# Patient Record
Sex: FEMALE | Race: WHITE | NOT HISPANIC OR LATINO | Employment: OTHER | URBAN - METROPOLITAN AREA
[De-identification: names, ages, dates, MRNs, and addresses within clinical notes are randomized per-mention and may not be internally consistent; named-entity substitution may affect disease eponyms.]

---

## 2017-01-11 ENCOUNTER — TRANSCRIBE ORDERS (OUTPATIENT)
Dept: ADMINISTRATIVE | Facility: HOSPITAL | Age: 62
End: 2017-01-11

## 2017-01-11 DIAGNOSIS — Z12.31 VISIT FOR SCREENING MAMMOGRAM: Primary | ICD-10-CM

## 2017-01-19 ENCOUNTER — HOSPITAL ENCOUNTER (OUTPATIENT)
Dept: MAMMOGRAPHY | Facility: HOSPITAL | Age: 62
Discharge: HOME | End: 2017-01-19
Attending: OBSTETRICS & GYNECOLOGY | Admitting: OBSTETRICS & GYNECOLOGY

## 2017-01-19 DIAGNOSIS — Z12.31 VISIT FOR SCREENING MAMMOGRAM: ICD-10-CM

## 2017-01-19 PROCEDURE — 77063 BREAST TOMOSYNTHESIS BI: CPT

## 2017-01-19 PROCEDURE — G0202 SCR MAMMO BI INCL CAD: HCPCS

## 2017-01-19 PROCEDURE — 77063 BREAST TOMOSYNTHESIS BI: CPT | Performed by: RADIOLOGY

## 2017-01-19 PROCEDURE — G0202 SCR MAMMO BI INCL CAD: HCPCS | Performed by: RADIOLOGY

## 2018-01-30 ENCOUNTER — TRANSCRIBE ORDERS (OUTPATIENT)
Dept: ADMINISTRATIVE | Facility: HOSPITAL | Age: 63
End: 2018-01-30

## 2018-01-30 DIAGNOSIS — Z12.31 VISIT FOR SCREENING MAMMOGRAM: Primary | ICD-10-CM

## 2018-02-15 ENCOUNTER — APPOINTMENT (OUTPATIENT)
Dept: MAMMOGRAPHY | Facility: HOSPITAL | Age: 63
End: 2018-02-15
Attending: OBSTETRICS & GYNECOLOGY

## 2018-02-16 ENCOUNTER — HOSPITAL ENCOUNTER (OUTPATIENT)
Dept: MAMMOGRAPHY | Facility: HOSPITAL | Age: 63
Discharge: HOME | End: 2018-02-16
Attending: OBSTETRICS & GYNECOLOGY | Admitting: OBSTETRICS & GYNECOLOGY

## 2018-02-16 DIAGNOSIS — Z12.31 VISIT FOR SCREENING MAMMOGRAM: ICD-10-CM

## 2018-02-16 PROCEDURE — 77063 BREAST TOMOSYNTHESIS BI: CPT

## 2018-02-16 PROCEDURE — 77063 BREAST TOMOSYNTHESIS BI: CPT | Performed by: RADIOLOGY

## 2018-02-16 PROCEDURE — 77067 SCR MAMMO BI INCL CAD: CPT | Performed by: RADIOLOGY

## 2018-02-16 PROCEDURE — 77067 SCR MAMMO BI INCL CAD: CPT

## 2018-03-02 ENCOUNTER — LAB REQUISITION (OUTPATIENT)
Dept: LAB | Facility: HOSPITAL | Age: 63
End: 2018-03-02

## 2018-03-02 DIAGNOSIS — Z12.11 ENCOUNTER FOR SCREENING FOR MALIGNANT NEOPLASM OF COLON: ICD-10-CM

## 2018-03-02 DIAGNOSIS — D12.0 BENIGN NEOPLASM OF CECUM: ICD-10-CM

## 2018-03-02 PROCEDURE — 88305 TISSUE EXAM BY PATHOLOGIST: CPT | Performed by: INTERNAL MEDICINE

## 2018-03-05 LAB
CYTO UR: NORMAL
LAB AP CASE REPORT: NORMAL
LAB AP CLINICAL INFORMATION: NORMAL
Lab: NORMAL
PATH REPORT.FINAL DX SPEC: NORMAL
PATH REPORT.GROSS SPEC: NORMAL

## 2019-02-20 ENCOUNTER — TRANSCRIBE ORDERS (OUTPATIENT)
Dept: ADMINISTRATIVE | Facility: HOSPITAL | Age: 64
End: 2019-02-20

## 2019-02-20 DIAGNOSIS — Z12.31 VISIT FOR SCREENING MAMMOGRAM: Primary | ICD-10-CM

## 2019-02-26 ENCOUNTER — HOSPITAL ENCOUNTER (OUTPATIENT)
Dept: MAMMOGRAPHY | Facility: HOSPITAL | Age: 64
Discharge: HOME | End: 2019-02-26
Admitting: FAMILY MEDICINE

## 2019-02-26 DIAGNOSIS — Z12.31 VISIT FOR SCREENING MAMMOGRAM: ICD-10-CM

## 2019-02-26 PROCEDURE — 77067 SCR MAMMO BI INCL CAD: CPT

## 2019-02-26 PROCEDURE — 77067 SCR MAMMO BI INCL CAD: CPT | Performed by: RADIOLOGY

## 2019-02-26 PROCEDURE — 77063 BREAST TOMOSYNTHESIS BI: CPT

## 2019-02-26 PROCEDURE — 77063 BREAST TOMOSYNTHESIS BI: CPT | Performed by: RADIOLOGY

## 2019-03-01 ENCOUNTER — HOSPITAL ENCOUNTER (OUTPATIENT)
Dept: MAMMOGRAPHY | Facility: HOSPITAL | Age: 64
Discharge: HOME | End: 2019-03-01
Admitting: RADIOLOGY

## 2019-03-01 DIAGNOSIS — R92.8 ABNORMAL MAMMOGRAM: ICD-10-CM

## 2019-03-01 PROCEDURE — 77065 DX MAMMO INCL CAD UNI: CPT | Performed by: RADIOLOGY

## 2019-03-01 PROCEDURE — 77065 DX MAMMO INCL CAD UNI: CPT

## 2019-03-01 PROCEDURE — G0279 TOMOSYNTHESIS, MAMMO: HCPCS | Performed by: RADIOLOGY

## 2019-03-01 PROCEDURE — G0279 TOMOSYNTHESIS, MAMMO: HCPCS

## 2020-07-01 ENCOUNTER — TRANSCRIBE ORDERS (OUTPATIENT)
Dept: ADMINISTRATIVE | Facility: HOSPITAL | Age: 65
End: 2020-07-01

## 2020-07-01 DIAGNOSIS — Z12.31 VISIT FOR SCREENING MAMMOGRAM: Primary | ICD-10-CM

## 2020-09-24 ENCOUNTER — HOSPITAL ENCOUNTER (OUTPATIENT)
Dept: MAMMOGRAPHY | Facility: HOSPITAL | Age: 65
Discharge: HOME | End: 2020-09-24
Admitting: FAMILY MEDICINE

## 2020-09-24 DIAGNOSIS — Z12.31 VISIT FOR SCREENING MAMMOGRAM: ICD-10-CM

## 2020-09-24 PROCEDURE — 77067 SCR MAMMO BI INCL CAD: CPT | Performed by: RADIOLOGY

## 2020-09-24 PROCEDURE — 77063 BREAST TOMOSYNTHESIS BI: CPT

## 2020-09-24 PROCEDURE — 77063 BREAST TOMOSYNTHESIS BI: CPT | Performed by: RADIOLOGY

## 2020-09-24 PROCEDURE — 77067 SCR MAMMO BI INCL CAD: CPT

## 2021-08-05 ENCOUNTER — TRANSCRIBE ORDERS (OUTPATIENT)
Dept: ADMINISTRATIVE | Facility: HOSPITAL | Age: 66
End: 2021-08-05

## 2021-08-05 DIAGNOSIS — M85.80 OTHER SPECIFIED DISORDERS OF BONE DENSITY AND STRUCTURE, UNSPECIFIED SITE: Primary | ICD-10-CM

## 2021-09-10 ENCOUNTER — APPOINTMENT (OUTPATIENT)
Dept: PREADMISSION TESTING | Facility: HOSPITAL | Age: 66
End: 2021-09-10

## 2021-11-15 ENCOUNTER — TRANSCRIBE ORDERS (OUTPATIENT)
Dept: ADMINISTRATIVE | Facility: HOSPITAL | Age: 66
End: 2021-11-15

## 2021-11-15 DIAGNOSIS — Z12.31 VISIT FOR SCREENING MAMMOGRAM: Primary | ICD-10-CM

## 2021-12-01 ENCOUNTER — TRANSCRIBE ORDERS (OUTPATIENT)
Dept: ADMINISTRATIVE | Facility: HOSPITAL | Age: 66
End: 2021-12-01

## 2021-12-01 DIAGNOSIS — N95.8 OTHER SPECIFIED MENOPAUSAL AND PERIMENOPAUSAL DISORDERS: Primary | ICD-10-CM

## 2021-12-27 PROBLEM — I48.92 PAROXYSMAL ATRIAL FLUTTER: Status: ACTIVE | Noted: 2021-12-27

## 2021-12-27 PROBLEM — Z99.89 OSA ON CPAP: Status: ACTIVE | Noted: 2021-12-27

## 2021-12-27 PROBLEM — E78.5 HYPERLIPIDEMIA LDL GOAL <100: Status: ACTIVE | Noted: 2021-12-27

## 2021-12-27 PROBLEM — E11.9 TYPE 2 DIABETES MELLITUS WITHOUT COMPLICATION, WITHOUT LONG-TERM CURRENT USE OF INSULIN: Status: ACTIVE | Noted: 2021-12-27

## 2021-12-27 PROBLEM — G47.33 OSA ON CPAP: Status: ACTIVE | Noted: 2021-12-27

## 2021-12-27 PROBLEM — I10 ESSENTIAL HYPERTENSION: Status: ACTIVE | Noted: 2021-12-27

## 2022-01-07 ENCOUNTER — APPOINTMENT (OUTPATIENT)
Dept: MAMMOGRAPHY | Facility: HOSPITAL | Age: 67
End: 2022-01-07

## 2022-02-08 ENCOUNTER — HOSPITAL ENCOUNTER (OUTPATIENT)
Dept: MAMMOGRAPHY | Facility: HOSPITAL | Age: 67
Discharge: HOME | End: 2022-02-08
Admitting: FAMILY MEDICINE

## 2022-02-08 DIAGNOSIS — Z12.31 VISIT FOR SCREENING MAMMOGRAM: ICD-10-CM

## 2022-02-08 PROCEDURE — 77063 BREAST TOMOSYNTHESIS BI: CPT

## 2022-02-08 PROCEDURE — 77067 SCR MAMMO BI INCL CAD: CPT | Performed by: RADIOLOGY

## 2022-02-08 PROCEDURE — 77067 SCR MAMMO BI INCL CAD: CPT

## 2022-02-08 PROCEDURE — 77063 BREAST TOMOSYNTHESIS BI: CPT | Performed by: RADIOLOGY

## 2022-03-03 ENCOUNTER — OFFICE VISIT (OUTPATIENT)
Dept: CARDIOLOGY | Facility: CLINIC | Age: 67
End: 2022-03-03

## 2022-03-03 VITALS
SYSTOLIC BLOOD PRESSURE: 110 MMHG | OXYGEN SATURATION: 95 % | HEIGHT: 60 IN | DIASTOLIC BLOOD PRESSURE: 84 MMHG | WEIGHT: 186 LBS | HEART RATE: 80 BPM | BODY MASS INDEX: 36.52 KG/M2

## 2022-03-03 DIAGNOSIS — G47.33 OSA ON CPAP: ICD-10-CM

## 2022-03-03 DIAGNOSIS — R94.31 ABNORMAL ELECTROCARDIOGRAM (ECG) (EKG): ICD-10-CM

## 2022-03-03 DIAGNOSIS — E78.5 HYPERLIPIDEMIA LDL GOAL <100: ICD-10-CM

## 2022-03-03 DIAGNOSIS — Z99.89 OSA ON CPAP: ICD-10-CM

## 2022-03-03 DIAGNOSIS — E11.9 TYPE 2 DIABETES MELLITUS WITHOUT COMPLICATION, WITHOUT LONG-TERM CURRENT USE OF INSULIN: ICD-10-CM

## 2022-03-03 DIAGNOSIS — I10 ESSENTIAL HYPERTENSION: ICD-10-CM

## 2022-03-03 DIAGNOSIS — R00.2 PALPITATIONS: Primary | ICD-10-CM

## 2022-03-03 PROCEDURE — 93000 ELECTROCARDIOGRAM COMPLETE: CPT | Performed by: INTERNAL MEDICINE

## 2022-03-03 PROCEDURE — 99204 OFFICE O/P NEW MOD 45 MIN: CPT | Performed by: INTERNAL MEDICINE

## 2022-03-03 RX ORDER — FUROSEMIDE 40 MG/1
40 TABLET ORAL DAILY
COMMUNITY

## 2022-03-03 RX ORDER — LEVOTHYROXINE SODIUM 88 UG/1
88 TABLET ORAL DAILY
COMMUNITY

## 2022-03-03 RX ORDER — METOPROLOL TARTRATE 50 MG/1
25 TABLET, FILM COATED ORAL 2 TIMES DAILY
COMMUNITY
End: 2022-08-22 | Stop reason: SDUPTHER

## 2022-03-03 RX ORDER — SPIRONOLACTONE 25 MG/1
25 TABLET ORAL DAILY
COMMUNITY

## 2022-03-03 RX ORDER — AZELASTINE 1 MG/ML
2 SPRAY, METERED NASAL 2 TIMES DAILY
COMMUNITY

## 2022-03-03 RX ORDER — MELOXICAM 15 MG/1
15 TABLET ORAL DAILY
COMMUNITY
Start: 2022-02-08

## 2022-03-03 RX ORDER — ERGOCALCIFEROL (VITAMIN D2) 10 MCG
TABLET ORAL DAILY
COMMUNITY
End: 2022-08-22

## 2022-03-03 RX ORDER — CYCLOBENZAPRINE HCL 5 MG
5 TABLET ORAL
COMMUNITY
Start: 2022-02-09 | End: 2022-08-22

## 2022-03-03 RX ORDER — ASCORBIC ACID 500 MG
500 TABLET ORAL
COMMUNITY
End: 2022-08-22

## 2022-03-03 RX ORDER — ALBUTEROL SULFATE 90 UG/1
2 AEROSOL, METERED RESPIRATORY (INHALATION) EVERY 4 HOURS PRN
COMMUNITY

## 2022-03-03 RX ORDER — ATORVASTATIN CALCIUM 20 MG/1
20 TABLET, FILM COATED ORAL DAILY
COMMUNITY
Start: 2022-02-08

## 2022-03-03 RX ORDER — EPINEPHRINE 0.3 MG/.3ML
INJECTION SUBCUTANEOUS ONCE
COMMUNITY

## 2022-03-10 ENCOUNTER — HOSPITAL ENCOUNTER (OUTPATIENT)
Dept: BONE DENSITY | Facility: HOSPITAL | Age: 67
Discharge: HOME | End: 2022-03-10
Admitting: FAMILY MEDICINE

## 2022-03-10 DIAGNOSIS — N95.8 OTHER SPECIFIED MENOPAUSAL AND PERIMENOPAUSAL DISORDERS: ICD-10-CM

## 2022-03-10 PROCEDURE — 77080 DXA BONE DENSITY AXIAL: CPT

## 2022-03-17 ENCOUNTER — HOSPITAL ENCOUNTER (OUTPATIENT)
Dept: CARDIOLOGY | Facility: HOSPITAL | Age: 67
Discharge: HOME | End: 2022-03-17
Admitting: INTERNAL MEDICINE

## 2022-03-17 VITALS — HEIGHT: 60 IN | WEIGHT: 186 LBS | BODY MASS INDEX: 36.52 KG/M2

## 2022-03-17 DIAGNOSIS — R00.2 PALPITATIONS: ICD-10-CM

## 2022-03-17 DIAGNOSIS — R94.31 ABNORMAL ELECTROCARDIOGRAM (ECG) (EKG): ICD-10-CM

## 2022-03-17 LAB
ASCENDING AORTA: 3.5 CM
BH CV ECHO MEAS - AO MAX PG (FULL): 2.3 MMHG
BH CV ECHO MEAS - AO MAX PG: 3.9 MMHG
BH CV ECHO MEAS - AO MEAN PG (FULL): 1.3 MMHG
BH CV ECHO MEAS - AO MEAN PG: 2.2 MMHG
BH CV ECHO MEAS - AO ROOT AREA (BSA CORRECTED): 1.6
BH CV ECHO MEAS - AO ROOT AREA: 6.8 CM^2
BH CV ECHO MEAS - AO ROOT DIAM: 2.9 CM
BH CV ECHO MEAS - AO V2 MAX: 99.1 CM/SEC
BH CV ECHO MEAS - AO V2 MEAN: 70.7 CM/SEC
BH CV ECHO MEAS - AO V2 VTI: 19.9 CM
BH CV ECHO MEAS - AVA(I,A): 2.2 CM^2
BH CV ECHO MEAS - AVA(I,D): 2.2 CM^2
BH CV ECHO MEAS - AVA(V,A): 2 CM^2
BH CV ECHO MEAS - AVA(V,D): 2 CM^2
BH CV ECHO MEAS - BSA(HAYCOCK): 1.9 M^2
BH CV ECHO MEAS - BSA: 1.8 M^2
BH CV ECHO MEAS - BZI_BMI: 36.3 KILOGRAMS/M^2
BH CV ECHO MEAS - BZI_METRIC_HEIGHT: 152.4 CM
BH CV ECHO MEAS - BZI_METRIC_WEIGHT: 84.4 KG
BH CV ECHO MEAS - EDV(CUBED): 53.5 ML
BH CV ECHO MEAS - EDV(MOD-SP2): 78 ML
BH CV ECHO MEAS - EDV(MOD-SP4): 87 ML
BH CV ECHO MEAS - EDV(TEICH): 60.7 ML
BH CV ECHO MEAS - EF(CUBED): 66.1 %
BH CV ECHO MEAS - EF(MOD-BP): 59 %
BH CV ECHO MEAS - EF(MOD-SP2): 65.4 %
BH CV ECHO MEAS - EF(MOD-SP4): 49.4 %
BH CV ECHO MEAS - EF(TEICH): 58.4 %
BH CV ECHO MEAS - ESV(CUBED): 18.1 ML
BH CV ECHO MEAS - ESV(MOD-SP2): 27 ML
BH CV ECHO MEAS - ESV(MOD-SP4): 44 ML
BH CV ECHO MEAS - ESV(TEICH): 25.2 ML
BH CV ECHO MEAS - FS: 30.3 %
BH CV ECHO MEAS - IVS/LVPW: 0.96
BH CV ECHO MEAS - IVSD: 1.1 CM
BH CV ECHO MEAS - LA DIMENSION: 2.9 CM
BH CV ECHO MEAS - LA/AO: 0.98
BH CV ECHO MEAS - LAD MAJOR: 4.7 CM
BH CV ECHO MEAS - LAT PEAK E' VEL: 3.8 CM/SEC
BH CV ECHO MEAS - LATERAL E/E' RATIO: 15.6
BH CV ECHO MEAS - LV DIASTOLIC VOL/BSA (35-75): 48.1 ML/M^2
BH CV ECHO MEAS - LV MASS(C)D: 130.5 GRAMS
BH CV ECHO MEAS - LV MASS(C)DI: 72.1 GRAMS/M^2
BH CV ECHO MEAS - LV MAX PG: 1.6 MMHG
BH CV ECHO MEAS - LV MEAN PG: 0.92 MMHG
BH CV ECHO MEAS - LV SYSTOLIC VOL/BSA (12-30): 24.3 ML/M^2
BH CV ECHO MEAS - LV V1 MAX: 63.2 CM/SEC
BH CV ECHO MEAS - LV V1 MEAN: 44 CM/SEC
BH CV ECHO MEAS - LV V1 VTI: 13.5 CM
BH CV ECHO MEAS - LVIDD: 3.8 CM
BH CV ECHO MEAS - LVIDS: 2.6 CM
BH CV ECHO MEAS - LVLD AP2: 6.9 CM
BH CV ECHO MEAS - LVLD AP4: 7.6 CM
BH CV ECHO MEAS - LVLS AP2: 6.3 CM
BH CV ECHO MEAS - LVLS AP4: 6.4 CM
BH CV ECHO MEAS - LVOT AREA (M): 3.1 CM^2
BH CV ECHO MEAS - LVOT AREA: 3.2 CM^2
BH CV ECHO MEAS - LVOT DIAM: 2 CM
BH CV ECHO MEAS - LVPWD: 1.1 CM
BH CV ECHO MEAS - MED PEAK E' VEL: 3.8 CM/SEC
BH CV ECHO MEAS - MEDIAL E/E' RATIO: 15.6
BH CV ECHO MEAS - MV A MAX VEL: 93.8 CM/SEC
BH CV ECHO MEAS - MV DEC SLOPE: 231.3 CM/SEC^2
BH CV ECHO MEAS - MV DEC TIME: 0.22 SEC
BH CV ECHO MEAS - MV E MAX VEL: 62.2 CM/SEC
BH CV ECHO MEAS - MV E/A: 0.66
BH CV ECHO MEAS - MV MAX PG: 3.6 MMHG
BH CV ECHO MEAS - MV MEAN PG: 1.5 MMHG
BH CV ECHO MEAS - MV P1/2T MAX VEL: 88.2 CM/SEC
BH CV ECHO MEAS - MV P1/2T: 111.7 MSEC
BH CV ECHO MEAS - MV V2 MAX: 95 CM/SEC
BH CV ECHO MEAS - MV V2 MEAN: 56.4 CM/SEC
BH CV ECHO MEAS - MV V2 VTI: 24.6 CM
BH CV ECHO MEAS - MVA P1/2T LCG: 2.5 CM^2
BH CV ECHO MEAS - MVA(P1/2T): 2 CM^2
BH CV ECHO MEAS - MVA(VTI): 1.7 CM^2
BH CV ECHO MEAS - PA ACC SLOPE: 377.4 CM/SEC^2
BH CV ECHO MEAS - PA ACC TIME: 0.14 SEC
BH CV ECHO MEAS - PA PR(ACCEL): 17.2 MMHG
BH CV ECHO MEAS - RAP SYSTOLE: 3 MMHG
BH CV ECHO MEAS - RVSP: 20 MMHG
BH CV ECHO MEAS - SI(AO): 74.7 ML/M^2
BH CV ECHO MEAS - SI(CUBED): 19.5 ML/M^2
BH CV ECHO MEAS - SI(LVOT): 23.7 ML/M^2
BH CV ECHO MEAS - SI(MOD-SP2): 28.2 ML/M^2
BH CV ECHO MEAS - SI(MOD-SP4): 23.8 ML/M^2
BH CV ECHO MEAS - SI(TEICH): 19.6 ML/M^2
BH CV ECHO MEAS - SV(AO): 135.3 ML
BH CV ECHO MEAS - SV(CUBED): 35.3 ML
BH CV ECHO MEAS - SV(LVOT): 43 ML
BH CV ECHO MEAS - SV(MOD-SP2): 51 ML
BH CV ECHO MEAS - SV(MOD-SP4): 43 ML
BH CV ECHO MEAS - SV(TEICH): 35.4 ML
BH CV ECHO MEAS - TAPSE (>1.6): 1.5 CM
BH CV ECHO MEAS - TR MAX PG: 17 MMHG
BH CV ECHO MEAS - TR MAX VEL: 204.5 CM/SEC
BH CV ECHO MEASUREMENTS AVERAGE E/E' RATIO: 16.37
BH CV VAS BP RIGHT ARM: NORMAL MMHG
BH CV XLRA - RV BASE: 3.3 CM
BH CV XLRA - RV LENGTH: 5.6 CM
BH CV XLRA - RV MID: 2.4 CM
BH CV XLRA - TDI S': 8.1 CM/SEC
LEFT ATRIUM VOLUME INDEX: 18.8 ML/M^2
LEFT ATRIUM VOLUME: 34 ML
LV EF 2D ECHO EST: 56 %

## 2022-03-17 PROCEDURE — 93306 TTE W/DOPPLER COMPLETE: CPT

## 2022-03-17 PROCEDURE — 93306 TTE W/DOPPLER COMPLETE: CPT | Performed by: INTERNAL MEDICINE

## 2022-04-26 ENCOUNTER — HOSPITAL ENCOUNTER (OUTPATIENT)
Dept: GENERAL RADIOLOGY | Facility: HOSPITAL | Age: 67
Discharge: HOME | End: 2022-04-26
Admitting: NURSE PRACTITIONER

## 2022-04-26 ENCOUNTER — TRANSCRIBE ORDERS (OUTPATIENT)
Dept: ADMINISTRATIVE | Facility: HOSPITAL | Age: 67
End: 2022-04-26

## 2022-04-26 DIAGNOSIS — M79.671 BILATERAL FOOT PAIN: Primary | ICD-10-CM

## 2022-04-26 DIAGNOSIS — M79.672 BILATERAL FOOT PAIN: Primary | ICD-10-CM

## 2022-04-26 PROCEDURE — 73630 X-RAY EXAM OF FOOT: CPT

## 2022-06-09 ENCOUNTER — TRANSCRIBE ORDERS (OUTPATIENT)
Dept: ADMINISTRATIVE | Facility: HOSPITAL | Age: 67
End: 2022-06-09

## 2022-06-09 DIAGNOSIS — M19.012 ARTHROSIS OF SHOULDER, LEFT: Primary | ICD-10-CM

## 2022-06-29 ENCOUNTER — TRANSCRIBE ORDERS (OUTPATIENT)
Dept: ADMINISTRATIVE | Facility: HOSPITAL | Age: 67
End: 2022-06-29

## 2022-06-29 ENCOUNTER — HOSPITAL ENCOUNTER (OUTPATIENT)
Dept: GENERAL RADIOLOGY | Facility: HOSPITAL | Age: 67
Discharge: HOME | End: 2022-06-29
Admitting: FAMILY MEDICINE

## 2022-06-29 DIAGNOSIS — S83.92XA SPRAIN OF LEFT KNEE, INITIAL ENCOUNTER: Primary | ICD-10-CM

## 2022-06-29 PROCEDURE — 73562 X-RAY EXAM OF KNEE 3: CPT

## 2022-06-29 PROCEDURE — 73590 X-RAY EXAM OF LOWER LEG: CPT

## 2022-07-06 ENCOUNTER — PRE-ADMISSION TESTING (OUTPATIENT)
Dept: PREADMISSION TESTING | Facility: HOSPITAL | Age: 67
End: 2022-07-06

## 2022-07-06 ENCOUNTER — HOSPITAL ENCOUNTER (OUTPATIENT)
Dept: CT IMAGING | Facility: HOSPITAL | Age: 67
Discharge: HOME | End: 2022-07-06

## 2022-07-06 ENCOUNTER — HOSPITAL ENCOUNTER (OUTPATIENT)
Dept: GENERAL RADIOLOGY | Facility: HOSPITAL | Age: 67
Discharge: HOME | End: 2022-07-06

## 2022-07-06 VITALS — BODY MASS INDEX: 34.9 KG/M2 | HEIGHT: 61 IN | WEIGHT: 184.86 LBS

## 2022-07-06 DIAGNOSIS — M19.012 ARTHROSIS OF SHOULDER, LEFT: ICD-10-CM

## 2022-07-06 LAB
ANION GAP SERPL CALCULATED.3IONS-SCNC: 9 MMOL/L (ref 5–15)
BUN SERPL-MCNC: 14 MG/DL (ref 8–23)
BUN/CREAT SERPL: 17.9 (ref 7–25)
CALCIUM SPEC-SCNC: 9.2 MG/DL (ref 8.6–10.5)
CHLORIDE SERPL-SCNC: 103 MMOL/L (ref 98–107)
CO2 SERPL-SCNC: 27 MMOL/L (ref 22–29)
CREAT SERPL-MCNC: 0.78 MG/DL (ref 0.57–1)
DEPRECATED RDW RBC AUTO: 39.8 FL (ref 37–54)
EGFRCR SERPLBLD CKD-EPI 2021: 82.9 ML/MIN/1.73
ERYTHROCYTE [DISTWIDTH] IN BLOOD BY AUTOMATED COUNT: 12 % (ref 12.3–15.4)
GLUCOSE SERPL-MCNC: 113 MG/DL (ref 65–99)
HBA1C MFR BLD: 6.1 % (ref 4.8–5.6)
HCT VFR BLD AUTO: 41.5 % (ref 34–46.6)
HGB BLD-MCNC: 14 G/DL (ref 12–15.9)
MCH RBC QN AUTO: 30.6 PG (ref 26.6–33)
MCHC RBC AUTO-ENTMCNC: 33.7 G/DL (ref 31.5–35.7)
MCV RBC AUTO: 90.8 FL (ref 79–97)
PLATELET # BLD AUTO: 260 10*3/MM3 (ref 140–450)
PMV BLD AUTO: 9.9 FL (ref 6–12)
POTASSIUM SERPL-SCNC: 3.9 MMOL/L (ref 3.5–5.2)
RBC # BLD AUTO: 4.57 10*6/MM3 (ref 3.77–5.28)
SODIUM SERPL-SCNC: 139 MMOL/L (ref 136–145)
WBC NRBC COR # BLD: 5.74 10*3/MM3 (ref 3.4–10.8)

## 2022-07-06 PROCEDURE — 85027 COMPLETE CBC AUTOMATED: CPT | Performed by: ORTHOPAEDIC SURGERY

## 2022-07-06 PROCEDURE — 83036 HEMOGLOBIN GLYCOSYLATED A1C: CPT | Performed by: ORTHOPAEDIC SURGERY

## 2022-07-06 PROCEDURE — 71046 X-RAY EXAM CHEST 2 VIEWS: CPT

## 2022-07-06 PROCEDURE — 73200 CT UPPER EXTREMITY W/O DYE: CPT

## 2022-07-06 PROCEDURE — 80048 BASIC METABOLIC PNL TOTAL CA: CPT | Performed by: ORTHOPAEDIC SURGERY

## 2022-07-06 PROCEDURE — 36415 COLL VENOUS BLD VENIPUNCTURE: CPT

## 2022-07-06 RX ORDER — ERGOCALCIFEROL 1.25 MG/1
50000 CAPSULE ORAL
COMMUNITY

## 2022-07-06 NOTE — PAT
"Patient viewed general PAT education video as instructed in their preoperative information received from their surgeon.  Patient stated the general PAT education video was viewed in its entirety and survey completed.  Copies of [facility] general education handouts (Incentive Spirometry, Meds to Beds Program, Patient Belongings, Pre-op skin preparation instructions, Blood Glucose testing, Visitor policy, Surgery FAQ, Code H) distributed to patient if not printed. Education related to the PAT pass and skin preparation for surgery (if applicable) completed in PAT as a reinforcement to PAT education video. Patient instructed to return PAT pass provided today as well as completed skin preparation sheet (if applicable) on the day of procedure.     Additionally if patient had not viewed video yet but intended to view it at home or in our waiting area, then referred them to the handout with QR code/link provided during PAT visit.  Instructed patient to complete survey after viewing the video in its entirety.  Encouraged patient/family to read [facility] general education handouts thoroughly and notify PAT staff with any questions or concerns. Patient verbalized understanding of all information and priority content.    Patient to apply Chlorhexadine wipes  to surgical area (as instructed) the night before procedure and the AM of procedure. Wipes provided.    Patient instructed to drink 20 ounces (or until full) of Gatorade and it needs to be completed 1 hour (for Main OR patients) or 2 hours (scheduled  section patients) before given arrival time for procedure (NO RED Gatorade)    Patient verbalized understanding.    Per Anesthesia Request, patient instructed not to take their ACE/ARB medications on the AM of surgery.    Notified surgery scheduler, Edna, that pt reports falling 2 weeks ago and injuring her right knee.  Pt states, \"the doctor thinks it may be a meniscus tear\".  She has a brace on her right knee and ankle, using " a walking stick for stability, limping when walking.    Pt also reports she is slow to heal after surgeries, had a large Seroma in abdomen following Abdominoplasty.  Above info given to dEna who will inform Dr. Jones.     EKG CLEARED BY DR. MELISSA.  PT SAW DR. HERRERA AND HAD ECHO 03/2022   (ON CHART).

## 2022-07-14 ENCOUNTER — TRANSCRIBE ORDERS (OUTPATIENT)
Dept: NUTRITION | Facility: HOSPITAL | Age: 67
End: 2022-07-14

## 2022-07-14 DIAGNOSIS — E66.01 SEVERE OBESITY: Primary | ICD-10-CM

## 2022-07-15 ENCOUNTER — APPOINTMENT (OUTPATIENT)
Dept: PREADMISSION TESTING | Facility: HOSPITAL | Age: 67
End: 2022-07-15

## 2022-08-22 ENCOUNTER — OFFICE VISIT (OUTPATIENT)
Dept: CARDIOLOGY | Facility: CLINIC | Age: 67
End: 2022-08-22

## 2022-08-22 VITALS
WEIGHT: 176.6 LBS | SYSTOLIC BLOOD PRESSURE: 112 MMHG | DIASTOLIC BLOOD PRESSURE: 64 MMHG | BODY MASS INDEX: 33.34 KG/M2 | HEART RATE: 73 BPM | OXYGEN SATURATION: 98 % | HEIGHT: 61 IN

## 2022-08-22 DIAGNOSIS — I10 ESSENTIAL HYPERTENSION: ICD-10-CM

## 2022-08-22 DIAGNOSIS — E78.5 HYPERLIPIDEMIA LDL GOAL <100: ICD-10-CM

## 2022-08-22 DIAGNOSIS — R00.2 PALPITATIONS: Primary | ICD-10-CM

## 2022-08-22 PROCEDURE — 99214 OFFICE O/P EST MOD 30 MIN: CPT | Performed by: INTERNAL MEDICINE

## 2022-08-22 RX ORDER — LORATADINE 10 MG/1
1 CAPSULE, LIQUID FILLED ORAL DAILY
COMMUNITY

## 2022-08-22 RX ORDER — BUPROPION HYDROCHLORIDE 300 MG/1
1 TABLET ORAL DAILY
COMMUNITY
Start: 2022-07-25

## 2022-08-22 RX ORDER — ALENDRONATE SODIUM 70 MG/1
1 TABLET ORAL WEEKLY
COMMUNITY
Start: 2022-08-18

## 2022-08-22 RX ORDER — MONTELUKAST SODIUM 10 MG/1
1 TABLET ORAL DAILY
COMMUNITY
Start: 2022-08-18

## 2022-08-22 RX ORDER — FLUTICASONE PROPIONATE 50 MCG
2 SPRAY, SUSPENSION (ML) NASAL DAILY
COMMUNITY

## 2022-08-22 RX ORDER — FLUTICASONE PROPIONATE AND SALMETEROL 100; 50 UG/1; UG/1
1 POWDER RESPIRATORY (INHALATION)
COMMUNITY

## 2022-08-22 RX ORDER — FLUOXETINE HYDROCHLORIDE 40 MG/1
40 CAPSULE ORAL DAILY
COMMUNITY

## 2022-08-22 RX ORDER — TRAZODONE HYDROCHLORIDE 50 MG/1
1 TABLET ORAL NIGHTLY
COMMUNITY
Start: 2022-05-18

## 2022-08-22 NOTE — PROGRESS NOTES
[facility]   [address], [address]  [place], [place], [id number] [phone]  [email or web address]           OUTPATIENT CLINIC FOLLOW UP NOTE    Patient Care Team:  Patient Care Team:  Elisa Bedolla MD as PCP - General (General Practice)  Bob Peralta MD as Consulting Physician (Cardiology)    Subjective:      Chief Complaint   Patient presents with   • Palpitations   • Paroxysmal atrial flutter        HPI:    Ro Bonds is a 66 y.o. female.  Cardiac focused, problem list:  1. Palpitations  a. Remote LHCx2; negative per patient-data deficit  b. Remote stress test and heart catheterization approximately 5-10 years ago ([facility]); negative per patient-data deficit  c. Remote event monitor approximately ; patient was told she had occasional fast heart rates-data deficit  d. CCS class I chest discomfort/NYHA class II dyspnea on exertion symptoms with acceptable electrocardiogram, 2022  e. Holter monitor 3/2022:  Primary rhythm was sinus rhythm with average heart rate 85 bpm. 0% Afib burden. PVS burden 0.02%.   2. Hypertension  3. Hyperlipidemia; on statin therapy  4. JOSEPH, noncompliant with CPAP  5. Moderate obesity: BMI 36.33  6. Neurocardiogenic syncope with remote positive tilt table test-data deficit  7. Asthma/emphysema  8. Type 2 diabetes mellitus; hemoglobin A1c 6.7% 2021  9. Vitamin D deficiency  10. Iron deficiency anemia  11. Hypothyroidism, on replacement therapy   12. Degenerative joint disease   13. Anxiety  14. Fatty liver  15. History of MRSA  16. History of PIH  17. Surgical history:   a. Tonsillectomy   b. Hysterectomy  c. Gastric bypass  d. Cholecystectomy  e.  x2    Patient presents today for follow up.     No significant palpitations recently.    Last month had some intermittent lightheadedness/dizziness with a variable blood pressure ranging from 60 - 160 mmHg sysotlic.  With that said, symptoms have resolved.  The last  couple weeks the patient has been active and without symptoms    Review of Systems:  As noted above in the HPI     PFSH:  Patient Active Problem List   Diagnosis   • Paroxysmal atrial flutter (HCC)   • Essential hypertension   • Hyperlipidemia LDL goal <100   • Type 2 diabetes mellitus without complication, without long-term current use of insulin (HCC)   • JOSEPH on CPAP         Current Outpatient Medications:   •  albuterol sulfate  (90 Base) MCG/ACT inhaler, albuterol sulfate HFA 90 mcg/actuation aerosol inhaler  Inhale 2 puffs every 4 hours by inhalation route as needed for 90 days., Disp: , Rfl:   •  alendronate (FOSAMAX) 70 MG tablet, Take 1 tablet by mouth 1 (One) Time Per Week., Disp: , Rfl:   •  atorvastatin (LIPITOR) 20 MG tablet, 20 mg Daily., Disp: , Rfl:   •  azelastine (ASTELIN) 0.1 % nasal spray, 2 sprays into the nostril(s) as directed by provider Every 12 (Twelve) Hours., Disp: , Rfl:   •  buPROPion XL (WELLBUTRIN XL) 300 MG 24 hr tablet, Take 1 tablet by mouth Daily., Disp: , Rfl:   •  EPINEPHrine (EPIPEN) 0.3 MG/0.3ML solution auto-injector injection, epinephrine 0.3 mg/0.3 mL injection, auto-injector, Disp: , Rfl:   •  FLUoxetine (PROzac) 40 MG capsule, Take 40 mg by mouth Daily., Disp: , Rfl:   •  fluticasone (FLONASE) 50 MCG/ACT nasal spray, 2 sprays into the nostril(s) as directed by provider Daily., Disp: , Rfl:   •  Fluticasone-Salmeterol (Wixela Inhub) 100-50 MCG/ACT DISKUS, Inhale 1 puff 2 (Two) Times a Day., Disp: , Rfl:   •  furosemide (LASIX) 40 MG tablet, Take 40 mg by mouth Daily., Disp: , Rfl:   •  levothyroxine (SYNTHROID, LEVOTHROID) 88 MCG tablet, Take 88 mcg by mouth Daily., Disp: , Rfl:   •  Loratadine 10 MG capsule, Take 1 capsule by mouth Daily., Disp: , Rfl:   •  meloxicam (MOBIC) 15 MG tablet, Take 15 mg by mouth Daily., Disp: , Rfl:   •  metoprolol tartrate (LOPRESSOR) 25 MG tablet, Take 1 tablet by mouth 2 (Two) Times a Day., Disp: 180 tablet, Rfl: 3  •  montelukast  "(SINGULAIR) 10 MG tablet, Take 1 tablet by mouth Daily., Disp: , Rfl:   •  sertraline (ZOLOFT) 50 MG tablet, Take 50 mg by mouth Daily., Disp: , Rfl:   •  spironolactone (ALDACTONE) 25 MG tablet, Take 25 mg by mouth Daily., Disp: , Rfl:   •  traZODone (DESYREL) 50 MG tablet, Take 1 tablet by mouth Daily., Disp: , Rfl:   •  vitamin D (ERGOCALCIFEROL) 1.25 MG (25020 UT) capsule capsule, Take 50,000 Units by mouth 1 (One) Time Per Week. SUNDAYS, Disp: , Rfl:      reports that she has never smoked. She has never used smokeless tobacco.      Objective:   Physical exam:  /64 (BP Location: Right arm, Patient Position: Sitting)   Pulse 73   Ht 154.9 cm (61\")   Wt 80.1 kg (176 lb 9.6 oz)   LMP 05/31/1995 (Exact Date) Comment: LMP ~ AGE 40  SpO2 98%   BMI 33.37 kg/m²   CONSTITUTIONAL: No acute distress  RESPIRATORY: Normal effort. Clear to auscultation bilaterally without wheezing or rales  CARDIOVASCULAR: Regular rate and rhythm with normal S1 and S2. Without murmur.  PERIPHERAL VASCULAR: Normal radial pulse. There is no significant lower extremity edema bilaterally.    Labs:    No results found for: LDL  No components found for: LDLDIRECTC    Diagnostic Data:    Procedures    Results for orders placed during the hospital encounter of 03/17/22    Adult Transthoracic Echo Complete w/ Color, Spectral and Contrast if necessary per protocol    Interpretation Summary  · Estimated left ventricular EF = 56% Estimated left ventricular EF was in agreement with the calculated left ventricular EF. Left ventricular ejection fraction appears to be 56 - 60%. Left ventricular systolic function is normal.  · Estimated right ventricular systolic pressure from tricuspid regurgitation is normal (<35 mmHg).  · Left ventricular diastolic function is consistent with (grade I) impaired relaxation.  · Normal right ventricular cavity size, wall thickness and septal motion noted with mildly reduced right ventricular systolic function.  · " No evidence of pulmonary hypertension is present.  · There is no evidence of pericardial effusion.  · No significant structural or functional valvular abnormalities demonstrated.    Holter monitor 3/24/2022  · Primary rhythm was sinus rhythm with average heart rate 85 bpm.   · 0% Afib burden.   · PVC burden 0.02%.      Assessment and Plan:     Palpitations  -Stable symptoms  -Changing Lopressor to 25 mg twice daily  -Not switching to Toprol due to prior gastric bypass  -Recommended increased cardiac exercise    Essential hypertension  -Continue current medications with the above change Lopressor    Hyperlipidemia LDL goal <100   -Continue statin    - Return in about 1 year (around 8/22/2023) for Next scheduled follow-up with an ECG.    Bob Peralta MD, MSc, FACC, [facility]  Interventional Cardiology  [facility]

## 2022-08-29 ENCOUNTER — HOSPITAL ENCOUNTER (OUTPATIENT)
Dept: NUTRITION | Facility: HOSPITAL | Age: 67
Setting detail: RECURRING SERIES
Discharge: HOME | End: 2022-08-29

## 2022-08-29 PROCEDURE — 97802 MEDICAL NUTRITION INDIV IN: CPT

## 2022-08-29 NOTE — CONSULTS
"Adult Outpatient Nutrition  Assessment    Patient Name:  Ro Bonds  YOB: 1955  MRN: [id number]    Assessment Date:  8/29/2022    Comments:      This medical referred consult was provided in office. Consent for treatment was given verbally. RD spent a total of 60 minutes with patient today.      Reason for visit:     Patient is a 67 year old female with a h/o of high blood sugar and gastric bypass surgery in 2009 who is referred today for healthy weight management. Patient shared she is seeking support to obtain sustainable weight loss and to form healthy lifestyle habits. Patient reported she has recently lost ~ 7 lbs (3.18 kg) in the past month by tracking her calories on My Fitness Pal and eating less than 1000 calories per day. Patient has several questions for RD today.    Session Details:     Attendees: Patient  Language/communication details: English is preferred language   Barriers to learning: None  Health literacy: Adequate   Details of home: Patient lives with her      Anthropometrics:     Ht Readings from Last 1 Encounters:   08/22/22 154.9 cm (61\")      Wt Readings from Last 1 Encounters:   08/22/22 80.1 kg (176 lb 9.6 oz)      BMI Readings from Last 1 Encounters:   08/22/22 33.37 kg/m²        Pertinent Labs:     Lab Results   Component Value Date    GLUCOSE 113 (H) 07/06/2022    BUN 14 07/06/2022    CREATININE 0.78 07/06/2022    BCR 17.9 07/06/2022    K 3.9 07/06/2022    CO2 27.0 07/06/2022    CALCIUM 9.2 07/06/2022     Lab Results   Component Value Date    HGBA1C 6.10 (H) 07/06/2022       Pertinent Medications/Supplements:    Current Outpatient Medications   Medication Instructions   • albuterol sulfate  (90 Base) MCG/ACT inhaler albuterol sulfate HFA 90 mcg/actuation aerosol inhaler   Inhale 2 puffs every 4 hours by inhalation route as needed for 90 days.   • alendronate (FOSAMAX) 70 MG tablet 1 tablet, Oral, Weekly   • atorvastatin (LIPITOR) 20 mg, Daily   • " azelastine (ASTELIN) 0.1 % nasal spray 2 sprays, Nasal, Every 12 Hours Scheduled   • buPROPion XL (WELLBUTRIN XL) 300 MG 24 hr tablet 1 tablet, Oral, Daily   • EPINEPHrine (EPIPEN) 0.3 MG/0.3ML solution auto-injector injection epinephrine 0.3 mg/0.3 mL injection, auto-injector   • FLUoxetine (PROZAC) 40 mg, Oral, Daily   • fluticasone (FLONASE) 50 MCG/ACT nasal spray 2 sprays, Nasal, Daily   • Fluticasone-Salmeterol (Wixela Inhub) 100-50 MCG/ACT DISKUS 1 puff, Inhalation, 2 Times Daily - RT   • furosemide (LASIX) 40 mg, Oral, Daily   • levothyroxine (SYNTHROID, LEVOTHROID) 88 mcg, Oral, Daily   • Loratadine 10 MG capsule 1 capsule, Oral, Daily   • meloxicam (MOBIC) 15 mg, Oral, Daily   • metoprolol tartrate (LOPRESSOR) 25 mg, Oral, 2 Times Daily   • montelukast (SINGULAIR) 10 MG tablet 1 tablet, Oral, Daily   • sertraline (ZOLOFT) 50 mg, Oral, Daily   • spironolactone (ALDACTONE) 25 mg, Oral, Daily   • traZODone (DESYREL) 50 MG tablet 1 tablet, Oral, Daily   • vitamin D (ERGOCALCIFEROL) 50,000 Units, Oral, Weekly, SUNDAYS       Nutrition Assessment:     Food assistance programs: None  Who prepares most meals: Patient or spouse  Who does grocery shopping: Patient or spouse    Frequency of eating out: once per week  Food allergies/intolerances/aversions: None  Difficulty chewing: None  Difficulty swallowing: None  Gastrointestinal symptoms that impact intake or food choices: constipation or diarrhea, bloating, and/or nausea 1-2 times per week  Diet requirements related to personal preference or cultural belief: none  Support for health: PCP    Diet recall:   Time Food/beverages consumed   9:00 am Handful of grapes   12:00 pm 2 bites macaroni and cheese, 1 bite grilled cheese sandwich   4:00 pm 2 oreo cookies   7:00 pm 1 cup buttered noodles, 1/4 cup canned sirloin burger soup    Beverages 32 fl oz water, one sip regular soda      Nutrition assessment comments: Patient stated the dietary recall above is an accurate  representation of a typical day of eating for her.     Physical activity:    Patient stated she does not currently engage in consistent exercise. [place] land and her animals. She is interested in discussing exercise today.    Assessed Needs:     Estimated energy needs: 1250 calories per day (Jeramy Fisher, activity factor 1.2, subtracting 250 calories per day to promote safe weight loss)    Estimated protein needs: 80 grams per day (1.0 grams of protein per kg)    PES Statement:     Obesity related to diet and lifestyle as evidenced by BMI 33.37.      Discussion:     We reviewed research showing 95% of individuals that go on a diet end up regaining the weight they may have lost in 5 years or less. RD emphasized the importance of finding sustainable approaches to behavior change. Per dietary recall and interview, patient is not meeting her overall nutrient needs. We discussed how both over eating and under eating can make weight loss difficult and present risks to our health. RD discussed the importance of eating consistent, balanced meals to promote overall health and weight loss. We reviewed the different food groups and identified what foods contain protein, carbohydrates, and fat. RD used the plate method to demonstrate the components of a balanced meal. RD provided patient with several meal and snack ideas. Patient state she will need to eat smaller, more frequent meals to meet her nutrient needs since she cannot eat a lot at one time. She was able to teach back concepts discussed by suggesting for these small meals she could have a nutrition shake, and apple with peanut butter, a salad with meat or eggs, or a greek yogurt cup. Patient set her own goal and stated she feels confident she can accomplish it. RD encouraged patient to contact RD with any needs prior to next scheduled visit.     Goals:    1) Eat 400 calories per meal, three times per day, with a source of  protein, fiber containing carbohydrates, and fat.    Resources Provided:     [employer] Healthy Meal and Snack Ideas  Mement[employer] 3 Macro Nutrient Handout  [employer] Smoothie Recipes       Total of 60 minutes spent with patient on nutrition counseling. Education based on Academy of Nutrition and Dietetics guidelines. Patient was provided with RD's contact information. Follow up visit is scheduled for 10/11 at 11:45 am. Thank you for this referral.      Electronically signed by:  Jeanna Lagos RD  08/29/22 14:49 EDT

## 2022-09-20 ENCOUNTER — TRANSCRIBE ORDERS (OUTPATIENT)
Dept: ADMINISTRATIVE | Facility: HOSPITAL | Age: 67
End: 2022-09-20

## 2022-09-20 DIAGNOSIS — M54.50 LOW BACK PAIN WITH RADIATION: Primary | ICD-10-CM

## 2022-10-10 ENCOUNTER — APPOINTMENT (OUTPATIENT)
Dept: PREADMISSION TESTING | Facility: HOSPITAL | Age: 67
End: 2022-10-10

## 2022-10-11 ENCOUNTER — APPOINTMENT (OUTPATIENT)
Dept: NUTRITION | Facility: HOSPITAL | Age: 67
End: 2022-10-11

## 2022-10-18 ENCOUNTER — TRANSCRIBE ORDERS (OUTPATIENT)
Dept: ADMINISTRATIVE | Facility: HOSPITAL | Age: 67
End: 2022-10-18

## 2022-10-18 ENCOUNTER — HOSPITAL ENCOUNTER (OUTPATIENT)
Dept: GENERAL RADIOLOGY | Facility: HOSPITAL | Age: 67
Discharge: HOME | End: 2022-10-18
Admitting: NURSE PRACTITIONER

## 2022-10-18 DIAGNOSIS — U07.1 COVID-19: Primary | ICD-10-CM

## 2022-10-18 PROCEDURE — 71046 X-RAY EXAM CHEST 2 VIEWS: CPT

## 2022-10-20 ENCOUNTER — APPOINTMENT (OUTPATIENT)
Dept: MRI IMAGING | Facility: HOSPITAL | Age: 67
End: 2022-10-20

## 2022-10-21 ENCOUNTER — PRE-ADMISSION TESTING (OUTPATIENT)
Dept: PREADMISSION TESTING | Facility: HOSPITAL | Age: 67
End: 2022-10-21

## 2022-10-21 VITALS — WEIGHT: 177.91 LBS | HEIGHT: 61 IN | BODY MASS INDEX: 33.59 KG/M2

## 2022-10-21 LAB
ANION GAP SERPL CALCULATED.3IONS-SCNC: 11 MMOL/L (ref 5–15)
BUN SERPL-MCNC: 18 MG/DL (ref 8–23)
BUN/CREAT SERPL: 18.6 (ref 7–25)
CALCIUM SPEC-SCNC: 9.2 MG/DL (ref 8.6–10.5)
CHLORIDE SERPL-SCNC: 104 MMOL/L (ref 98–107)
CO2 SERPL-SCNC: 29 MMOL/L (ref 22–29)
CREAT SERPL-MCNC: 0.97 MG/DL (ref 0.57–1)
DEPRECATED RDW RBC AUTO: 42.6 FL (ref 37–54)
EGFRCR SERPLBLD CKD-EPI 2021: 63.4 ML/MIN/1.73
ERYTHROCYTE [DISTWIDTH] IN BLOOD BY AUTOMATED COUNT: 12.9 % (ref 12.3–15.4)
GLUCOSE SERPL-MCNC: 143 MG/DL (ref 65–99)
HBA1C MFR BLD: 6.7 % (ref 4.8–5.6)
HCT VFR BLD AUTO: 44.5 % (ref 34–46.6)
HGB BLD-MCNC: 14.6 G/DL (ref 12–15.9)
MCH RBC QN AUTO: 29.7 PG (ref 26.6–33)
MCHC RBC AUTO-ENTMCNC: 32.8 G/DL (ref 31.5–35.7)
MCV RBC AUTO: 90.6 FL (ref 79–97)
PLATELET # BLD AUTO: 352 10*3/MM3 (ref 140–450)
PMV BLD AUTO: 9.8 FL (ref 6–12)
POTASSIUM SERPL-SCNC: 4.5 MMOL/L (ref 3.5–5.2)
RBC # BLD AUTO: 4.91 10*6/MM3 (ref 3.77–5.28)
SODIUM SERPL-SCNC: 144 MMOL/L (ref 136–145)
WBC NRBC COR # BLD: 9.26 10*3/MM3 (ref 3.4–10.8)

## 2022-10-21 PROCEDURE — 80048 BASIC METABOLIC PNL TOTAL CA: CPT

## 2022-10-21 PROCEDURE — 36415 COLL VENOUS BLD VENIPUNCTURE: CPT

## 2022-10-21 PROCEDURE — 83036 HEMOGLOBIN GLYCOSYLATED A1C: CPT

## 2022-10-21 PROCEDURE — 85027 COMPLETE CBC AUTOMATED: CPT

## 2022-10-21 RX ORDER — DEXTROMETHORPHAN HYDROBROMIDE AND PROMETHAZINE HYDROCHLORIDE 15; 6.25 MG/5ML; MG/5ML
SYRUP ORAL NIGHTLY PRN
Status: ON HOLD | COMMUNITY
Start: 2022-10-17 | End: 2022-10-31

## 2022-10-21 RX ORDER — PREDNISONE 10 MG/1
10 TABLET ORAL DAILY
Status: ON HOLD | COMMUNITY
Start: 2022-10-17 | End: 2022-10-31

## 2022-10-21 RX ORDER — CIPROFLOXACIN 500 MG/1
500 TABLET, FILM COATED ORAL 2 TIMES DAILY
Status: ON HOLD | COMMUNITY
Start: 2022-10-17 | End: 2022-10-31

## 2022-10-21 NOTE — PAT
Patient instructed to drink 20 ounces of Gatorade and it needs to be completed 1 hour (for Main OR patients) or 2 hours (scheduled  section & [facility] patients) before given arrival time for procedure (NO RED Gatorade)    Patient verbalized understanding.    Patient to apply Chlorhexadine wipes  to surgical area (as instructed) the night before procedure and the AM of procedure. Wipes provided.    CXR in chart from 10/18/22.    EKG in chart from 22.  Patient denies and chest pain or SOA since last being seen by cardiologist.    Consent signed in chart.    Patient is currently taking cipro and prednisone for recent COVID infection.  I called Dr. Jones's office to notify them and spoke to Carolina, who took a emssgae for Shonna.

## 2022-10-30 ENCOUNTER — ANESTHESIA EVENT (OUTPATIENT)
Dept: PERIOP | Facility: HOSPITAL | Age: 67
End: 2022-10-30

## 2022-10-30 RX ORDER — SODIUM CHLORIDE 0.9 % (FLUSH) 0.9 %
10 SYRINGE (ML) INJECTION EVERY 12 HOURS SCHEDULED
Status: CANCELLED | OUTPATIENT
Start: 2022-10-30

## 2022-10-30 RX ORDER — SODIUM CHLORIDE 0.9 % (FLUSH) 0.9 %
10 SYRINGE (ML) INJECTION AS NEEDED
Status: CANCELLED | OUTPATIENT
Start: 2022-10-30

## 2022-10-30 RX ORDER — FAMOTIDINE 10 MG/ML
20 INJECTION, SOLUTION INTRAVENOUS ONCE
Status: CANCELLED | OUTPATIENT
Start: 2022-10-30 | End: 2022-10-30

## 2022-10-31 ENCOUNTER — ANESTHESIA (OUTPATIENT)
Dept: PERIOP | Facility: HOSPITAL | Age: 67
End: 2022-10-31

## 2022-10-31 ENCOUNTER — HOSPITAL ENCOUNTER (OUTPATIENT)
Facility: HOSPITAL | Age: 67
Discharge: HOME | End: 2022-11-01
Attending: ORTHOPAEDIC SURGERY | Admitting: ORTHOPAEDIC SURGERY

## 2022-10-31 ENCOUNTER — ANESTHESIA EVENT CONVERTED (OUTPATIENT)
Dept: ANESTHESIOLOGY | Facility: HOSPITAL | Age: 67
End: 2022-10-31

## 2022-10-31 ENCOUNTER — APPOINTMENT (OUTPATIENT)
Dept: GENERAL RADIOLOGY | Facility: HOSPITAL | Age: 67
End: 2022-10-31

## 2022-10-31 PROBLEM — Z96.612 HISTORY OF TOTAL REPLACEMENT OF LEFT SHOULDER JOINT: Status: ACTIVE | Noted: 2022-10-31

## 2022-10-31 PROBLEM — E66.9 OBESITY: Status: ACTIVE | Noted: 2022-10-31

## 2022-10-31 LAB
GLUCOSE BLDC GLUCOMTR-MCNC: 108 MG/DL (ref 70–130)
GLUCOSE BLDC GLUCOMTR-MCNC: 120 MG/DL (ref 70–130)
GLUCOSE BLDC GLUCOMTR-MCNC: 206 MG/DL (ref 70–130)
GLUCOSE BLDC GLUCOMTR-MCNC: 209 MG/DL (ref 70–130)
POTASSIUM SERPL-SCNC: 3.9 MMOL/L (ref 3.5–5.2)
POTASSIUM SERPL-SCNC: 4.8 MMOL/L (ref 3.5–5.2)

## 2022-10-31 PROCEDURE — 63710000001 INSULIN DETEMIR PER 5 UNITS: Performed by: INTERNAL MEDICINE

## 2022-10-31 PROCEDURE — 63710000001 INSULIN LISPRO (HUMAN) PER 5 UNITS: Performed by: INTERNAL MEDICINE

## 2022-10-31 PROCEDURE — 25010000002 FENTANYL CITRATE (PF) 50 MCG/ML SOLUTION

## 2022-10-31 PROCEDURE — 63710000001 POVIDONE-IODINE 10 % SOLUTION 30 ML BOTTLE: Performed by: ORTHOPAEDIC SURGERY

## 2022-10-31 PROCEDURE — A9270 NON-COVERED ITEM OR SERVICE: HCPCS | Performed by: INTERNAL MEDICINE

## 2022-10-31 PROCEDURE — C1889 IMPLANT/INSERT DEVICE, NOC: HCPCS | Performed by: ORTHOPAEDIC SURGERY

## 2022-10-31 PROCEDURE — 25010000002 HYDROMORPHONE PER 4 MG: Performed by: ORTHOPAEDIC SURGERY

## 2022-10-31 PROCEDURE — 73030 X-RAY EXAM OF SHOULDER: CPT

## 2022-10-31 PROCEDURE — 25010000002 PROPOFOL 10 MG/ML EMULSION: Performed by: NURSE ANESTHETIST, CERTIFIED REGISTERED

## 2022-10-31 PROCEDURE — 63710000001 OXYCODONE 5 MG TABLET: Performed by: ORTHOPAEDIC SURGERY

## 2022-10-31 PROCEDURE — 63710000001 BUDESONIDE-FORMOTEROL 80-4.5 MCG/ACT AEROSOL 6.9 G INHALER: Performed by: INTERNAL MEDICINE

## 2022-10-31 PROCEDURE — 97165 OT EVAL LOW COMPLEX 30 MIN: CPT | Performed by: OCCUPATIONAL THERAPIST

## 2022-10-31 PROCEDURE — 25010000002 ROPIVACAINE PER 1 MG: Performed by: NURSE ANESTHETIST, CERTIFIED REGISTERED

## 2022-10-31 PROCEDURE — C1776 JOINT DEVICE (IMPLANTABLE): HCPCS | Performed by: ORTHOPAEDIC SURGERY

## 2022-10-31 PROCEDURE — 97110 THERAPEUTIC EXERCISES: CPT | Performed by: OCCUPATIONAL THERAPIST

## 2022-10-31 PROCEDURE — 84132 ASSAY OF SERUM POTASSIUM: CPT | Performed by: ANESTHESIOLOGY

## 2022-10-31 PROCEDURE — 76942 ECHO GUIDE FOR BIOPSY: CPT | Performed by: ORTHOPAEDIC SURGERY

## 2022-10-31 PROCEDURE — C1713 ANCHOR/SCREW BN/BN,TIS/BN: HCPCS | Performed by: ORTHOPAEDIC SURGERY

## 2022-10-31 PROCEDURE — 97535 SELF CARE MNGMENT TRAINING: CPT | Performed by: OCCUPATIONAL THERAPIST

## 2022-10-31 PROCEDURE — 25010000002 VANCOMYCIN 10 G RECONSTITUTED SOLUTION: Performed by: ORTHOPAEDIC SURGERY

## 2022-10-31 PROCEDURE — 97530 THERAPEUTIC ACTIVITIES: CPT | Performed by: OCCUPATIONAL THERAPIST

## 2022-10-31 PROCEDURE — 63710000001 TRAZODONE 50 MG TABLET: Performed by: INTERNAL MEDICINE

## 2022-10-31 PROCEDURE — 25010000002 ONDANSETRON PER 1 MG: Performed by: NURSE ANESTHETIST, CERTIFIED REGISTERED

## 2022-10-31 PROCEDURE — 63710000001 METOPROLOL TARTRATE 25 MG TABLET: Performed by: INTERNAL MEDICINE

## 2022-10-31 PROCEDURE — 25010000002 CEFAZOLIN IN DEXTROSE 2-4 GM/100ML-% SOLUTION: Performed by: ORTHOPAEDIC SURGERY

## 2022-10-31 PROCEDURE — 63710000001 MONTELUKAST 10 MG TABLET: Performed by: INTERNAL MEDICINE

## 2022-10-31 PROCEDURE — 25010000002 HYDROMORPHONE 1 MG/ML SOLUTION

## 2022-10-31 PROCEDURE — 25010000002 DEXAMETHASONE PER 1 MG: Performed by: NURSE ANESTHETIST, CERTIFIED REGISTERED

## 2022-10-31 PROCEDURE — A9270 NON-COVERED ITEM OR SERVICE: HCPCS | Performed by: ORTHOPAEDIC SURGERY

## 2022-10-31 PROCEDURE — 82962 GLUCOSE BLOOD TEST: CPT

## 2022-10-31 PROCEDURE — 25010000002 FENTANYL CITRATE (PF) 50 MCG/ML SOLUTION: Performed by: NURSE ANESTHETIST, CERTIFIED REGISTERED

## 2022-10-31 PROCEDURE — 0 LIDOCAINE 1 % SOLUTION: Performed by: NURSE ANESTHETIST, CERTIFIED REGISTERED

## 2022-10-31 PROCEDURE — 94640 AIRWAY INHALATION TREATMENT: CPT

## 2022-10-31 DEVICE — PLT HUM EQUINOXE REPLICAT OFFST 4.5: Type: IMPLANTABLE DEVICE | Site: SHOULDER | Status: FUNCTIONAL

## 2022-10-31 DEVICE — STEM HUM PRI EQUINOXE PRESSFIT 8MM SHT: Type: IMPLANTABLE DEVICE | Site: SHOULDER | Status: FUNCTIONAL

## 2022-10-31 DEVICE — IMPLANTABLE DEVICE: Type: IMPLANTABLE DEVICE | Site: SHOULDER | Status: FUNCTIONAL

## 2022-10-31 DEVICE — SUT FW #2 W/TPR NDL 1/2 CIR 38IN 97CM 26.5MM BLU: Type: IMPLANTABLE DEVICE | Site: SHOULDER | Status: FUNCTIONAL

## 2022-10-31 DEVICE — SCRW EQUINOXE TORQ DEFINE KT SQ: Type: IMPLANTABLE DEVICE | Site: SHOULDER | Status: FUNCTIONAL

## 2022-10-31 DEVICE — TOTL SHLDR ARTHROPLASTY SYS: Type: IMPLANTABLE DEVICE | Site: SHOULDER | Status: FUNCTIONAL

## 2022-10-31 DEVICE — SMARTSET HIGH PERFORMANCE MV MEDIUM VISCOSITY BONE CEMENT 40G
Type: IMPLANTABLE DEVICE | Site: SHOULDER | Status: FUNCTIONAL
Brand: SMARTSET

## 2022-10-31 DEVICE — HEMOST ABS SURGIFOAM SZ100 8X12 10MM: Type: IMPLANTABLE DEVICE | Site: SHOULDER | Status: FUNCTIONAL

## 2022-10-31 RX ORDER — INSULIN LISPRO 100 [IU]/ML
0-7 INJECTION, SOLUTION INTRAVENOUS; SUBCUTANEOUS
Status: DISCONTINUED | OUTPATIENT
Start: 2022-10-31 | End: 2022-11-01 | Stop reason: HOSPADM

## 2022-10-31 RX ORDER — ACETAMINOPHEN 325 MG/1
650 TABLET ORAL EVERY 4 HOURS PRN
Status: DISCONTINUED | OUTPATIENT
Start: 2022-10-31 | End: 2022-11-01 | Stop reason: HOSPADM

## 2022-10-31 RX ORDER — TRANEXAMIC ACID 10 MG/ML
1000 INJECTION, SOLUTION INTRAVENOUS ONCE
Status: COMPLETED | OUTPATIENT
Start: 2022-10-31 | End: 2022-10-31

## 2022-10-31 RX ORDER — SPIRONOLACTONE 25 MG/1
25 TABLET ORAL DAILY
Status: DISCONTINUED | OUTPATIENT
Start: 2022-11-01 | End: 2022-11-01 | Stop reason: HOSPADM

## 2022-10-31 RX ORDER — HYDROMORPHONE HYDROCHLORIDE 1 MG/ML
0.5 INJECTION, SOLUTION INTRAMUSCULAR; INTRAVENOUS; SUBCUTANEOUS
Status: DISCONTINUED | OUTPATIENT
Start: 2022-10-31 | End: 2022-10-31 | Stop reason: HOSPADM

## 2022-10-31 RX ORDER — SODIUM CHLORIDE 0.9 % (FLUSH) 0.9 %
3 SYRINGE (ML) INJECTION EVERY 12 HOURS SCHEDULED
Status: DISCONTINUED | OUTPATIENT
Start: 2022-10-31 | End: 2022-10-31 | Stop reason: HOSPADM

## 2022-10-31 RX ORDER — INSULIN LISPRO 100 [IU]/ML
0-7 INJECTION, SOLUTION INTRAVENOUS; SUBCUTANEOUS ONCE
Status: DISCONTINUED | OUTPATIENT
Start: 2022-10-31 | End: 2022-11-01 | Stop reason: HOSPADM

## 2022-10-31 RX ORDER — MELOXICAM 15 MG/1
15 TABLET ORAL DAILY
Status: DISCONTINUED | OUTPATIENT
Start: 2022-11-01 | End: 2022-11-01 | Stop reason: HOSPADM

## 2022-10-31 RX ORDER — ATORVASTATIN CALCIUM 20 MG/1
20 TABLET, FILM COATED ORAL DAILY
Status: DISCONTINUED | OUTPATIENT
Start: 2022-11-01 | End: 2022-11-01 | Stop reason: HOSPADM

## 2022-10-31 RX ORDER — LABETALOL HYDROCHLORIDE 5 MG/ML
5 INJECTION, SOLUTION INTRAVENOUS
Status: DISCONTINUED | OUTPATIENT
Start: 2022-10-31 | End: 2022-10-31 | Stop reason: HOSPADM

## 2022-10-31 RX ORDER — LEVOTHYROXINE SODIUM 88 UG/1
88 TABLET ORAL
Status: DISCONTINUED | OUTPATIENT
Start: 2022-11-01 | End: 2022-11-01 | Stop reason: HOSPADM

## 2022-10-31 RX ORDER — DROPERIDOL 2.5 MG/ML
0.62 INJECTION, SOLUTION INTRAMUSCULAR; INTRAVENOUS
Status: DISCONTINUED | OUTPATIENT
Start: 2022-10-31 | End: 2022-10-31 | Stop reason: HOSPADM

## 2022-10-31 RX ORDER — OXYCODONE HYDROCHLORIDE 5 MG/1
5 TABLET ORAL EVERY 4 HOURS PRN
Status: DISCONTINUED | OUTPATIENT
Start: 2022-10-31 | End: 2022-11-01 | Stop reason: HOSPADM

## 2022-10-31 RX ORDER — BUPIVACAINE HYDROCHLORIDE 2.5 MG/ML
INJECTION, SOLUTION EPIDURAL; INFILTRATION; INTRACAUDAL
Status: COMPLETED | OUTPATIENT
Start: 2022-10-31 | End: 2022-10-31

## 2022-10-31 RX ORDER — DEXTROSE MONOHYDRATE 25 G/50ML
25 INJECTION, SOLUTION INTRAVENOUS
Status: DISCONTINUED | OUTPATIENT
Start: 2022-10-31 | End: 2022-11-01 | Stop reason: HOSPADM

## 2022-10-31 RX ORDER — BUPIVACAINE HCL/0.9 % NACL/PF 0.125 %
PLASTIC BAG, INJECTION (ML) EPIDURAL AS NEEDED
Status: DISCONTINUED | OUTPATIENT
Start: 2022-10-31 | End: 2022-10-31 | Stop reason: SURG

## 2022-10-31 RX ORDER — PROMETHAZINE HYDROCHLORIDE 25 MG/1
25 TABLET ORAL ONCE AS NEEDED
Status: DISCONTINUED | OUTPATIENT
Start: 2022-10-31 | End: 2022-10-31 | Stop reason: HOSPADM

## 2022-10-31 RX ORDER — SODIUM CHLORIDE 450 MG/100ML
50 INJECTION, SOLUTION INTRAVENOUS CONTINUOUS
Status: DISCONTINUED | OUTPATIENT
Start: 2022-10-31 | End: 2022-11-01 | Stop reason: HOSPADM

## 2022-10-31 RX ORDER — SODIUM CHLORIDE 0.9 % (FLUSH) 0.9 %
3-10 SYRINGE (ML) INJECTION AS NEEDED
Status: DISCONTINUED | OUTPATIENT
Start: 2022-10-31 | End: 2022-10-31 | Stop reason: HOSPADM

## 2022-10-31 RX ORDER — NALOXONE HCL 0.4 MG/ML
0.1 VIAL (ML) INJECTION
Status: DISCONTINUED | OUTPATIENT
Start: 2022-10-31 | End: 2022-11-01 | Stop reason: HOSPADM

## 2022-10-31 RX ORDER — LABETALOL HYDROCHLORIDE 5 MG/ML
10 INJECTION, SOLUTION INTRAVENOUS EVERY 4 HOURS PRN
Status: DISCONTINUED | OUTPATIENT
Start: 2022-10-31 | End: 2022-11-01 | Stop reason: HOSPADM

## 2022-10-31 RX ORDER — LIDOCAINE HYDROCHLORIDE 10 MG/ML
INJECTION, SOLUTION INFILTRATION; PERINEURAL AS NEEDED
Status: DISCONTINUED | OUTPATIENT
Start: 2022-10-31 | End: 2022-10-31 | Stop reason: SURG

## 2022-10-31 RX ORDER — ACETAMINOPHEN 500 MG
1000 TABLET ORAL ONCE
Status: COMPLETED | OUTPATIENT
Start: 2022-10-31 | End: 2022-10-31

## 2022-10-31 RX ORDER — FAMOTIDINE 20 MG/1
20 TABLET, FILM COATED ORAL ONCE
Status: COMPLETED | OUTPATIENT
Start: 2022-10-31 | End: 2022-10-31

## 2022-10-31 RX ORDER — PROMETHAZINE HYDROCHLORIDE 25 MG/1
25 SUPPOSITORY RECTAL ONCE AS NEEDED
Status: DISCONTINUED | OUTPATIENT
Start: 2022-10-31 | End: 2022-10-31 | Stop reason: HOSPADM

## 2022-10-31 RX ORDER — MIDAZOLAM HYDROCHLORIDE 1 MG/ML
0.5 INJECTION INTRAMUSCULAR; INTRAVENOUS
Status: DISCONTINUED | OUTPATIENT
Start: 2022-10-31 | End: 2022-10-31 | Stop reason: HOSPADM

## 2022-10-31 RX ORDER — NALOXONE HCL 0.4 MG/ML
0.4 VIAL (ML) INJECTION AS NEEDED
Status: DISCONTINUED | OUTPATIENT
Start: 2022-10-31 | End: 2022-10-31 | Stop reason: HOSPADM

## 2022-10-31 RX ORDER — ONDANSETRON 2 MG/ML
4 INJECTION INTRAMUSCULAR; INTRAVENOUS ONCE AS NEEDED
Status: DISCONTINUED | OUTPATIENT
Start: 2022-10-31 | End: 2022-10-31 | Stop reason: HOSPADM

## 2022-10-31 RX ORDER — PROPOFOL 10 MG/ML
VIAL (ML) INTRAVENOUS AS NEEDED
Status: DISCONTINUED | OUTPATIENT
Start: 2022-10-31 | End: 2022-10-31 | Stop reason: SURG

## 2022-10-31 RX ORDER — EPHEDRINE SULFATE 50 MG/ML
INJECTION, SOLUTION INTRAVENOUS AS NEEDED
Status: DISCONTINUED | OUTPATIENT
Start: 2022-10-31 | End: 2022-10-31 | Stop reason: SURG

## 2022-10-31 RX ORDER — IPRATROPIUM BROMIDE AND ALBUTEROL SULFATE 2.5; .5 MG/3ML; MG/3ML
3 SOLUTION RESPIRATORY (INHALATION) ONCE AS NEEDED
Status: DISCONTINUED | OUTPATIENT
Start: 2022-10-31 | End: 2022-10-31 | Stop reason: HOSPADM

## 2022-10-31 RX ORDER — ONDANSETRON 2 MG/ML
INJECTION INTRAMUSCULAR; INTRAVENOUS AS NEEDED
Status: DISCONTINUED | OUTPATIENT
Start: 2022-10-31 | End: 2022-10-31 | Stop reason: SURG

## 2022-10-31 RX ORDER — ROPIVACAINE HYDROCHLORIDE 2 MG/ML
INJECTION, SOLUTION EPIDURAL; INFILTRATION; PERINEURAL CONTINUOUS
Status: DISCONTINUED | OUTPATIENT
Start: 2022-10-31 | End: 2022-11-01 | Stop reason: HOSPADM

## 2022-10-31 RX ORDER — HYDROMORPHONE HYDROCHLORIDE 1 MG/ML
0.5 INJECTION, SOLUTION INTRAMUSCULAR; INTRAVENOUS; SUBCUTANEOUS
Status: DISCONTINUED | OUTPATIENT
Start: 2022-10-31 | End: 2022-11-01 | Stop reason: HOSPADM

## 2022-10-31 RX ORDER — MAGNESIUM HYDROXIDE 1200 MG/15ML
LIQUID ORAL AS NEEDED
Status: DISCONTINUED | OUTPATIENT
Start: 2022-10-31 | End: 2022-10-31 | Stop reason: HOSPADM

## 2022-10-31 RX ORDER — ACETAMINOPHEN 650 MG/1
650 SUPPOSITORY RECTAL EVERY 4 HOURS PRN
Status: DISCONTINUED | OUTPATIENT
Start: 2022-10-31 | End: 2022-11-01 | Stop reason: HOSPADM

## 2022-10-31 RX ORDER — ROCURONIUM BROMIDE 10 MG/ML
INJECTION, SOLUTION INTRAVENOUS AS NEEDED
Status: DISCONTINUED | OUTPATIENT
Start: 2022-10-31 | End: 2022-10-31 | Stop reason: SURG

## 2022-10-31 RX ORDER — FENTANYL CITRATE 50 UG/ML
50 INJECTION, SOLUTION INTRAMUSCULAR; INTRAVENOUS
Status: DISCONTINUED | OUTPATIENT
Start: 2022-10-31 | End: 2022-10-31 | Stop reason: HOSPADM

## 2022-10-31 RX ORDER — FENTANYL CITRATE 50 UG/ML
INJECTION, SOLUTION INTRAMUSCULAR; INTRAVENOUS
Status: COMPLETED
Start: 2022-10-31 | End: 2022-10-31

## 2022-10-31 RX ORDER — CETIRIZINE HYDROCHLORIDE 10 MG/1
5 TABLET ORAL DAILY
Status: DISCONTINUED | OUTPATIENT
Start: 2022-11-01 | End: 2022-11-01 | Stop reason: HOSPADM

## 2022-10-31 RX ORDER — ONDANSETRON 4 MG/1
4 TABLET, FILM COATED ORAL EVERY 6 HOURS PRN
Status: DISCONTINUED | OUTPATIENT
Start: 2022-10-31 | End: 2022-11-01 | Stop reason: HOSPADM

## 2022-10-31 RX ORDER — BUPROPION HYDROCHLORIDE 150 MG/1
300 TABLET ORAL DAILY
Status: DISCONTINUED | OUTPATIENT
Start: 2022-11-01 | End: 2022-11-01 | Stop reason: HOSPADM

## 2022-10-31 RX ORDER — MONTELUKAST SODIUM 10 MG/1
10 TABLET ORAL NIGHTLY
Status: DISCONTINUED | OUTPATIENT
Start: 2022-10-31 | End: 2022-11-01 | Stop reason: HOSPADM

## 2022-10-31 RX ORDER — HYDROCODONE BITARTRATE AND ACETAMINOPHEN 5; 325 MG/1; MG/1
1 TABLET ORAL ONCE AS NEEDED
Status: DISCONTINUED | OUTPATIENT
Start: 2022-10-31 | End: 2022-10-31 | Stop reason: HOSPADM

## 2022-10-31 RX ORDER — LIDOCAINE HYDROCHLORIDE 10 MG/ML
0.5 INJECTION, SOLUTION EPIDURAL; INFILTRATION; INTRACAUDAL; PERINEURAL ONCE AS NEEDED
Status: COMPLETED | OUTPATIENT
Start: 2022-10-31 | End: 2022-10-31

## 2022-10-31 RX ORDER — CEFAZOLIN SODIUM 2 G/100ML
2 INJECTION, SOLUTION INTRAVENOUS ONCE
Status: COMPLETED | OUTPATIENT
Start: 2022-10-31 | End: 2022-10-31

## 2022-10-31 RX ORDER — FENTANYL CITRATE 50 UG/ML
INJECTION, SOLUTION INTRAMUSCULAR; INTRAVENOUS
Status: COMPLETED | OUTPATIENT
Start: 2022-10-31 | End: 2022-10-31

## 2022-10-31 RX ORDER — ONDANSETRON 2 MG/ML
4 INJECTION INTRAMUSCULAR; INTRAVENOUS EVERY 6 HOURS PRN
Status: DISCONTINUED | OUTPATIENT
Start: 2022-10-31 | End: 2022-11-01 | Stop reason: HOSPADM

## 2022-10-31 RX ORDER — FUROSEMIDE 40 MG/1
40 TABLET ORAL DAILY
Status: DISCONTINUED | OUTPATIENT
Start: 2022-11-01 | End: 2022-11-01 | Stop reason: HOSPADM

## 2022-10-31 RX ORDER — TRAZODONE HYDROCHLORIDE 50 MG/1
50 TABLET ORAL NIGHTLY
Status: DISCONTINUED | OUTPATIENT
Start: 2022-10-31 | End: 2022-11-01 | Stop reason: HOSPADM

## 2022-10-31 RX ORDER — NICOTINE POLACRILEX 4 MG
15 LOZENGE BUCCAL
Status: DISCONTINUED | OUTPATIENT
Start: 2022-10-31 | End: 2022-11-01 | Stop reason: HOSPADM

## 2022-10-31 RX ORDER — DROPERIDOL 2.5 MG/ML
0.62 INJECTION, SOLUTION INTRAMUSCULAR; INTRAVENOUS ONCE AS NEEDED
Status: DISCONTINUED | OUTPATIENT
Start: 2022-10-31 | End: 2022-10-31 | Stop reason: HOSPADM

## 2022-10-31 RX ORDER — BUDESONIDE AND FORMOTEROL FUMARATE DIHYDRATE 80; 4.5 UG/1; UG/1
2 AEROSOL RESPIRATORY (INHALATION)
Status: DISCONTINUED | OUTPATIENT
Start: 2022-10-31 | End: 2022-11-01 | Stop reason: HOSPADM

## 2022-10-31 RX ORDER — DEXAMETHASONE SODIUM PHOSPHATE 4 MG/ML
INJECTION, SOLUTION INTRA-ARTICULAR; INTRALESIONAL; INTRAMUSCULAR; INTRAVENOUS; SOFT TISSUE AS NEEDED
Status: DISCONTINUED | OUTPATIENT
Start: 2022-10-31 | End: 2022-10-31 | Stop reason: SURG

## 2022-10-31 RX ORDER — SODIUM CHLORIDE, SODIUM LACTATE, POTASSIUM CHLORIDE, CALCIUM CHLORIDE 600; 310; 30; 20 MG/100ML; MG/100ML; MG/100ML; MG/100ML
9 INJECTION, SOLUTION INTRAVENOUS CONTINUOUS
Status: DISCONTINUED | OUTPATIENT
Start: 2022-10-31 | End: 2022-11-01 | Stop reason: HOSPADM

## 2022-10-31 RX ORDER — FLUOXETINE HYDROCHLORIDE 20 MG/1
40 CAPSULE ORAL DAILY
Status: DISCONTINUED | OUTPATIENT
Start: 2022-11-01 | End: 2022-11-01 | Stop reason: HOSPADM

## 2022-10-31 RX ORDER — GLYCOPYRROLATE 0.2 MG/ML
INJECTION INTRAMUSCULAR; INTRAVENOUS AS NEEDED
Status: DISCONTINUED | OUTPATIENT
Start: 2022-10-31 | End: 2022-10-31 | Stop reason: SURG

## 2022-10-31 RX ADMIN — METOPROLOL TARTRATE 25 MG: 25 TABLET, FILM COATED ORAL at 21:00

## 2022-10-31 RX ADMIN — Medication 100 MCG: at 10:21

## 2022-10-31 RX ADMIN — HYDROMORPHONE HYDROCHLORIDE 0.5 MG: 1 INJECTION, SOLUTION INTRAMUSCULAR; INTRAVENOUS; SUBCUTANEOUS at 12:17

## 2022-10-31 RX ADMIN — FENTANYL CITRATE 50 MCG: 50 INJECTION, SOLUTION INTRAMUSCULAR; INTRAVENOUS at 11:47

## 2022-10-31 RX ADMIN — MONTELUKAST 10 MG: 10 TABLET, FILM COATED ORAL at 21:00

## 2022-10-31 RX ADMIN — Medication 100 MCG: at 10:20

## 2022-10-31 RX ADMIN — SODIUM CHLORIDE, POTASSIUM CHLORIDE, SODIUM LACTATE AND CALCIUM CHLORIDE 9 ML/HR: 600; 310; 30; 20 INJECTION, SOLUTION INTRAVENOUS at 08:22

## 2022-10-31 RX ADMIN — PROPOFOL 150 MG: 10 INJECTION, EMULSION INTRAVENOUS at 09:36

## 2022-10-31 RX ADMIN — BUPIVACAINE HYDROCHLORIDE 15 ML: 2.5 INJECTION, SOLUTION EPIDURAL; INFILTRATION; INTRACAUDAL at 08:30

## 2022-10-31 RX ADMIN — FAMOTIDINE 20 MG: 20 TABLET ORAL at 08:22

## 2022-10-31 RX ADMIN — Medication 100 MCG: at 10:35

## 2022-10-31 RX ADMIN — BUDESONIDE AND FORMOTEROL FUMARATE DIHYDRATE 2 PUFF: 80; 4.5 AEROSOL RESPIRATORY (INHALATION) at 20:37

## 2022-10-31 RX ADMIN — HYDROMORPHONE HYDROCHLORIDE 0.5 MG: 1 INJECTION, SOLUTION INTRAMUSCULAR; INTRAVENOUS; SUBCUTANEOUS at 19:24

## 2022-10-31 RX ADMIN — LIDOCAINE HYDROCHLORIDE 0.5 ML: 10 INJECTION, SOLUTION EPIDURAL; INFILTRATION; INTRACAUDAL; PERINEURAL at 08:22

## 2022-10-31 RX ADMIN — TRANEXAMIC ACID 1000 MG: 10 INJECTION, SOLUTION INTRAVENOUS at 09:42

## 2022-10-31 RX ADMIN — Medication 1000 MG: at 11:24

## 2022-10-31 RX ADMIN — CEFAZOLIN SODIUM 2 G: 2 INJECTION, SOLUTION INTRAVENOUS at 08:50

## 2022-10-31 RX ADMIN — ACETAMINOPHEN 1000 MG: 500 TABLET ORAL at 08:22

## 2022-10-31 RX ADMIN — PROPOFOL 50 MG: 10 INJECTION, EMULSION INTRAVENOUS at 09:43

## 2022-10-31 RX ADMIN — TRANEXAMIC ACID 1000 MG: 10 INJECTION, SOLUTION INTRAVENOUS at 10:41

## 2022-10-31 RX ADMIN — Medication 100 MCG: at 10:51

## 2022-10-31 RX ADMIN — EPHEDRINE SULFATE 10 MG: 50 INJECTION INTRAVENOUS at 10:14

## 2022-10-31 RX ADMIN — INSULIN LISPRO 3 UNITS: 100 INJECTION, SOLUTION INTRAVENOUS; SUBCUTANEOUS at 17:35

## 2022-10-31 RX ADMIN — TRAZODONE HYDROCHLORIDE 50 MG: 50 TABLET ORAL at 21:00

## 2022-10-31 RX ADMIN — ONDANSETRON 4 MG: 2 INJECTION INTRAMUSCULAR; INTRAVENOUS at 09:46

## 2022-10-31 RX ADMIN — DEXAMETHASONE SODIUM PHOSPHATE 8 MG: 4 INJECTION, SOLUTION INTRA-ARTICULAR; INTRALESIONAL; INTRAMUSCULAR; INTRAVENOUS; SOFT TISSUE at 09:42

## 2022-10-31 RX ADMIN — VANCOMYCIN HYDROCHLORIDE 1250 MG: 10 INJECTION, POWDER, LYOPHILIZED, FOR SOLUTION INTRAVENOUS at 21:05

## 2022-10-31 RX ADMIN — SUGAMMADEX 200 MG: 100 INJECTION, SOLUTION INTRAVENOUS at 10:56

## 2022-10-31 RX ADMIN — OXYCODONE 5 MG: 5 TABLET ORAL at 17:35

## 2022-10-31 RX ADMIN — GLYCOPYRROLATE 0.4 MCG: 0.2 INJECTION INTRAMUSCULAR; INTRAVENOUS at 10:17

## 2022-10-31 RX ADMIN — Medication 1250 MG: at 09:22

## 2022-10-31 RX ADMIN — FENTANYL CITRATE 100 MCG: 50 INJECTION, SOLUTION INTRAMUSCULAR; INTRAVENOUS at 08:30

## 2022-10-31 RX ADMIN — EPHEDRINE SULFATE 10 MG: 50 INJECTION INTRAVENOUS at 10:45

## 2022-10-31 RX ADMIN — SODIUM CHLORIDE 50 ML/HR: 4.5 INJECTION, SOLUTION INTRAVENOUS at 18:05

## 2022-10-31 RX ADMIN — LIDOCAINE HYDROCHLORIDE 40 MG: 10 INJECTION, SOLUTION INFILTRATION; PERINEURAL at 09:36

## 2022-10-31 RX ADMIN — FENTANYL CITRATE 50 MCG: 50 INJECTION, SOLUTION INTRAMUSCULAR; INTRAVENOUS at 11:37

## 2022-10-31 RX ADMIN — INSULIN DETEMIR 10 UNITS: 100 INJECTION, SOLUTION SUBCUTANEOUS at 20:59

## 2022-10-31 RX ADMIN — ROCURONIUM BROMIDE 50 MG: 10 INJECTION, SOLUTION INTRAVENOUS at 09:36

## 2022-11-01 VITALS
RESPIRATION RATE: 8 BRPM | BODY MASS INDEX: 33.42 KG/M2 | HEIGHT: 61 IN | TEMPERATURE: 98 F | HEART RATE: 71 BPM | OXYGEN SATURATION: 95 % | DIASTOLIC BLOOD PRESSURE: 70 MMHG | SYSTOLIC BLOOD PRESSURE: 123 MMHG | WEIGHT: 177 LBS

## 2022-11-01 PROBLEM — G89.18 POSTOPERATIVE PAIN: Status: ACTIVE | Noted: 2022-11-01

## 2022-11-01 LAB
ANION GAP SERPL CALCULATED.3IONS-SCNC: 9 MMOL/L (ref 5–15)
BASOPHILS # BLD AUTO: 0.02 10*3/MM3 (ref 0–0.2)
BASOPHILS NFR BLD AUTO: 0.2 % (ref 0–1.5)
BUN SERPL-MCNC: 10 MG/DL (ref 8–23)
BUN/CREAT SERPL: 14.3 (ref 7–25)
CALCIUM SPEC-SCNC: 7.7 MG/DL (ref 8.6–10.5)
CHLORIDE SERPL-SCNC: 105 MMOL/L (ref 98–107)
CO2 SERPL-SCNC: 26 MMOL/L (ref 22–29)
CREAT SERPL-MCNC: 0.7 MG/DL (ref 0.57–1)
DEPRECATED RDW RBC AUTO: 45.2 FL (ref 37–54)
EGFRCR SERPLBLD CKD-EPI 2021: 93.8 ML/MIN/1.73
EOSINOPHIL # BLD AUTO: 0 10*3/MM3 (ref 0–0.4)
EOSINOPHIL NFR BLD AUTO: 0 % (ref 0.3–6.2)
ERYTHROCYTE [DISTWIDTH] IN BLOOD BY AUTOMATED COUNT: 13.2 % (ref 12.3–15.4)
GLUCOSE BLDC GLUCOMTR-MCNC: 105 MG/DL (ref 70–130)
GLUCOSE BLDC GLUCOMTR-MCNC: 139 MG/DL (ref 70–130)
GLUCOSE SERPL-MCNC: 122 MG/DL (ref 65–99)
HCT VFR BLD AUTO: 35.2 % (ref 34–46.6)
HGB BLD-MCNC: 11.4 G/DL (ref 12–15.9)
IMM GRANULOCYTES # BLD AUTO: 0.05 10*3/MM3 (ref 0–0.05)
IMM GRANULOCYTES NFR BLD AUTO: 0.5 % (ref 0–0.5)
LYMPHOCYTES # BLD AUTO: 2.1 10*3/MM3 (ref 0.7–3.1)
LYMPHOCYTES NFR BLD AUTO: 19.3 % (ref 19.6–45.3)
MCH RBC QN AUTO: 30.6 PG (ref 26.6–33)
MCHC RBC AUTO-ENTMCNC: 32.4 G/DL (ref 31.5–35.7)
MCV RBC AUTO: 94.4 FL (ref 79–97)
MONOCYTES # BLD AUTO: 0.92 10*3/MM3 (ref 0.1–0.9)
MONOCYTES NFR BLD AUTO: 8.5 % (ref 5–12)
NEUTROPHILS NFR BLD AUTO: 7.79 10*3/MM3 (ref 1.7–7)
NEUTROPHILS NFR BLD AUTO: 71.5 % (ref 42.7–76)
NRBC BLD AUTO-RTO: 0 /100 WBC (ref 0–0.2)
PLATELET # BLD AUTO: 234 10*3/MM3 (ref 140–450)
PMV BLD AUTO: 9.7 FL (ref 6–12)
POTASSIUM SERPL-SCNC: 4.2 MMOL/L (ref 3.5–5.2)
RBC # BLD AUTO: 3.73 10*6/MM3 (ref 3.77–5.28)
SODIUM SERPL-SCNC: 140 MMOL/L (ref 136–145)
WBC NRBC COR # BLD: 10.88 10*3/MM3 (ref 3.4–10.8)

## 2022-11-01 PROCEDURE — 82962 GLUCOSE BLOOD TEST: CPT

## 2022-11-01 PROCEDURE — A9270 NON-COVERED ITEM OR SERVICE: HCPCS | Performed by: INTERNAL MEDICINE

## 2022-11-01 PROCEDURE — 63710000001 MELOXICAM 15 MG TABLET: Performed by: INTERNAL MEDICINE

## 2022-11-01 PROCEDURE — 63710000001 FLUOXETINE 20 MG CAPSULE: Performed by: INTERNAL MEDICINE

## 2022-11-01 PROCEDURE — 63710000001 LEVOTHYROXINE 88 MCG TABLET: Performed by: INTERNAL MEDICINE

## 2022-11-01 PROCEDURE — 94799 UNLISTED PULMONARY SVC/PX: CPT

## 2022-11-01 PROCEDURE — 85025 COMPLETE CBC W/AUTO DIFF WBC: CPT | Performed by: ORTHOPAEDIC SURGERY

## 2022-11-01 PROCEDURE — 63710000001 FUROSEMIDE 40 MG TABLET: Performed by: INTERNAL MEDICINE

## 2022-11-01 PROCEDURE — 97535 SELF CARE MNGMENT TRAINING: CPT | Performed by: OCCUPATIONAL THERAPIST

## 2022-11-01 PROCEDURE — 97110 THERAPEUTIC EXERCISES: CPT | Performed by: OCCUPATIONAL THERAPIST

## 2022-11-01 PROCEDURE — A9270 NON-COVERED ITEM OR SERVICE: HCPCS | Performed by: ORTHOPAEDIC SURGERY

## 2022-11-01 PROCEDURE — 63710000001 SPIRONOLACTONE 25 MG TABLET: Performed by: INTERNAL MEDICINE

## 2022-11-01 PROCEDURE — 63710000001 METOPROLOL TARTRATE 25 MG TABLET: Performed by: INTERNAL MEDICINE

## 2022-11-01 PROCEDURE — 80048 BASIC METABOLIC PNL TOTAL CA: CPT | Performed by: ORTHOPAEDIC SURGERY

## 2022-11-01 PROCEDURE — 97530 THERAPEUTIC ACTIVITIES: CPT | Performed by: OCCUPATIONAL THERAPIST

## 2022-11-01 PROCEDURE — 63710000001 CETIRIZINE 10 MG TABLET: Performed by: INTERNAL MEDICINE

## 2022-11-01 PROCEDURE — 63710000001 OXYCODONE 5 MG TABLET: Performed by: ORTHOPAEDIC SURGERY

## 2022-11-01 PROCEDURE — 63710000001 ATORVASTATIN 20 MG TABLET: Performed by: INTERNAL MEDICINE

## 2022-11-01 PROCEDURE — 63710000001 BUPROPION XL 150 MG TABLET SUSTAINED-RELEASE 24 HOUR: Performed by: INTERNAL MEDICINE

## 2022-11-01 RX ORDER — POLYETHYLENE GLYCOL 3350 17 G/17G
17 POWDER, FOR SOLUTION ORAL DAILY
Qty: 238 G | Refills: 0 | Status: SHIPPED | OUTPATIENT
Start: 2022-11-01 | End: 2022-11-16

## 2022-11-01 RX ORDER — ROPIVACAINE HYDROCHLORIDE 2 MG/ML
1 INJECTION, SOLUTION EPIDURAL; INFILTRATION; PERINEURAL CONTINUOUS
Start: 2022-11-01

## 2022-11-01 RX ORDER — ACETAMINOPHEN 500 MG
1000 TABLET ORAL EVERY 8 HOURS
Qty: 42 TABLET | Refills: 0
Start: 2022-11-01 | End: 2022-11-08

## 2022-11-01 RX ORDER — OXYCODONE HYDROCHLORIDE 5 MG/1
5 TABLET ORAL EVERY 4 HOURS PRN
Qty: 25 TABLET | Refills: 0 | Status: ON HOLD | OUTPATIENT
Start: 2022-11-01 | End: 2022-11-29 | Stop reason: SDUPTHER

## 2022-11-01 RX ADMIN — OXYCODONE 5 MG: 5 TABLET ORAL at 11:04

## 2022-11-01 RX ADMIN — ATORVASTATIN CALCIUM 20 MG: 20 TABLET, FILM COATED ORAL at 08:37

## 2022-11-01 RX ADMIN — FUROSEMIDE 40 MG: 40 TABLET ORAL at 08:37

## 2022-11-01 RX ADMIN — BUDESONIDE AND FORMOTEROL FUMARATE DIHYDRATE 2 PUFF: 80; 4.5 AEROSOL RESPIRATORY (INHALATION) at 08:38

## 2022-11-01 RX ADMIN — SPIRONOLACTONE 25 MG: 25 TABLET ORAL at 08:37

## 2022-11-01 RX ADMIN — OXYCODONE 5 MG: 5 TABLET ORAL at 05:21

## 2022-11-01 RX ADMIN — BUPROPION HYDROCHLORIDE 300 MG: 150 TABLET, FILM COATED, EXTENDED RELEASE ORAL at 08:37

## 2022-11-01 RX ADMIN — MELOXICAM 15 MG: 15 TABLET ORAL at 08:38

## 2022-11-01 RX ADMIN — METOPROLOL TARTRATE 25 MG: 25 TABLET, FILM COATED ORAL at 09:06

## 2022-11-01 RX ADMIN — LEVOTHYROXINE SODIUM 88 MCG: 88 TABLET ORAL at 05:16

## 2022-11-01 RX ADMIN — CETIRIZINE HYDROCHLORIDE 5 MG: 10 TABLET, FILM COATED ORAL at 08:36

## 2022-11-01 RX ADMIN — FLUOXETINE 40 MG: 20 CAPSULE ORAL at 08:37

## 2022-11-01 NOTE — DISCHARGE SUMMARY
Patient Name: Ro Bonds  MRN: [id number]  : 1955  DOS: 2022    Attending: Jennifer Velasquez MD    Primary Care Provider: Elisa Bedolla MD    Date of Admission:.10/31/2022  7:37 AM    Date of Discharge:  2022    Discharge Diagnosis:     Status post total shoulder arthroplasty, left    Paroxysmal atrial flutter (HCC)    Essential hypertension    Type 2 diabetes mellitus without complication, without long-term current use of insulin (HCC)    JOSEPH on CPAP    Obesity    Postoperative pain      Hospital Course    At admit:  Patient is a pleasant 67 y.o. female presented for scheduled surgery by Dr. Jones.     She underwent left total shoulder arthroplasty under GA and a block, tolerated surgery well, was admitted for further management.     Seen in PACU, doing fairly well, no complains of nausea, vomiting, or shortness of breath.     Reviewed with patient her past medical history including history of asthma, uses inhalers at home, has been fairly stable recently, required a course of steroid taper about a month ago.  Not on steroids scheduled, no home oxygen, she is on nocturnal CPAP for sleep apnea.    After admit:  Patient was provided pain medications as needed for pain control, along with interscalene nerve block infusion of Ropivacaine    Adjustments were made to pain medications to optimize postop pain management.     Risks and benefits of opiate medications discussed with patient. CLIFFORD report on chart was reviewed.    The patient was seen by OT and was taught exercises for left arm.  The patient used an IS for atelectasis prophylaxis and mechanicals for DVT prophylaxis.    Home medications were resumed as appropriate, and labs were monitored and remained fairly stable.     With the progress she has made,  is ready for DC home today.      The patient will have an Infupump ( instructed on it during this admit)  Discussed with patient regarding plan and she shows  "understanding and agreement.        Procedures Performed  Procedure(s):  TOTAL SHOULDER ARTHROPLASTY, POSSIBLE REVERSE, BICEPS TENODESIS - LEFT       Pertinent Test Results:    I reviewed the patient's new clinical results.   Results from last 7 days   Lab Units 22  0753   WBC 10*3/mm3 10.88*   HEMOGLOBIN g/dL 11.4*   HEMATOCRIT % 35.2   PLATELETS 10*3/mm3 234     Results from last 7 days   Lab Units 22  0753 10/31/22  1721 10/31/22  0837   SODIUM mmol/L 140  --   --    POTASSIUM mmol/L 4.2 4.8 3.9   CHLORIDE mmol/L 105  --   --    CO2 mmol/L 26.0  --   --    BUN mg/dL 10  --   --    CREATININE mg/dL 0.70  --   --    CALCIUM mg/dL 7.7*  --   --    GLUCOSE mg/dL 122*  --   --      I reviewed the patient's new imaging including images and reports.      Occupational Therapy  Progress: improving  Outcome Evaluation: Pt ambulated 250 feet and ascended 3 steps/ descended 2 steps with supervision on RA- mild dyspnea and no desat under 92%. Pt tolerated L shoulder PROM , IR chest/ER 30. Family teaching set up for this time, however spouse reports he has been ill and needed to leave for MD appointment. Pt with good recall of teaching from initial eval and able to verbalize to OT correct sling application. She declined need for HH Kort visit. Recommend DC home with initial  family assist.       Discharge Assessment:       Visit Vitals  /72 (BP Location: Right arm, Patient Position: Lying)   Pulse 67   Temp 98.3 °F (36.8 °C) (Oral)   Resp 16   Ht 154.9 cm (61\")   Wt 80.3 kg (177 lb)   LMP 1995 (Exact Date) Comment: LMP ~ AGE 40   SpO2 95%   BMI 33.44 kg/m²     Temp (24hrs), Av.9 °F (36.6 °C), Min:96.5 °F (35.8 °C), Max:98.4 °F (36.9 °C)      General Appearance:    Alert, cooperative, in no acute distress   Lungs:     Clear to auscultation,respirations regular, even and   unlabored    Heart:    Regular rhythm and normal rate, normal S1 and S2    Abdomen:     Normal bowel sounds, no masses, " no organomegaly, soft        non-tender, non-distended, no guarding, no rebound                 tenderness   Extremities:   LUE in a sling, CDI aquacel dressing over leftshoulder incision . Interscalene nerve block cath present.  Distal pulses, cap refill,  intact. Movement and sensation intact distally.     Pulses:   Pulses palpable and equal bilaterally   Skin:   No bleeding, bruising or rash        Discharge Disposition:home.          Discharge Medications      New Medications      Instructions Start Date   acetaminophen 500 MG tablet  Commonly known as: TYLENOL   1,000 mg, Oral, Every 8 Hours, Take every 8 hours  as needed after 1 week      oxyCODONE 5 MG immediate release tablet  Commonly known as: ROXICODONE   Take 1 tablet by mouth Every 4 Hours As Needed for Moderate Pain (if Tylenol and nerve block not helping).      polyethylene glycol 17 GM/SCOOP powder  Commonly known as: MIRALAX   Mix 17g ( 1 capful) in water and drink by mouth daily.      ropivacaine 0.2 % infusion (INFUSYSTEM)  Commonly known as: NAROPIN   1 mL/hr (2 mg/hr), Peripheral Nerve, Continuous         Continue These Medications      Instructions Start Date   albuterol sulfate  (90 Base) MCG/ACT inhaler  Commonly known as: PROVENTIL HFA;VENTOLIN HFA;PROAIR HFA   Inhale 2 puffs Every 4 (Four) Hours As Needed.      alendronate 70 MG tablet  Commonly known as: FOSAMAX   1 tablet, Oral, Weekly, Every Sunday      atorvastatin 20 MG tablet  Commonly known as: LIPITOR   20 mg, Oral, Daily      azelastine 0.1 % nasal spray  Commonly known as: ASTELIN   2 sprays, Nasal, 2 Times Daily      buPROPion  MG 24 hr tablet  Commonly known as: WELLBUTRIN XL   1 tablet, Oral, Daily      EPINEPHrine 0.3 MG/0.3ML solution auto-injector injection  Commonly known as: EPIPEN   Inject  into the appropriate muscle as directed by prescriber 1 (One) Time.      FLUoxetine 40 MG capsule  Commonly known as: PROzac   40 mg, Oral, Daily      fluticasone 50  MCG/ACT nasal spray  Commonly known as: FLONASE   2 sprays, Nasal, Daily      Fluticasone-Salmeterol 100-50 MCG/ACT DISKUS  Commonly known as: ADVAIR/WIXELA   1 puff, Inhalation, 2 Times Daily - RT      furosemide 40 MG tablet  Commonly known as: LASIX   40 mg, Oral, Daily      levothyroxine 88 MCG tablet  Commonly known as: SYNTHROID, LEVOTHROID   88 mcg, Oral, Daily      Loratadine 10 MG capsule   1 capsule, Oral, Daily      meloxicam 15 MG tablet  Commonly known as: MOBIC   15 mg, Oral, Daily      metoprolol tartrate 25 MG tablet  Commonly known as: LOPRESSOR   25 mg, Oral, 2 Times Daily      montelukast 10 MG tablet  Commonly known as: SINGULAIR   1 tablet, Oral, Daily      sertraline 50 MG tablet  Commonly known as: ZOLOFT   50 mg, Oral, Daily      spironolactone 25 MG tablet  Commonly known as: ALDACTONE   25 mg, Oral, Daily      traZODone 50 MG tablet  Commonly known as: DESYREL   1 tablet, Oral, Nightly      vitamin D 1.25 MG (60204 UT) capsule capsule  Commonly known as: ERGOCALCIFEROL   50,000 Units, Oral, Every 7 Days, SUNDAYS             Discharge Diet:     Activity at Discharge:   NWB LUE, ROM elbow, wrist, and hand per 's instructions.    Follow-up Appointments  Oneil Jones MD per his orders.          Jennifer Velasquez MD  11/01/22  09:48 EDT

## 2022-11-01 NOTE — PLAN OF CARE
Goal Outcome Evaluation:  Plan of Care Reviewed With: patient A&OX4 VSS Aquacell to left shoulder is C/D/I. Pain controlled with Infupump. Ambulating with standby assist. Voiding without difficulty.Possible DC home today.

## 2022-11-01 NOTE — PLAN OF CARE
Goal Outcome Evaluation:  Plan of Care Reviewed With: patient        Progress: improving  Outcome Evaluation: Pt ambulated 250 feet and ascended 3 steps/ descended 2 steps with supervision on RA- mild dyspnea and no desat under 92%. Pt tolerated L shoulder PROM , IR chest/ER 30. Family teaching set up for this time, however spouse reports he has been ill and needed to leave for MD appointment. Pt with good recall of teaching from initial eval and able to verbalize to OT correct sling application. She declined need for HH Kort visit. Recommend DC home with initial 24/7 family assist.

## 2022-11-01 NOTE — PROGRESS NOTES
Orthopedic Daily Progress Note    Subjective:  No acute events overnight. N/V well controlled. Pain is controlled this morning. Denies of fever, chills.       Physical Exam:  I have reviewed the vital signs.  Temp:  [96.5 °F (35.8 °C)-98.4 °F (36.9 °C)] 98 °F (36.7 °C)  Heart Rate:  [68-95] 68  Resp:  [13-16] 15  BP: ()/(46-77) 100/61    Objective  General Appearance:    Alert, cooperative, no distress  Extremities: No clubbing, cyanosis, or edema to lower extremities  Pulses:  2+ in distal surgical extremity  Skin: Dressing Clean/dry/intact      Results Review:    I have reviewed the labs, radiology results and diagnostic studies:Fracutre of the greater tuberosity noted on the post operative left shoulder XR.         Results from last 7 days   Lab Units 10/31/22  1721   POTASSIUM mmol/L 4.8       I have reviewed the medications.    Assessment/Problem List  1 Day Post-Op s/p Left anatomic TSA and bicep tenodesis    Plan  Miguel ANTHONY at all times x 6 weeks  Internal medicine perioperative medical evaluation much appreciated  Multimodal pain protocol  Daily PT/OT while inpatient  Follow-up in 1 week for incision check    Discharge Planning: Anticipate Discharge Today    Roopa Back MD  11/01/22  07:37 EDT

## 2022-11-01 NOTE — CASE MANAGEMENT/SOCIAL WORK
Case Management Discharge Note      Final Note: chart reviewed. I met with Mrs Bonds and spouse at bedside to discuss d/c plan. Her plan is to return home. She lives with spouse and daughter who can both provide assistance. OT evaluation noted. She uses no assistive equipment at home except CPAP. No equipment, Home Health or d/c needs identified         Selected Continued Care - Admitted Since 10/31/2022     Destination    No services have been selected for the patient.              Durable Medical Equipment    No services have been selected for the patient.              Dialysis/Infusion    No services have been selected for the patient.              Home Medical Care    No services have been selected for the patient.              Therapy    No services have been selected for the patient.              Community Resources    No services have been selected for the patient.              Community & DME    No services have been selected for the patient.                       Final Discharge Disposition Code: 01 - home or self-care

## 2022-11-01 NOTE — THERAPY TREATMENT NOTE
Patient Name: Ro Bonds  : 1955    MRN: [id number]                              Today's Date: 2022       Admit Date: 10/31/2022    Visit Dx: No diagnosis found.  Patient Active Problem List   Diagnosis   • Paroxysmal atrial flutter (HCC)   • Essential hypertension   • Hyperlipidemia LDL goal <100   • Type 2 diabetes mellitus without complication, without long-term current use of insulin (HCC)   • JOSEPH on CPAP   • Status post total shoulder arthroplasty, left   • Obesity   • Postoperative pain     Past Medical History:   Diagnosis Date   • Anxiety and depression    • Asthma    • Diabetes mellitus (HCC)    • Disease of thyroid gland    • Hyperlipidemia    • Hypertension    • Posttraumatic seroma (HCC)     after Abdominoplasty   • Sleep apnea     uses CPAP at night unless having trouble sleeping     Past Surgical History:   Procedure Laterality Date   • ABDOMINOPLASTY      with excesss skin removal   • BREAST EXCISIONAL BIOPSY Bilateral     SCARS GONE AFTER BREAST REDUCTION   • CARDIAC CATHETERIZATION      no interventions   •  SECTION      x2   • COLONOSCOPY     • EAR TUBES     • GASTRIC BYPASS     • HYSTERECTOMY      AGE 40 complete   • REDUCTION MAMMAPLASTY Bilateral    • TONSILLECTOMY     • TOTAL SHOULDER ARTHROPLASTY Left 10/31/2022    Procedure: TOTAL SHOULDER ARTHROPLASTY, BICEPS TENODESIS - LEFT;  Surgeon: Oneil Jones MD;  Location: [facility];  Service: Orthopedics;  Laterality: Left;      General Information     Row Name 22 1114          OT Time and Intention    Document Type therapy note (daily note)  -AR     Mode of Treatment individual therapy;occupational therapy  -AR     Row Name 22 1114          General Information    Existing Precautions/Restrictions fall;left;non-weight bearing;shoulder;other (see comments)  interscalene nerve catheter, Donjoy Ultra II sling with pillow  -AR     Row Name 22 1114          Cognition    Orientation Status  (Cognition) oriented x 4  -AR     Row Name 11/01/22 1114          Safety Issues, Functional Mobility    Impairments Affecting Function (Mobility) pain;motor planning;range of motion (ROM)  -AR           User Key  (r) = Recorded By, (t) = Taken By, (c) = Cosigned By    Initials Name Provider Type    Nani Goldstein OT Occupational Therapist                 Mobility/ADL's     Row Name 11/01/22 1115          Bed Mobility    Comment, (Bed Mobility) Pt received up in chair, reviewed safe sleeping position and importance of maintaining NWB LUE AAT  -AR     Row Name 11/01/22 1115          Transfers    Transfers sit-stand transfer;stand-sit transfer  -AR     Row Name 11/01/22 1115          Sit-Stand Transfer    Sit-Stand [place] (Transfers) supervision  -AR     Row Name 11/01/22 1115          Stand-Sit Transfer    Stand-Sit [place] (Transfers) supervision  -AR     Row Name 11/01/22 1115          Functional Mobility    Functional Mobility- Ind. Level supervision required  -AR     Functional Mobility-Distance (Feet) 250  -AR     Functional Mobility- Comment Pt ambulated 250 feet on RA and ascended 3 steps/descended 2 steps with supervision on RA. Mild dyspnea noted and no desaturarion under 92%.  -AR     Row Name 11/01/22 1115          Activities of Daily Living    BADL Assessment/Intervention bathing;upper body dressing;lower body dressing;feeding  -AR     Row Name 11/01/22 1115          Mobility    Extremity Weight-bearing Status left upper extremity  -AR     Left Upper Extremity (Weight-bearing Status) non weight-bearing (NWB)  -AR     Row Name 11/01/22 1115          Upper Body Dressing Assessment/Training    [place] Level (Upper Body Dressing) doff;front opening garment;moderate assist (50% patient effort);don;other (see comments)  max assist don sling  -AR     Position (Upper Body Dressing) supported sitting  -AR     Comment, (Upper Body Dressing) Educated pt on L shoulder precautions, ADL  retraining to maintain, sling management and care of interscalene nerve catheter during ADLs to avoid accidental dislodgement. Spouse not at bedside as he left for MD appointment, however pt doffed sling with supervision and donned with max assist. She was able to verablize to OT correct application and fit.  -AR     Row Name 11/01/22 1115          Bathing Assessment/Intervention    Comment, (Bathing) Reviewed L shoulder precautions, L axilla care to maintain and that pt may not showert until interscalene has been DC  -AR     Row Name 11/01/22 1115          Lower Body Dressing Assessment/Training    [place] Level (Lower Body Dressing) don;pants/bottoms;moderate assist (50% patient effort)  -AR     Position (Lower Body Dressing) unsupported sitting;unsupported standing  -AR     Row Name 11/01/22 1115          Self-Feeding Assessment/Training    [place] Level (Feeding) liquids to mouth;supervision  -AR     Position (Self-Feeding) supported sitting  -AR           User Key  (r) = Recorded By, (t) = Taken By, (c) = Cosigned By    Initials Name Provider Type    Nani Goldstein, OT Occupational Therapist               Obj/Interventions     Row Name 11/01/22 1121          Sensory Assessment (Somatosensory)    Sensory Assessment (Somatosensory) left UE  -AR     Sensory Subjective Reports numbness  -AR     Row Name 11/01/22 1121          Shoulder (Therapeutic Exercise)    Shoulder AROM (Therapeutic Exercise) bilateral;scapular retraction;sitting;10 repetitions  -AR     Shoulder PROM (Therapeutic Exercise) left;extension;flexion;external rotation;internal rotation;sitting;10 repetitions  -AR     Row Name 11/01/22 1121          Elbow/Forearm (Therapeutic Exercise)    Elbow/Forearm AAROM (Therapeutic Exercise) left;flexion;extension;supination;pronation;sitting;10 repetitions  -AR     Row Name 11/01/22 1121          Wrist (Therapeutic Exercise)    Wrist AROM (Therapeutic Exercise) left;extension;flexion;10  repetitions  -AR     Row Name 11/01/22 1121          Hand (Therapeutic Exercise)    Hand AROM/AAROM (Therapeutic Exercise) left;finger flexion;finger extension;AROM (active range of motion);10 repetitions  -AR     Row Name 11/01/22 1121          Motor Skills    Therapeutic Exercise shoulder;elbow/forearm;wrist;hand  -AR     Row Name 11/01/22 1121          Balance    Static Sitting Balance independent  -AR     Dynamic Sitting Balance independent  -AR     Position, Sitting Balance unsupported;sitting in chair  -AR     Static Standing Balance independent  -AR     Dynamic Standing Balance independent  -AR     Position/Device Used, Standing Balance unsupported  -AR           User Key  (r) = Recorded By, (t) = Taken By, (c) = Cosigned By    Initials Name Provider Type    Nani Goldstein OT Occupational Therapist               Goals/Plan     Row Name 11/01/22 1129          Transfer Goal 1 (OT)    Progress/Outcome (Transfer Goal 1, OT) goal partially met  -AR     Row Name 11/01/22 1129          Dressing Goal 1 (OT)    Progress/Outcome (Dressing Goal 1, OT) goal ongoing  -AR     Row Name 11/01/22 1129          ROM Goal 1 (OT)    Progress/Outcome (ROM Goal 1, OT) goal ongoing  -AR           User Key  (r) = Recorded By, (t) = Taken By, (c) = Cosigned By    Initials Name Provider Type    Nani Goldstein OT Occupational Therapist               Clinical Impression     Row Name 11/01/22 1122          Pain Assessment    Pretreatment Pain Rating 0/10 - no pain  -AR     Posttreatment Pain Rating 3/10  -AR     Pain Location - Side/Orientation Left  -AR     Pain Location - --  axilla  -AR     Pain Intervention(s) Medication (See MAR);Cold applied;Repositioned;Ambulation/increased activity;Nursing Notified  -AR     Row Name 11/01/22 1122          Plan of Care Review    Plan of Care Reviewed With patient  -AR     Progress improving  -AR     Outcome Evaluation Pt ambulated 250 feet and ascended 3 steps/ descended 2 steps  with supervision on RA- mild dyspnea and no desat under 92%. Pt tolerated L shoulder PROM , IR chest/ER 30. Family teaching set up for this time, however spouse reports he has been ill and needed to leave for MD appointment. Pt with good recall of teaching from initial eval and able to verbalize to OT correct sling application. She declined need for HH Kort visit. Recommend DC home with initial 24/7 family assist.  -AR     Row Name 11/01/22 1122          Therapy Plan Review/Discharge Plan (OT)    Anticipated Discharge Disposition (OT) home with 24/7 care  -AR     Row Name 11/01/22 1122          Vital Signs    Pre SpO2 (%) 95  -AR     O2 Delivery Pre Treatment room air  -AR     Intra SpO2 (%) 92  -AR     O2 Delivery Intra Treatment room air  -AR     Post SpO2 (%) 94  -AR     O2 Delivery Post Treatment room air  -AR     Pre Patient Position Sitting  -AR     Intra Patient Position Standing  -AR     Post Patient Position Sitting  -AR     Row Name 11/01/22 1122          Positioning and Restraints    Pre-Treatment Position sitting in chair/recliner  -AR     Post Treatment Position chair  -AR     In Chair reclined;call light within reach;encouraged to call for assist;exit alarm on;with nsg;with brace  -AR           User Key  (r) = Recorded By, (t) = Taken By, (c) = Cosigned By    Initials Name Provider Type    Nani Goldstein, OT Occupational Therapist               Outcome Measures     Row Name 11/01/22 1129          How much help from another is currently needed...    Putting on and taking off regular lower body clothing? 2  -AR     Bathing (including washing, rinsing, and drying) 2  -AR     Toileting (which includes using toilet bed pan or urinal) 3  -AR     Putting on and taking off regular upper body clothing 2  -AR     Taking care of personal grooming (such as brushing teeth) 3  -AR     Eating meals 3  -AR     AM-PAC 6 Clicks Score (OT) 15  -AR     Row Name 11/01/22 1129          Functional Assessment     Outcome Measure Options -PAC 6 Clicks Daily Activity (OT)  -AR           User Key  (r) = Recorded By, (t) = Taken By, (c) = Cosigned By    Initials Name Provider Type    Nani Goldstein OT Occupational Therapist                Occupational Therapy Education     Title: PT OT SLP Therapies (Done)     Topic: Occupational Therapy (Done)     Point: ADL training (Done)     Description:   Instruct learner(s) on proper safety adaptation and remediation techniques during self care or transfers.   Instruct in proper use of assistive devices.              Learning Progress Summary           Patient Eager, E,TB,D,H, DU,VU by AR at 11/1/2022 1130    Eager, E,TB,D,H, DU,VU by AR at 10/31/2022 1834   Family Eager, E,TB,D,H, DU,VU by AR at 10/31/2022 1834                   Point: Home exercise program (Done)     Description:   Instruct learner(s) on appropriate technique for monitoring, assisting and/or progressing therapeutic exercises/activities.              Learning Progress Summary           Patient Eager, E,TB,D,H, DU,VU by AR at 11/1/2022 1130    Eager, E,TB,D,H, DU,VU by AR at 10/31/2022 1834   Family Eager, E,TB,D,H, DU,VU by AR at 10/31/2022 1834                   Point: Precautions (Done)     Description:   Instruct learner(s) on prescribed precautions during self-care and functional transfers.              Learning Progress Summary           Patient Eager, E,TB,D,H, DU,VU by AR at 11/1/2022 1130    Eager, E,TB,D,H, DU,VU by AR at 10/31/2022 1834   Family Eager, E,TB,D,H, DU,VU by AR at 10/31/2022 1834                   Point: Body mechanics (Done)     Description:   Instruct learner(s) on proper positioning and spine alignment during self-care, functional mobility activities and/or exercises.              Learning Progress Summary           Patient Eager, E,TB,D,H, DU,VU by AR at 11/1/2022 1130    Eager, E,TB,D,H, DU,VU by AR at 10/31/2022 1834   Family Eager, E,TB,D,H, DU,WILL by AR at 10/31/2022 8393                                User Key     Initials Effective Dates Name Provider Type Discipline    AR 06/16/21 -  Nani Gordon OT Occupational Therapist OT              OT Recommendation and Plan  Planned Therapy Interventions (OT): BADL retraining, edema control/reduction, IADL retraining, occupation/activity based interventions, orthotic fabrication/fitting/training, patient/caregiver education/training, ROM/therapeutic exercise, passive ROM/stretching, transfer/mobility retraining  Therapy Frequency (OT): daily  Plan of Care Review  Plan of Care Reviewed With: patient  Progress: improving  Outcome Evaluation: Pt ambulated 250 feet and ascended 3 steps/ descended 2 steps with supervision on RA- mild dyspnea and no desat under 92%. Pt tolerated L shoulder PROM , IR chest/ER 30. Family teaching set up for this time, however spouse reports he has been ill and needed to leave for MD appointment. Pt with good recall of teaching from initial eval and able to verbalize to OT correct sling application. She declined need for HH Kort visit. Recommend DC home with initial 24/7 family assist.     Time Calculation:    Time Calculation- OT     Row Name 11/01/22 1130             Time Calculation- OT    OT Start Time 1002  -AR      OT Received On 11/01/22  -AR      OT Goal Re-Cert Due Date 11/10/22  -AR         Timed Charges    67360 - OT Therapeutic Exercise Minutes 17  -AR      86347 - OT Therapeutic Activity Minutes 11  -AR      34472 - OT Self Care/Mgmt Minutes 44  -AR         Total Minutes    Timed Charges Total Minutes 72  -AR       Total Minutes 72  -AR            User Key  (r) = Recorded By, (t) = Taken By, (c) = Cosigned By    Initials Name Provider Type    AR Nani Gordon OT Occupational Therapist              Therapy Charges for Today     Code Description Service Date Service Provider Modifiers Qty    76492517549  OT SELF CARE/MGMT/TRAIN EA 15 MIN 10/31/2022 Nani Gordon OT GO 1    09059981060  HC OT THERAPEUTIC ACT EA 15 MIN 10/31/2022 Nani Gordon, OT GO 1    64036915607 HC OT THER PROC EA 15 MIN 10/31/2022 Nani Gordon OT GO 1    91743992514 HC OT EVAL LOW COMPLEXITY 3 10/31/2022 Nani Gordon, OT GO 1    56288717166 HC OT SELF CARE/MGMT/TRAIN EA 15 MIN 11/1/2022 Nain Gordon OT GO 3    81331267650 HC OT THERAPEUTIC ACT EA 15 MIN 11/1/2022 Nani Gordon, OT GO 1    38337725569 HC OT THER PROC EA 15 MIN 11/1/2022 Nani Gordon, OT GO 1               Nani Gordon OT  11/1/2022

## 2022-11-01 NOTE — DISCHARGE INSTRUCTIONS
InfuBLOCK - Patient Information    What is a pain pump?  InfuBLOCK is a postoperative, non-narcotic pain relief system that delivers local anesthetic to or near the surgical site. This is a pain minimizing therapy that delivers an anesthetic (numbing) medicine to the nerve.    The InfuBLOCK pain pump will continuously deliver a local anesthetic medication to block the pain in the area of your procedure.    Where can I find information about my pain pump?           For more information about your pain pump, scan the QR code.  For additional patient resources, visit [email or web address].                                                                                             The [employer] Nursing Hotline is Here for You 24/7.     Call [phone] for Assistance.  While your physician is your primary source for information about your treatment., there may be times during your treatment that you need assistance with your infusion pump. Our team of compassionate and knowledgeable Registered Nursed (RN) is here to assist every step of the way.    Answers to questions about your infusion pump                 Tubing disconnect  Assistance with pump alarms                                                      Dislodged catheter  Excessive leakage noted from pump                                         Inadequate pain control             Nerve Catheter Removal Instructions  When your device is empty:    Remove your catheter by pulling the dressing off slowly (like you would remove a regular bandage). The catheter should pull right out of the skin.  Check that the BLUE tip is intact.                                                                                     If the catheter is stuck, reposition your   extremity and pull slowly until removed.  *If catheter is HURTING and WON'T come out, stop and call [phone] for further assistance.    Remove medication bag from the black  carrying case.  Cut the tubing on right and left side of pump, and discard the medication bag and tubing into garbage.  Place the pump and black carrying case into the plastic bag and then place this into the return box.  Seal box with blue stickers and return to [employer] postal service.    THIS IS PRE-PAID POSTAGE.           SMI COLD THERAPY - PATIENT INSTRUCTION SHEET    Cold Compression Therapy for your comfort and rehabilitation  Your caregivers want you to be productive in your rehab and comfortable during your stay. In keeping with those goals, you will be receiving an SMI Cold Therapy Wrap to help ease post-operative pain and swelling that might keep you from getting back on track! Your SMI Cold Therapy Wrap is effective and simple-to-use, and you will be encouraged to apply it throughout your hospital stay and at home through the duration of your recovery.    When you are ready to go home  Be sure to take your SMI Cold Therapy Wrap and both sets of Gel Bags with you for continued comfort and use throughout your rehabilitation. If you don't already have them, ask your nurse or aide to retrieve your SMI Gel Bags from the patient freezer.    Home use precautions  Always follow your medical professional's application instructions upon discharge. Your SMI Cold Therapy Wrap and Gel Bags are designed to last for months following your surgery. Never heat the Gel Bags unless specified by your healthcare provider. Supervision is advised when using this product on children or geriatric patients. To avoid danger of suffocation, please keep the outer plastic packaging away from children & pets.    Cold Therapy Instructions  Place Gel Bags in a freezer set ¾ of the way to max temperature for at least (4) hours. For best results, lay the Gel Bags flat and [id number] in the freezer. Once frozen, slide Gel Bags into the gel pouch and secure your wrap to the affected area with the straps.  Gel wraps that have been stored in a  freezer for an extended period of time may require a (10) minute period of softening up in a room temperature environment before application.  The gel pouch acts as a protective barrier. NEVER place frozen bags directly onto skin, as this may cause frostbite injury.  The [facility] Cold Therapy Wrap is designed to be able to be worm while ambulating. The compression straps can be secured well enough so that the Wrap won't fall off while moving.  Wrap Application Videos can be viewed at [email or web address].  An additional protective barrier such as clothing, a washcloth, hand-towel or pillowcase may be used during prolonged treatment applications.  The Gel-Pouch and Wrap are both Latex-Free and the Gel Bag ingredients are non toxic.    [facility] Wrap care instructions  The [facility] Cold Therapy Wrap may be hand washed and hung to dry when needed.    [facility] re-order information  Additional [facility] body specific wraps and/or Gel Bags can be re-ordered from [email or web address] or call [email or web address] ([phone])

## 2022-11-01 NOTE — PROGRESS NOTES
[facility]    Acute pain service Inpatient Progress Note    Patient Name: Ro Bonds  :  1955  MRN:  [id number]        Acute Pain  Service Inpatient Progress Note:    Analgesia:Good  Pain Score:6/10  LOC: alert and awake  Resp Status: room air  Cardiac: VS stable  Side Effects:None  Catheter Site:clean, dressing intact and dry  Cath type: peripheral nerve cath with ON Q  Volume: 1mL,5ml, 5ml InfuSystem Pump.  Catheter Plan:Catheter to remain Insitu and Continue catheter infusion rate unchanged  Comments: The neuro assessment of the operative extremity includes the ability to do finger flexion and extension; includes the ability to do wrist flexion and extension, includes the ability to do elbow flexion and extension.  The neuro exam of the patient includes sensory function throughout the operative extremity.      Pt having AXILLARY pain. Discussed option for a PECS block and the patient that she would be fine without it

## 2022-11-28 ENCOUNTER — HOSPITAL ENCOUNTER (OUTPATIENT)
Facility: HOSPITAL | Age: 67
Discharge: HOME | End: 2022-11-29
Attending: ORTHOPAEDIC SURGERY | Admitting: ORTHOPAEDIC SURGERY

## 2022-11-28 ENCOUNTER — ANESTHESIA EVENT CONVERTED (OUTPATIENT)
Dept: ANESTHESIOLOGY | Facility: HOSPITAL | Age: 67
End: 2022-11-28

## 2022-11-28 ENCOUNTER — ANESTHESIA (OUTPATIENT)
Dept: PERIOP | Facility: HOSPITAL | Age: 67
End: 2022-11-28

## 2022-11-28 ENCOUNTER — ANESTHESIA EVENT (OUTPATIENT)
Dept: PERIOP | Facility: HOSPITAL | Age: 67
End: 2022-11-28

## 2022-11-28 DIAGNOSIS — M25.512 LEFT SHOULDER PAIN: ICD-10-CM

## 2022-11-28 PROBLEM — F41.9 ANXIETY AND DEPRESSION: Status: ACTIVE | Noted: 2022-11-28

## 2022-11-28 PROBLEM — T81.31XA WOUND DEHISCENCE, SURGICAL: Status: ACTIVE | Noted: 2022-11-28

## 2022-11-28 PROBLEM — F32.A ANXIETY AND DEPRESSION: Status: ACTIVE | Noted: 2022-11-28

## 2022-11-28 LAB
ANION GAP SERPL CALCULATED.3IONS-SCNC: 13 MMOL/L (ref 5–15)
BUN SERPL-MCNC: 15 MG/DL (ref 8–23)
BUN/CREAT SERPL: 20.8 (ref 7–25)
CALCIUM SPEC-SCNC: 9.3 MG/DL (ref 8.6–10.5)
CHLORIDE SERPL-SCNC: 101 MMOL/L (ref 98–107)
CO2 SERPL-SCNC: 26 MMOL/L (ref 22–29)
CREAT SERPL-MCNC: 0.72 MG/DL (ref 0.57–1)
DEPRECATED RDW RBC AUTO: 41.1 FL (ref 37–54)
EGFRCR SERPLBLD CKD-EPI 2021: 90.6 ML/MIN/1.73
ERYTHROCYTE [DISTWIDTH] IN BLOOD BY AUTOMATED COUNT: 12.5 % (ref 12.3–15.4)
GLUCOSE BLDC GLUCOMTR-MCNC: 135 MG/DL (ref 70–130)
GLUCOSE BLDC GLUCOMTR-MCNC: 184 MG/DL (ref 70–130)
GLUCOSE SERPL-MCNC: 133 MG/DL (ref 65–99)
HCT VFR BLD AUTO: 44.8 % (ref 34–46.6)
HGB BLD-MCNC: 15.1 G/DL (ref 12–15.9)
MCH RBC QN AUTO: 30.1 PG (ref 26.6–33)
MCHC RBC AUTO-ENTMCNC: 33.7 G/DL (ref 31.5–35.7)
MCV RBC AUTO: 89.4 FL (ref 79–97)
PLATELET # BLD AUTO: 308 10*3/MM3 (ref 140–450)
PMV BLD AUTO: 9.5 FL (ref 6–12)
POTASSIUM SERPL-SCNC: 3.6 MMOL/L (ref 3.5–5.2)
RBC # BLD AUTO: 5.01 10*6/MM3 (ref 3.77–5.28)
SODIUM SERPL-SCNC: 140 MMOL/L (ref 136–145)
WBC NRBC COR # BLD: 8.9 10*3/MM3 (ref 3.4–10.8)

## 2022-11-28 PROCEDURE — 87102 FUNGUS ISOLATION CULTURE: CPT | Performed by: ORTHOPAEDIC SURGERY

## 2022-11-28 PROCEDURE — 25010000002 FENTANYL CITRATE (PF) 50 MCG/ML SOLUTION

## 2022-11-28 PROCEDURE — 25010000002 ONDANSETRON PER 1 MG: Performed by: ANESTHESIOLOGY

## 2022-11-28 PROCEDURE — A9270 NON-COVERED ITEM OR SERVICE: HCPCS | Performed by: ORTHOPAEDIC SURGERY

## 2022-11-28 PROCEDURE — 25010000002 PROPOFOL 10 MG/ML EMULSION: Performed by: ANESTHESIOLOGY

## 2022-11-28 PROCEDURE — 87077 CULTURE AEROBIC IDENTIFY: CPT | Performed by: ORTHOPAEDIC SURGERY

## 2022-11-28 PROCEDURE — 25010000002 HYDROMORPHONE 1 MG/ML SOLUTION

## 2022-11-28 PROCEDURE — 85027 COMPLETE CBC AUTOMATED: CPT | Performed by: ORTHOPAEDIC SURGERY

## 2022-11-28 PROCEDURE — 87070 CULTURE OTHR SPECIMN AEROBIC: CPT | Performed by: ORTHOPAEDIC SURGERY

## 2022-11-28 PROCEDURE — 82962 GLUCOSE BLOOD TEST: CPT

## 2022-11-28 PROCEDURE — 25010000002 DEXAMETHASONE PER 1 MG: Performed by: ANESTHESIOLOGY

## 2022-11-28 PROCEDURE — 63710000001 POVIDONE-IODINE 10 % SOLUTION 30 ML BOTTLE: Performed by: ORTHOPAEDIC SURGERY

## 2022-11-28 PROCEDURE — 87116 MYCOBACTERIA CULTURE: CPT | Performed by: ORTHOPAEDIC SURGERY

## 2022-11-28 PROCEDURE — 25010000002 CEFAZOLIN IN DEXTROSE 2-4 GM/100ML-% SOLUTION: Performed by: ORTHOPAEDIC SURGERY

## 2022-11-28 PROCEDURE — 87176 TISSUE HOMOGENIZATION CULTR: CPT | Performed by: ORTHOPAEDIC SURGERY

## 2022-11-28 PROCEDURE — 87186 SC STD MICRODIL/AGAR DIL: CPT | Performed by: ORTHOPAEDIC SURGERY

## 2022-11-28 PROCEDURE — 87075 CULTR BACTERIA EXCEPT BLOOD: CPT | Performed by: ORTHOPAEDIC SURGERY

## 2022-11-28 PROCEDURE — 25010000002 VANCOMYCIN 1 G RECONSTITUTED SOLUTION: Performed by: ORTHOPAEDIC SURGERY

## 2022-11-28 PROCEDURE — 25010000002 FENTANYL CITRATE (PF) 50 MCG/ML SOLUTION: Performed by: ANESTHESIOLOGY

## 2022-11-28 PROCEDURE — 25010000002 DEXAMETHASONE SODIUM PHOSPHATE 10 MG/ML SOLUTION: Performed by: NURSE ANESTHETIST, CERTIFIED REGISTERED

## 2022-11-28 PROCEDURE — 87205 SMEAR GRAM STAIN: CPT | Performed by: ORTHOPAEDIC SURGERY

## 2022-11-28 PROCEDURE — 80048 BASIC METABOLIC PNL TOTAL CA: CPT | Performed by: ORTHOPAEDIC SURGERY

## 2022-11-28 PROCEDURE — 87206 SMEAR FLUORESCENT/ACID STAI: CPT | Performed by: ORTHOPAEDIC SURGERY

## 2022-11-28 PROCEDURE — 87015 SPECIMEN INFECT AGNT CONCNTJ: CPT | Performed by: ORTHOPAEDIC SURGERY

## 2022-11-28 PROCEDURE — 25010000002 VANCOMYCIN: Performed by: ANESTHESIOLOGY

## 2022-11-28 RX ORDER — INSULIN LISPRO 100 [IU]/ML
0-7 INJECTION, SOLUTION INTRAVENOUS; SUBCUTANEOUS
Status: DISCONTINUED | OUTPATIENT
Start: 2022-11-29 | End: 2022-11-29 | Stop reason: HOSPADM

## 2022-11-28 RX ORDER — DEXTROSE MONOHYDRATE 25 G/50ML
25 INJECTION, SOLUTION INTRAVENOUS
Status: DISCONTINUED | OUTPATIENT
Start: 2022-11-28 | End: 2022-11-29 | Stop reason: HOSPADM

## 2022-11-28 RX ORDER — LEVOTHYROXINE SODIUM 88 UG/1
88 TABLET ORAL
Status: DISCONTINUED | OUTPATIENT
Start: 2022-11-29 | End: 2022-11-29 | Stop reason: HOSPADM

## 2022-11-28 RX ORDER — SODIUM CHLORIDE 0.9 % (FLUSH) 0.9 %
10 SYRINGE (ML) INJECTION AS NEEDED
Status: DISCONTINUED | OUTPATIENT
Start: 2022-11-28 | End: 2022-11-28 | Stop reason: HOSPADM

## 2022-11-28 RX ORDER — ALBUTEROL SULFATE 2.5 MG/3ML
2.5 SOLUTION RESPIRATORY (INHALATION) EVERY 6 HOURS PRN
Status: DISCONTINUED | OUTPATIENT
Start: 2022-11-28 | End: 2022-11-29 | Stop reason: HOSPADM

## 2022-11-28 RX ORDER — ACETAMINOPHEN 325 MG/1
650 TABLET ORAL EVERY 4 HOURS PRN
Status: DISCONTINUED | OUTPATIENT
Start: 2022-11-28 | End: 2022-11-29 | Stop reason: HOSPADM

## 2022-11-28 RX ORDER — FAMOTIDINE 20 MG/1
20 TABLET, FILM COATED ORAL
Status: COMPLETED | OUTPATIENT
Start: 2022-11-28 | End: 2022-11-28

## 2022-11-28 RX ORDER — LIDOCAINE HYDROCHLORIDE 10 MG/ML
INJECTION, SOLUTION EPIDURAL; INFILTRATION; INTRACAUDAL; PERINEURAL AS NEEDED
Status: DISCONTINUED | OUTPATIENT
Start: 2022-11-28 | End: 2022-11-28 | Stop reason: SURG

## 2022-11-28 RX ORDER — SODIUM CHLORIDE, SODIUM LACTATE, POTASSIUM CHLORIDE, CALCIUM CHLORIDE 600; 310; 30; 20 MG/100ML; MG/100ML; MG/100ML; MG/100ML
9 INJECTION, SOLUTION INTRAVENOUS CONTINUOUS PRN
Status: DISCONTINUED | OUTPATIENT
Start: 2022-11-28 | End: 2022-11-28 | Stop reason: HOSPADM

## 2022-11-28 RX ORDER — VANCOMYCIN HYDROCHLORIDE 1 G/20ML
INJECTION, POWDER, LYOPHILIZED, FOR SOLUTION INTRAVENOUS AS NEEDED
Status: DISCONTINUED | OUTPATIENT
Start: 2022-11-28 | End: 2022-11-28 | Stop reason: HOSPADM

## 2022-11-28 RX ORDER — LIDOCAINE HYDROCHLORIDE 10 MG/ML
0.5 INJECTION, SOLUTION EPIDURAL; INFILTRATION; INTRACAUDAL; PERINEURAL ONCE AS NEEDED
Status: COMPLETED | OUTPATIENT
Start: 2022-11-28 | End: 2022-11-28

## 2022-11-28 RX ORDER — ONDANSETRON 4 MG/1
4 TABLET, FILM COATED ORAL EVERY 6 HOURS PRN
Status: DISCONTINUED | OUTPATIENT
Start: 2022-11-28 | End: 2022-11-29 | Stop reason: HOSPADM

## 2022-11-28 RX ORDER — NALOXONE HCL 0.4 MG/ML
0.1 VIAL (ML) INJECTION
Status: DISCONTINUED | OUTPATIENT
Start: 2022-11-28 | End: 2022-11-29 | Stop reason: HOSPADM

## 2022-11-28 RX ORDER — MAGNESIUM HYDROXIDE 1200 MG/15ML
LIQUID ORAL AS NEEDED
Status: DISCONTINUED | OUTPATIENT
Start: 2022-11-28 | End: 2022-11-28 | Stop reason: HOSPADM

## 2022-11-28 RX ORDER — HYDROMORPHONE HYDROCHLORIDE 1 MG/ML
0.5 INJECTION, SOLUTION INTRAMUSCULAR; INTRAVENOUS; SUBCUTANEOUS
Status: DISCONTINUED | OUTPATIENT
Start: 2022-11-28 | End: 2022-11-29 | Stop reason: HOSPADM

## 2022-11-28 RX ORDER — DOXYCYCLINE HYCLATE 100 MG/1
100 CAPSULE ORAL 2 TIMES DAILY
Status: ON HOLD | COMMUNITY
End: 2022-11-29 | Stop reason: SDUPTHER

## 2022-11-28 RX ORDER — BUPIVACAINE HYDROCHLORIDE 2.5 MG/ML
INJECTION, SOLUTION EPIDURAL; INFILTRATION; INTRACAUDAL
Status: COMPLETED | OUTPATIENT
Start: 2022-11-28 | End: 2022-11-28

## 2022-11-28 RX ORDER — DEXAMETHASONE SODIUM PHOSPHATE 4 MG/ML
INJECTION, SOLUTION INTRA-ARTICULAR; INTRALESIONAL; INTRAMUSCULAR; INTRAVENOUS; SOFT TISSUE AS NEEDED
Status: DISCONTINUED | OUTPATIENT
Start: 2022-11-28 | End: 2022-11-28 | Stop reason: SURG

## 2022-11-28 RX ORDER — ATORVASTATIN CALCIUM 20 MG/1
20 TABLET, FILM COATED ORAL DAILY
Status: DISCONTINUED | OUTPATIENT
Start: 2022-11-29 | End: 2022-11-29 | Stop reason: HOSPADM

## 2022-11-28 RX ORDER — LABETALOL HYDROCHLORIDE 5 MG/ML
10 INJECTION, SOLUTION INTRAVENOUS EVERY 4 HOURS PRN
Status: DISCONTINUED | OUTPATIENT
Start: 2022-11-28 | End: 2022-11-29 | Stop reason: HOSPADM

## 2022-11-28 RX ORDER — ROCURONIUM BROMIDE 10 MG/ML
INJECTION, SOLUTION INTRAVENOUS AS NEEDED
Status: DISCONTINUED | OUTPATIENT
Start: 2022-11-28 | End: 2022-11-28 | Stop reason: SURG

## 2022-11-28 RX ORDER — MIDAZOLAM HYDROCHLORIDE 1 MG/ML
0.5 INJECTION INTRAMUSCULAR; INTRAVENOUS
Status: DISCONTINUED | OUTPATIENT
Start: 2022-11-28 | End: 2022-11-28 | Stop reason: HOSPADM

## 2022-11-28 RX ORDER — ESMOLOL HYDROCHLORIDE 10 MG/ML
INJECTION INTRAVENOUS AS NEEDED
Status: DISCONTINUED | OUTPATIENT
Start: 2022-11-28 | End: 2022-11-28 | Stop reason: SURG

## 2022-11-28 RX ORDER — ONDANSETRON 2 MG/ML
INJECTION INTRAMUSCULAR; INTRAVENOUS AS NEEDED
Status: DISCONTINUED | OUTPATIENT
Start: 2022-11-28 | End: 2022-11-28 | Stop reason: SURG

## 2022-11-28 RX ORDER — CEFAZOLIN SODIUM 2 G/100ML
2 INJECTION, SOLUTION INTRAVENOUS ONCE
Status: COMPLETED | OUTPATIENT
Start: 2022-11-28 | End: 2022-11-28

## 2022-11-28 RX ORDER — FLUOXETINE HYDROCHLORIDE 20 MG/1
40 CAPSULE ORAL DAILY
Status: DISCONTINUED | OUTPATIENT
Start: 2022-11-29 | End: 2022-11-29 | Stop reason: HOSPADM

## 2022-11-28 RX ORDER — FUROSEMIDE 40 MG/1
40 TABLET ORAL DAILY
Status: DISCONTINUED | OUTPATIENT
Start: 2022-11-29 | End: 2022-11-29 | Stop reason: HOSPADM

## 2022-11-28 RX ORDER — FENTANYL CITRATE 50 UG/ML
INJECTION, SOLUTION INTRAMUSCULAR; INTRAVENOUS AS NEEDED
Status: DISCONTINUED | OUTPATIENT
Start: 2022-11-28 | End: 2022-11-28 | Stop reason: SURG

## 2022-11-28 RX ORDER — TRAZODONE HYDROCHLORIDE 50 MG/1
50 TABLET ORAL NIGHTLY
Status: DISCONTINUED | OUTPATIENT
Start: 2022-11-28 | End: 2022-11-29 | Stop reason: HOSPADM

## 2022-11-28 RX ORDER — SODIUM CHLORIDE 450 MG/100ML
50 INJECTION, SOLUTION INTRAVENOUS CONTINUOUS
Status: DISCONTINUED | OUTPATIENT
Start: 2022-11-28 | End: 2022-11-29 | Stop reason: HOSPADM

## 2022-11-28 RX ORDER — HYDROMORPHONE HYDROCHLORIDE 1 MG/ML
0.5 INJECTION, SOLUTION INTRAMUSCULAR; INTRAVENOUS; SUBCUTANEOUS
Status: DISCONTINUED | OUTPATIENT
Start: 2022-11-28 | End: 2022-11-28 | Stop reason: HOSPADM

## 2022-11-28 RX ORDER — SPIRONOLACTONE 25 MG/1
25 TABLET ORAL DAILY
Status: DISCONTINUED | OUTPATIENT
Start: 2022-11-29 | End: 2022-11-29 | Stop reason: HOSPADM

## 2022-11-28 RX ORDER — ONDANSETRON 2 MG/ML
4 INJECTION INTRAMUSCULAR; INTRAVENOUS EVERY 6 HOURS PRN
Status: DISCONTINUED | OUTPATIENT
Start: 2022-11-28 | End: 2022-11-29 | Stop reason: HOSPADM

## 2022-11-28 RX ORDER — VANCOMYCIN HYDROCHLORIDE 1 G/20ML
INJECTION, POWDER, LYOPHILIZED, FOR SOLUTION INTRAVENOUS AS NEEDED
Status: DISCONTINUED | OUTPATIENT
Start: 2022-11-28 | End: 2022-11-28 | Stop reason: SURG

## 2022-11-28 RX ORDER — PROPOFOL 10 MG/ML
VIAL (ML) INTRAVENOUS AS NEEDED
Status: DISCONTINUED | OUTPATIENT
Start: 2022-11-28 | End: 2022-11-28 | Stop reason: SURG

## 2022-11-28 RX ORDER — FENTANYL CITRATE 50 UG/ML
INJECTION, SOLUTION INTRAMUSCULAR; INTRAVENOUS
Status: COMPLETED
Start: 2022-11-28 | End: 2022-11-28

## 2022-11-28 RX ORDER — DEXAMETHASONE SODIUM PHOSPHATE 10 MG/ML
INJECTION, SOLUTION INTRAMUSCULAR; INTRAVENOUS
Status: COMPLETED | OUTPATIENT
Start: 2022-11-28 | End: 2022-11-28

## 2022-11-28 RX ORDER — FENTANYL CITRATE 50 UG/ML
50 INJECTION, SOLUTION INTRAMUSCULAR; INTRAVENOUS
Status: DISCONTINUED | OUTPATIENT
Start: 2022-11-28 | End: 2022-11-28 | Stop reason: HOSPADM

## 2022-11-28 RX ORDER — BUPROPION HYDROCHLORIDE 150 MG/1
300 TABLET ORAL DAILY
Status: DISCONTINUED | OUTPATIENT
Start: 2022-11-29 | End: 2022-11-29 | Stop reason: HOSPADM

## 2022-11-28 RX ORDER — SODIUM CHLORIDE 9 MG/ML
40 INJECTION, SOLUTION INTRAVENOUS AS NEEDED
Status: DISCONTINUED | OUTPATIENT
Start: 2022-11-28 | End: 2022-11-28 | Stop reason: HOSPADM

## 2022-11-28 RX ORDER — ACETAMINOPHEN 650 MG/1
650 SUPPOSITORY RECTAL EVERY 4 HOURS PRN
Status: DISCONTINUED | OUTPATIENT
Start: 2022-11-28 | End: 2022-11-29 | Stop reason: HOSPADM

## 2022-11-28 RX ORDER — NICOTINE POLACRILEX 4 MG
15 LOZENGE BUCCAL
Status: DISCONTINUED | OUTPATIENT
Start: 2022-11-28 | End: 2022-11-29 | Stop reason: HOSPADM

## 2022-11-28 RX ORDER — OXYCODONE HYDROCHLORIDE 5 MG/1
5 TABLET ORAL EVERY 4 HOURS PRN
Status: DISCONTINUED | OUTPATIENT
Start: 2022-11-28 | End: 2022-11-29 | Stop reason: HOSPADM

## 2022-11-28 RX ORDER — SODIUM CHLORIDE 0.9 % (FLUSH) 0.9 %
10 SYRINGE (ML) INJECTION EVERY 12 HOURS SCHEDULED
Status: DISCONTINUED | OUTPATIENT
Start: 2022-11-28 | End: 2022-11-28 | Stop reason: HOSPADM

## 2022-11-28 RX ADMIN — LIDOCAINE HYDROCHLORIDE 0.5 ML: 10 INJECTION, SOLUTION EPIDURAL; INFILTRATION; INTRACAUDAL; PERINEURAL at 14:29

## 2022-11-28 RX ADMIN — SUGAMMADEX 200 MG: 100 INJECTION, SOLUTION INTRAVENOUS at 18:38

## 2022-11-28 RX ADMIN — FENTANYL CITRATE 100 MCG: 50 INJECTION, SOLUTION INTRAMUSCULAR; INTRAVENOUS at 17:55

## 2022-11-28 RX ADMIN — LIDOCAINE HYDROCHLORIDE 50 MG: 10 INJECTION, SOLUTION EPIDURAL; INFILTRATION; INTRACAUDAL; PERINEURAL at 17:57

## 2022-11-28 RX ADMIN — FAMOTIDINE 20 MG: 20 TABLET ORAL at 14:29

## 2022-11-28 RX ADMIN — ROCURONIUM BROMIDE 50 MG: 10 SOLUTION INTRAVENOUS at 17:57

## 2022-11-28 RX ADMIN — DEXAMETHASONE SODIUM PHOSPHATE 2 MG: 10 INJECTION, SOLUTION INTRAMUSCULAR; INTRAVENOUS at 15:54

## 2022-11-28 RX ADMIN — BUPIVACAINE HYDROCHLORIDE 15 ML: 2.5 INJECTION, SOLUTION EPIDURAL; INFILTRATION; INTRACAUDAL at 15:54

## 2022-11-28 RX ADMIN — VANCOMYCIN HYDROCHLORIDE 1250 G: 1 INJECTION, POWDER, LYOPHILIZED, FOR SOLUTION INTRAVENOUS at 18:11

## 2022-11-28 RX ADMIN — CEFAZOLIN SODIUM 2 G: 2 INJECTION, SOLUTION INTRAVENOUS at 17:56

## 2022-11-28 RX ADMIN — DEXAMETHASONE SODIUM PHOSPHATE 4 MG: 4 INJECTION, SOLUTION INTRAMUSCULAR; INTRAVENOUS at 18:04

## 2022-11-28 RX ADMIN — HYDROMORPHONE HYDROCHLORIDE 0.5 MG: 1 INJECTION, SOLUTION INTRAMUSCULAR; INTRAVENOUS; SUBCUTANEOUS at 18:54

## 2022-11-28 RX ADMIN — FENTANYL CITRATE 50 MCG: 50 INJECTION, SOLUTION INTRAMUSCULAR; INTRAVENOUS at 19:08

## 2022-11-28 RX ADMIN — ESMOLOL HYDROCHLORIDE 30 MG: 10 INJECTION, SOLUTION INTRAVENOUS at 18:02

## 2022-11-28 RX ADMIN — SODIUM CHLORIDE, POTASSIUM CHLORIDE, SODIUM LACTATE AND CALCIUM CHLORIDE 9 ML/HR: 600; 310; 30; 20 INJECTION, SOLUTION INTRAVENOUS at 14:29

## 2022-11-28 RX ADMIN — PROPOFOL 140 MG: 10 INJECTION, EMULSION INTRAVENOUS at 17:57

## 2022-11-28 RX ADMIN — ONDANSETRON 4 MG: 2 INJECTION INTRAMUSCULAR; INTRAVENOUS at 18:38

## 2022-11-28 NOTE — ANESTHESIA PREPROCEDURE EVALUATION
Anesthesia Evaluation     Patient summary reviewed and Nursing notes reviewed   NPO Solid Status: > 8 hours  NPO Liquid Status: > 8 hours           Airway   Mallampati: II  TM distance: >3 FB  Neck ROM: full  No difficulty expected  Dental      Pulmonary     breath sounds clear to auscultation  (+) asthma,sleep apnea,   Cardiovascular     ECG reviewed  Rhythm: regular  Rate: normal    (+) hypertension, dysrhythmias Atrial Flutter, hyperlipidemia,       Neuro/Psych  (+) psychiatric history Depression,    GI/Hepatic/Renal/Endo    (+) obesity,   diabetes mellitus (diet controlloed; no meds), thyroid problem     Musculoskeletal     Abdominal    Substance History      OB/GYN          Other        ROS/Med Hx Other: Blade: Graspr  Blade size: 3  ETT size (mm): 7.0  Cormack-Lehane Classification: grade I - full view of glottis    ? Estimated left ventricular EF = 56% Estimated left ventricular EF was in agreement with the calculated left ventricular EF. Left ventricular ejection fraction appears to be 56 - 60%. Left ventricular systolic function is normal.  ? Estimated right ventricular systolic pressure from tricuspid regurgitation is normal (<35 mmHg).  ? Left ventricular diastolic function is consistent with (grade I) impaired relaxation.  ? Normal right ventricular cavity size, wall thickness and septal motion noted with mildly reduced right ventricular systolic function.  ? No evidence of pulmonary hypertension is present.  ? There is no evidence of pericardial effusion.  ? No significant structural or functional valvular abnormalities demonstrated.                     Anesthesia Plan    ASA 3     general       Anesthetic plan, risks, benefits, and alternatives have been provided, discussed and informed consent has been obtained with: patient.    Plan discussed with CRNA.        CODE STATUS:

## 2022-11-28 NOTE — ANESTHESIA POSTPROCEDURE EVALUATION
Patient: Ro Bonds    Procedure Summary     Date: 11/28/22 Room / [facility]:  [facility]    Anesthesia Start: 1750 Anesthesia Stop: 1850    Procedure: INCISION AND DRAINAGE Left Shoulder (Left: Arm Upper) Diagnosis:     Surgeons: Oneil Jones MD Provider: Gio Clancy MD    Anesthesia Type: general ASA Status: 3          Anesthesia Type: general    Vitals  No vitals data found for the desired time range.          Post Anesthesia Care and Evaluation    Patient location during evaluation: PACU  Patient participation: complete - patient participated  Level of consciousness: awake and alert  Pain score: 4  Pain management: adequate    Airway patency: patent  Anesthetic complications: No anesthetic complications  PONV Status: noneRespiratory status: nasal cannula and spontaneous ventilation  Hydration status: acceptable

## 2022-11-28 NOTE — ANESTHESIA PROCEDURE NOTES
SS Interscalene      Patient reassessed immediately prior to procedure    Patient location during procedure: pre-op  Reason for block: at surgeon's request and post-op pain management  Performed by  CRNA/CAA: Meena Jimenez CRNA  Preanesthetic Checklist  Completed: patient identified, IV checked, site marked, risks and benefits discussed, surgical consent, monitors and equipment checked, pre-op evaluation and timeout performed  Prep:  Pt Position: right lateral decubitus  Sterile barriers:cap, gloves, mask and washed/disinfected hands  Prep: ChloraPrep  Patient monitoring: blood pressure monitoring, continuous pulse oximetry and EKG  Procedure    Sedation: yes  Performed under: local infiltration  Guidance:ultrasound guided    ULTRASOUND INTERPRETATION.  Using ultrasound guidance a 20 G gauge needle was placed in close proximity to the nerve, at which point, under ultrasound guidance anesthetic was injected in the area of the nerve and spread of the anesthesia was seen on ultrasound in close proximity thereto.  There were no abnormalities seen on ultrasound; a digital image was taken; and the patient tolerated the procedure with no complications. Images:still images obtained, printed/placed on chart    Laterality:left  Block Type:interscalene  Injection Technique:single-shot  Needle Type:short-bevel  Needle Gauge:20 G  Resistance on Injection: none  Catheter size: 20g.    Medications Used: dexamethasone sodium phosphate injection - Injection   2 mg - 11/28/2022 3:54:00 PM  bupivacaine PF (MARCAINE) 0.25 % injection - Injection   15 mL - 11/28/2022 3:54:00 PM      Post Assessment  Injection Assessment: negative aspiration for heme, no paresthesia on injection and incremental injection  Patient Tolerance:comfortable throughout block  Complications:no  Additional Notes  SINGLE shot   A high-frequency linear transducer, with sterile cover, was placed in the supraclavicular fossa to identify the subclavian artery and  "trunks and divisions of the brachial plexus. The transducer was then moved in a cephalad orientation with a slight rotation to continue visualization of the brachial plexus from the trunks and divisions, on to the C5-C7 roots. The insertion site was prepped and draped in sterile fashion. Skin and cutaneous tissue was infiltrated with 2-5 ml of 1% Lidocaine. Using ultrasound-guidance, a 20-gauge B-Tim 4\" Ultraplex 360 non-stimulating echogenic needle was advanced in plane from lateral to medial. Preservative-free normal saline was utilized for hydro-dissection of tissue, and to confirm final needle placement at the fascial plane between the middle scalene muscle and sheath of the brachial plexus (C5-C7). Local anesthetic in incremental 3-5 ml injections. Aspiration every 5 ml to prevent intravascular injection. Injection was completed with negative aspiration of blood and negative intravascular injection. Injection pressures were normal with minimal resistance.    CATHETER  A high-frequency linear transducer, with sterile cover, was placed in the supraclavicular fossa to identify the subclavian artery and trunks and divisions of the brachial plexus. The transducer was then moved in a cephalad orientation with a slight rotation to continue visualization of the brachial plexus from the trunks and divisions, on to the C5-C7 roots. The insertion site was prepped and draped in sterile fashion. Skin and cutaneous tissue was infiltrated with 2-5 ml of 1% Lidocaine. Using ultrasound-guidance, an 18-gauge Contiplex Ultra 360 Touhy needle was advanced in plane from lateral to medial. Preservative-free normal saline was utilized for hydro-dissection of tissue, advancement of Touhy, and to confirm final needle placement at the fascial plane between the middle scalene muscle and sheath of the brachial plexus (C5-C7). A 20-gauge Contiplex Echo catheter was placed through the needle and advance out the tip of the Touhy 3-5 cm " "with the \"Bright Flip\". The Touhy needle was then removed, and final catheter position verified lateral to the brachial plexus with local anesthetic (LA) and ultrasound visualization. The catheter was secured in the usual fashion with skin glue, benzoin, steri-strips, CHG tegaderm and label noting \"Nerve Block Catheter\". Jerk tape applied at yellow connector and catheter connection. All LA was injected in increments of 3-5 ml after catheter secured. Aspiration every 5 ml to prevent intravascular injection. Injection was completed with negative aspiration of blood and negative intravascular injection. Injection pressures were normal with minimal resistance.            "

## 2022-11-28 NOTE — ANESTHESIA PROCEDURE NOTES
Airway  Urgency: elective    Date/Time: 11/28/2022 5:59 PM  Airway not difficult    General Information and Staff    Patient location during procedure: OR    Indications and Patient Condition  Indications for airway management: airway protection    Preoxygenated: yes  MILS not maintained throughout  Mask difficulty assessment: 1 - vent by mask    Final Airway Details  Final airway type: endotracheal airway      Successful airway: ETT  Cuffed: yes   Successful intubation technique: direct laryngoscopy  Endotracheal tube insertion site: oral  Blade: Ami  Blade size: 3  ETT size (mm): 7.0  Cormack-Lehane Classification: grade I - full view of glottis  Placement verified by: chest auscultation and capnometry   Measured from: lips  ETT/EBT  to lips (cm): 20  Number of attempts at approach: 1  Assessment: lips, teeth, and gum same as pre-op and atraumatic intubation    Additional Comments  Negative epigastric sounds, Breath sound equal bilaterally with symmetric chest rise and fall

## 2022-11-29 VITALS
HEART RATE: 56 BPM | DIASTOLIC BLOOD PRESSURE: 57 MMHG | BODY MASS INDEX: 33.38 KG/M2 | WEIGHT: 170 LBS | RESPIRATION RATE: 16 BRPM | SYSTOLIC BLOOD PRESSURE: 90 MMHG | TEMPERATURE: 98.5 F | HEIGHT: 60 IN | OXYGEN SATURATION: 94 %

## 2022-11-29 PROBLEM — Z98.890 STATUS POST INCISION AND DRAINAGE: Status: ACTIVE | Noted: 2022-11-29

## 2022-11-29 LAB
ANION GAP SERPL CALCULATED.3IONS-SCNC: 14 MMOL/L (ref 5–15)
BASOPHILS # BLD AUTO: 0.01 10*3/MM3 (ref 0–0.2)
BASOPHILS NFR BLD AUTO: 0.1 % (ref 0–1.5)
BUN SERPL-MCNC: 16 MG/DL (ref 8–23)
BUN/CREAT SERPL: 23.9 (ref 7–25)
CALCIUM SPEC-SCNC: 9.4 MG/DL (ref 8.6–10.5)
CHLORIDE SERPL-SCNC: 103 MMOL/L (ref 98–107)
CO2 SERPL-SCNC: 20 MMOL/L (ref 22–29)
CREAT SERPL-MCNC: 0.67 MG/DL (ref 0.57–1)
DEPRECATED RDW RBC AUTO: 41.6 FL (ref 37–54)
EGFRCR SERPLBLD CKD-EPI 2021: 94.7 ML/MIN/1.73
EOSINOPHIL # BLD AUTO: 0 10*3/MM3 (ref 0–0.4)
EOSINOPHIL NFR BLD AUTO: 0 % (ref 0.3–6.2)
ERYTHROCYTE [DISTWIDTH] IN BLOOD BY AUTOMATED COUNT: 12.6 % (ref 12.3–15.4)
GLUCOSE BLDC GLUCOMTR-MCNC: 125 MG/DL (ref 70–130)
GLUCOSE BLDC GLUCOMTR-MCNC: 146 MG/DL (ref 70–130)
GLUCOSE SERPL-MCNC: 250 MG/DL (ref 65–99)
HCT VFR BLD AUTO: 45 % (ref 34–46.6)
HGB BLD-MCNC: 14.8 G/DL (ref 12–15.9)
IMM GRANULOCYTES # BLD AUTO: 0.05 10*3/MM3 (ref 0–0.05)
IMM GRANULOCYTES NFR BLD AUTO: 0.7 % (ref 0–0.5)
LYMPHOCYTES # BLD AUTO: 2.3 10*3/MM3 (ref 0.7–3.1)
LYMPHOCYTES NFR BLD AUTO: 30.8 % (ref 19.6–45.3)
MCH RBC QN AUTO: 29.9 PG (ref 26.6–33)
MCHC RBC AUTO-ENTMCNC: 32.9 G/DL (ref 31.5–35.7)
MCV RBC AUTO: 90.9 FL (ref 79–97)
MONOCYTES # BLD AUTO: 0.33 10*3/MM3 (ref 0.1–0.9)
MONOCYTES NFR BLD AUTO: 4.4 % (ref 5–12)
NEUTROPHILS NFR BLD AUTO: 4.78 10*3/MM3 (ref 1.7–7)
NEUTROPHILS NFR BLD AUTO: 64 % (ref 42.7–76)
NRBC BLD AUTO-RTO: 0 /100 WBC (ref 0–0.2)
PLATELET # BLD AUTO: 320 10*3/MM3 (ref 140–450)
PMV BLD AUTO: 9.6 FL (ref 6–12)
POTASSIUM SERPL-SCNC: 4 MMOL/L (ref 3.5–5.2)
RBC # BLD AUTO: 4.95 10*6/MM3 (ref 3.77–5.28)
SODIUM SERPL-SCNC: 137 MMOL/L (ref 136–145)
WBC NRBC COR # BLD: 7.47 10*3/MM3 (ref 3.4–10.8)

## 2022-11-29 PROCEDURE — A9270 NON-COVERED ITEM OR SERVICE: HCPCS | Performed by: ORTHOPAEDIC SURGERY

## 2022-11-29 PROCEDURE — 63710000001 SPIRONOLACTONE 25 MG TABLET: Performed by: INTERNAL MEDICINE

## 2022-11-29 PROCEDURE — 63710000001 LEVOTHYROXINE 88 MCG TABLET: Performed by: INTERNAL MEDICINE

## 2022-11-29 PROCEDURE — 82962 GLUCOSE BLOOD TEST: CPT

## 2022-11-29 PROCEDURE — 63710000001 ACETAMINOPHEN 325 MG TABLET: Performed by: ORTHOPAEDIC SURGERY

## 2022-11-29 PROCEDURE — 63710000001 FLUOXETINE 20 MG CAPSULE: Performed by: INTERNAL MEDICINE

## 2022-11-29 PROCEDURE — 63710000001 ATORVASTATIN 20 MG TABLET: Performed by: INTERNAL MEDICINE

## 2022-11-29 PROCEDURE — A9270 NON-COVERED ITEM OR SERVICE: HCPCS | Performed by: INTERNAL MEDICINE

## 2022-11-29 PROCEDURE — 63710000001 OXYCODONE 5 MG TABLET: Performed by: ORTHOPAEDIC SURGERY

## 2022-11-29 PROCEDURE — 63710000001 BUPROPION XL 150 MG TABLET SUSTAINED-RELEASE 24 HOUR: Performed by: INTERNAL MEDICINE

## 2022-11-29 PROCEDURE — 63710000001 FUROSEMIDE 40 MG TABLET: Performed by: INTERNAL MEDICINE

## 2022-11-29 PROCEDURE — 80048 BASIC METABOLIC PNL TOTAL CA: CPT | Performed by: ORTHOPAEDIC SURGERY

## 2022-11-29 PROCEDURE — 85025 COMPLETE CBC W/AUTO DIFF WBC: CPT | Performed by: ORTHOPAEDIC SURGERY

## 2022-11-29 PROCEDURE — 63710000001 METOPROLOL TARTRATE 25 MG TABLET: Performed by: INTERNAL MEDICINE

## 2022-11-29 PROCEDURE — 25010000002 VANCOMYCIN 10 G RECONSTITUTED SOLUTION: Performed by: ORTHOPAEDIC SURGERY

## 2022-11-29 PROCEDURE — 63710000001 TRAZODONE 50 MG TABLET: Performed by: INTERNAL MEDICINE

## 2022-11-29 RX ORDER — DOXYCYCLINE HYCLATE 100 MG/1
100 CAPSULE ORAL 2 TIMES DAILY
Qty: 20 CAPSULE | Refills: 0 | Status: SHIPPED | OUTPATIENT
Start: 2022-11-29

## 2022-11-29 RX ORDER — OXYCODONE HYDROCHLORIDE 5 MG/1
5 TABLET ORAL EVERY 4 HOURS PRN
Qty: 20 TABLET | Refills: 0 | Status: SHIPPED | OUTPATIENT
Start: 2022-11-29

## 2022-11-29 RX ADMIN — FUROSEMIDE 40 MG: 40 TABLET ORAL at 08:52

## 2022-11-29 RX ADMIN — FLUOXETINE 40 MG: 20 CAPSULE ORAL at 08:51

## 2022-11-29 RX ADMIN — VANCOMYCIN HYDROCHLORIDE 1250 MG: 10 INJECTION, POWDER, LYOPHILIZED, FOR SOLUTION INTRAVENOUS at 06:43

## 2022-11-29 RX ADMIN — OXYCODONE 5 MG: 5 TABLET ORAL at 00:04

## 2022-11-29 RX ADMIN — BUPROPION HYDROCHLORIDE 300 MG: 150 TABLET, FILM COATED, EXTENDED RELEASE ORAL at 08:51

## 2022-11-29 RX ADMIN — METOPROLOL TARTRATE 25 MG: 25 TABLET, FILM COATED ORAL at 00:04

## 2022-11-29 RX ADMIN — SPIRONOLACTONE 25 MG: 25 TABLET ORAL at 08:52

## 2022-11-29 RX ADMIN — ATORVASTATIN CALCIUM 20 MG: 20 TABLET, FILM COATED ORAL at 08:53

## 2022-11-29 RX ADMIN — METOPROLOL TARTRATE 25 MG: 25 TABLET, FILM COATED ORAL at 08:57

## 2022-11-29 RX ADMIN — LEVOTHYROXINE SODIUM 88 MCG: 88 TABLET ORAL at 06:46

## 2022-11-29 RX ADMIN — ACETAMINOPHEN 650 MG: 325 TABLET ORAL at 09:04

## 2022-11-29 RX ADMIN — TRAZODONE HYDROCHLORIDE 50 MG: 50 TABLET ORAL at 00:04

## 2022-12-02 LAB
BACTERIA SPEC AEROBE CULT: ABNORMAL
BACTERIA SPEC AEROBE CULT: ABNORMAL
GRAM STN SPEC: ABNORMAL

## 2022-12-08 LAB
BACTERIA SPEC ANAEROBE CULT: NORMAL

## 2022-12-09 ENCOUNTER — LAB (OUTPATIENT)
Dept: LAB | Facility: HOSPITAL | Age: 67
End: 2022-12-09

## 2022-12-09 ENCOUNTER — TRANSCRIBE ORDERS (OUTPATIENT)
Dept: LAB | Facility: HOSPITAL | Age: 67
End: 2022-12-09

## 2022-12-09 DIAGNOSIS — T84.59XA INFECTION OF PROSTHETIC KNEE JOINT, INITIAL ENCOUNTER: ICD-10-CM

## 2022-12-09 DIAGNOSIS — L03.114 CELLULITIS OF LEFT HAND EXCLUDING FINGERS AND THUMB: ICD-10-CM

## 2022-12-09 DIAGNOSIS — Z96.612 PRESENCE OF LEFT ARTIFICIAL SHOULDER JOINT: Primary | ICD-10-CM

## 2022-12-09 DIAGNOSIS — B95.4 BACTERIAL INFECTION DUE TO STREPTOCOCCUS, GROUP G: ICD-10-CM

## 2022-12-09 DIAGNOSIS — Z96.659 INFECTION OF PROSTHETIC KNEE JOINT, INITIAL ENCOUNTER: ICD-10-CM

## 2022-12-09 DIAGNOSIS — E11.69 TYPE 2 DIABETES MELLITUS WITH OTHER SPECIFIED COMPLICATION, UNSPECIFIED WHETHER LONG TERM INSULIN USE: ICD-10-CM

## 2022-12-09 DIAGNOSIS — Z96.612 PRESENCE OF LEFT ARTIFICIAL SHOULDER JOINT: ICD-10-CM

## 2022-12-09 LAB
ALBUMIN SERPL-MCNC: 4 G/DL (ref 3.5–5.2)
ALBUMIN/GLOB SERPL: 1.3 G/DL
ALP SERPL-CCNC: 75 U/L (ref 39–117)
ALT SERPL W P-5'-P-CCNC: 11 U/L (ref 1–33)
ANION GAP SERPL CALCULATED.3IONS-SCNC: 12 MMOL/L (ref 5–15)
AST SERPL-CCNC: 14 U/L (ref 1–32)
BACTERIA SPEC AEROBE CULT: ABNORMAL
BASOPHILS # BLD AUTO: 0.04 10*3/MM3 (ref 0–0.2)
BASOPHILS NFR BLD AUTO: 0.8 % (ref 0–1.5)
BILIRUB SERPL-MCNC: 1.1 MG/DL (ref 0–1.2)
BUN SERPL-MCNC: 12 MG/DL (ref 8–23)
BUN/CREAT SERPL: 14.5 (ref 7–25)
CALCIUM SPEC-SCNC: 9.2 MG/DL (ref 8.6–10.5)
CHLORIDE SERPL-SCNC: 104 MMOL/L (ref 98–107)
CK SERPL-CCNC: 28 U/L (ref 20–180)
CO2 SERPL-SCNC: 28 MMOL/L (ref 22–29)
CREAT SERPL-MCNC: 0.83 MG/DL (ref 0.57–1)
CRP SERPL-MCNC: <0.3 MG/DL (ref 0–0.5)
DEPRECATED RDW RBC AUTO: 43.3 FL (ref 37–54)
EGFRCR SERPLBLD CKD-EPI 2021: 76.4 ML/MIN/1.73
EOSINOPHIL # BLD AUTO: 0.24 10*3/MM3 (ref 0–0.4)
EOSINOPHIL NFR BLD AUTO: 4.6 % (ref 0.3–6.2)
ERYTHROCYTE [DISTWIDTH] IN BLOOD BY AUTOMATED COUNT: 12.7 % (ref 12.3–15.4)
ERYTHROCYTE [SEDIMENTATION RATE] IN BLOOD: 20 MM/HR (ref 0–30)
GLOBULIN UR ELPH-MCNC: 3 GM/DL
GLUCOSE SERPL-MCNC: 130 MG/DL (ref 65–99)
GRAM STN SPEC: ABNORMAL
GRAM STN SPEC: ABNORMAL
HCT VFR BLD AUTO: 44.7 % (ref 34–46.6)
HGB BLD-MCNC: 14.6 G/DL (ref 12–15.9)
IMM GRANULOCYTES # BLD AUTO: 0.02 10*3/MM3 (ref 0–0.05)
IMM GRANULOCYTES NFR BLD AUTO: 0.4 % (ref 0–0.5)
LYMPHOCYTES # BLD AUTO: 2.63 10*3/MM3 (ref 0.7–3.1)
LYMPHOCYTES NFR BLD AUTO: 50.6 % (ref 19.6–45.3)
MCH RBC QN AUTO: 30.1 PG (ref 26.6–33)
MCHC RBC AUTO-ENTMCNC: 32.7 G/DL (ref 31.5–35.7)
MCV RBC AUTO: 92.2 FL (ref 79–97)
MONOCYTES # BLD AUTO: 0.49 10*3/MM3 (ref 0.1–0.9)
MONOCYTES NFR BLD AUTO: 9.4 % (ref 5–12)
NEUTROPHILS NFR BLD AUTO: 1.78 10*3/MM3 (ref 1.7–7)
NEUTROPHILS NFR BLD AUTO: 34.2 % (ref 42.7–76)
NRBC BLD AUTO-RTO: 0 /100 WBC (ref 0–0.2)
PLATELET # BLD AUTO: 307 10*3/MM3 (ref 140–450)
PMV BLD AUTO: 9.9 FL (ref 6–12)
POTASSIUM SERPL-SCNC: 3.3 MMOL/L (ref 3.5–5.2)
PROT SERPL-MCNC: 7 G/DL (ref 6–8.5)
RBC # BLD AUTO: 4.85 10*6/MM3 (ref 3.77–5.28)
SODIUM SERPL-SCNC: 144 MMOL/L (ref 136–145)
WBC NRBC COR # BLD: 5.2 10*3/MM3 (ref 3.4–10.8)

## 2022-12-09 PROCEDURE — 36415 COLL VENOUS BLD VENIPUNCTURE: CPT

## 2022-12-09 PROCEDURE — 85025 COMPLETE CBC W/AUTO DIFF WBC: CPT

## 2022-12-09 PROCEDURE — 85652 RBC SED RATE AUTOMATED: CPT

## 2022-12-09 PROCEDURE — 86140 C-REACTIVE PROTEIN: CPT

## 2022-12-09 PROCEDURE — 82550 ASSAY OF CK (CPK): CPT

## 2022-12-09 PROCEDURE — 80053 COMPREHEN METABOLIC PANEL: CPT

## 2023-01-09 LAB
FUNGUS WND CULT: NORMAL
MYCOBACTERIUM SPEC CULT: NORMAL
NIGHT BLUE STAIN TISS: NORMAL

## 2023-03-17 ENCOUNTER — OFFICE (OUTPATIENT)
Dept: URBAN - METROPOLITAN AREA PATHOLOGY 4 | Facility: PATHOLOGY | Age: 68
End: 2023-03-17
Payer: MEDICARE

## 2023-03-17 ENCOUNTER — AMBULATORY SURGICAL CENTER (OUTPATIENT)
Dept: URBAN - METROPOLITAN AREA SURGERY 10 | Facility: SURGERY | Age: 68
End: 2023-03-17
Payer: MEDICARE

## 2023-03-17 DIAGNOSIS — K63.5 POLYP OF COLON: ICD-10-CM

## 2023-03-17 DIAGNOSIS — K64.1 SECOND DEGREE HEMORRHOIDS: ICD-10-CM

## 2023-03-17 DIAGNOSIS — Z86.010 PERSONAL HISTORY OF COLONIC POLYPS: ICD-10-CM

## 2023-03-17 PROCEDURE — 88305 TISSUE EXAM BY PATHOLOGIST: CPT | Performed by: INTERNAL MEDICINE

## 2023-03-17 PROCEDURE — 45385 COLONOSCOPY W/LESION REMOVAL: CPT | Mod: PT | Performed by: INTERNAL MEDICINE

## 2023-03-27 ENCOUNTER — TRANSCRIBE ORDERS (OUTPATIENT)
Dept: ADMINISTRATIVE | Facility: HOSPITAL | Age: 68
End: 2023-03-27
Payer: MEDICARE

## 2023-03-27 DIAGNOSIS — Z12.31 VISIT FOR SCREENING MAMMOGRAM: Primary | ICD-10-CM

## 2023-03-29 ENCOUNTER — OFFICE (OUTPATIENT)
Dept: URBAN - METROPOLITAN AREA PATHOLOGY 4 | Facility: PATHOLOGY | Age: 68
End: 2023-03-29
Payer: MEDICARE

## 2023-03-29 DIAGNOSIS — K21.00 GASTRO-ESOPHAGEAL REFLUX DISEASE WITH ESOPHAGITIS, WITHOUT B: ICD-10-CM

## 2023-03-29 PROCEDURE — 88305 TISSUE EXAM BY PATHOLOGIST: CPT | Performed by: INTERNAL MEDICINE

## 2023-03-30 ENCOUNTER — AMBULATORY SURGICAL CENTER (OUTPATIENT)
Dept: URBAN - METROPOLITAN AREA SURGERY 10 | Facility: SURGERY | Age: 68
End: 2023-03-30
Payer: MEDICARE

## 2023-03-30 DIAGNOSIS — K21.00 GASTRO-ESOPHAGEAL REFLUX DISEASE WITH ESOPHAGITIS, WITHOUT B: ICD-10-CM

## 2023-03-30 DIAGNOSIS — K30 FUNCTIONAL DYSPEPSIA: ICD-10-CM

## 2023-03-30 DIAGNOSIS — K29.70 GASTRITIS, UNSPECIFIED, WITHOUT BLEEDING: ICD-10-CM

## 2023-03-30 PROCEDURE — 43239 EGD BIOPSY SINGLE/MULTIPLE: CPT | Performed by: INTERNAL MEDICINE

## 2023-03-31 ENCOUNTER — HOSPITAL ENCOUNTER (OUTPATIENT)
Dept: MAMMOGRAPHY | Facility: HOSPITAL | Age: 68
Discharge: HOME | End: 2023-03-31
Admitting: NURSE PRACTITIONER
Payer: MEDICARE

## 2023-03-31 DIAGNOSIS — Z12.31 VISIT FOR SCREENING MAMMOGRAM: ICD-10-CM

## 2023-03-31 PROCEDURE — 77067 SCR MAMMO BI INCL CAD: CPT | Performed by: RADIOLOGY

## 2023-03-31 PROCEDURE — 77063 BREAST TOMOSYNTHESIS BI: CPT

## 2023-03-31 PROCEDURE — 77067 SCR MAMMO BI INCL CAD: CPT

## 2023-03-31 PROCEDURE — 77063 BREAST TOMOSYNTHESIS BI: CPT | Performed by: RADIOLOGY

## 2023-05-03 ENCOUNTER — HOSPITAL ENCOUNTER (OUTPATIENT)
Dept: MAMMOGRAPHY | Facility: HOSPITAL | Age: 68
Discharge: HOME | End: 2023-05-03
Payer: MEDICARE

## 2023-05-03 ENCOUNTER — HOSPITAL ENCOUNTER (OUTPATIENT)
Dept: ULTRASOUND IMAGING | Facility: HOSPITAL | Age: 68
Discharge: HOME | End: 2023-05-03
Payer: MEDICARE

## 2023-05-03 DIAGNOSIS — R92.8 ABNORMAL MAMMOGRAM: ICD-10-CM

## 2023-05-03 PROCEDURE — 77065 DX MAMMO INCL CAD UNI: CPT

## 2023-05-03 PROCEDURE — 0 LIDOCAINE 1 % SOLUTION: Performed by: RADIOLOGY

## 2023-05-03 PROCEDURE — 88305 TISSUE EXAM BY PATHOLOGIST: CPT | Performed by: NURSE PRACTITIONER

## 2023-05-03 PROCEDURE — A4648 IMPLANTABLE TISSUE MARKER: HCPCS

## 2023-05-03 PROCEDURE — G0279 TOMOSYNTHESIS, MAMMO: HCPCS

## 2023-05-03 PROCEDURE — 76642 ULTRASOUND BREAST LIMITED: CPT

## 2023-05-03 RX ORDER — LIDOCAINE HYDROCHLORIDE 10 MG/ML
5 INJECTION, SOLUTION INFILTRATION; PERINEURAL ONCE
Status: COMPLETED | OUTPATIENT
Start: 2023-05-03 | End: 2023-05-03

## 2023-05-03 RX ORDER — LIDOCAINE HYDROCHLORIDE AND EPINEPHRINE 10; 10 MG/ML; UG/ML
5 INJECTION, SOLUTION INFILTRATION; PERINEURAL ONCE
Status: COMPLETED | OUTPATIENT
Start: 2023-05-03 | End: 2023-05-03

## 2023-05-03 RX ADMIN — LIDOCAINE HYDROCHLORIDE,EPINEPHRINE BITARTRATE 1.5 ML: 10; .01 INJECTION, SOLUTION INFILTRATION; PERINEURAL at 14:08

## 2023-05-03 RX ADMIN — Medication 1.5 ML: at 14:08

## 2023-05-03 NOTE — PROGRESS NOTES
Alert and orientated. Denies discomfort, no active bleeding, steri-strips intact, gauze dressing applied. Cold packs given. Verbalizes and demonstrates understanding of post-care instructions, written copy given.

## 2023-05-04 LAB
CYTO UR: NORMAL
LAB AP CASE REPORT: NORMAL
LAB AP CLINICAL INFORMATION: NORMAL
PATH REPORT.FINAL DX SPEC: NORMAL
PATH REPORT.GROSS SPEC: NORMAL

## 2023-05-05 ENCOUNTER — TELEPHONE (OUTPATIENT)
Dept: MAMMOGRAPHY | Facility: HOSPITAL | Age: 68
End: 2023-05-05
Payer: MEDICARE

## 2023-05-05 NOTE — TELEPHONE ENCOUNTER
Patient notified of pathology results and recommendation. Verbalizes understanding. Denies discomfort. Denies signs and symptoms of infection. Questions answered, support given, verbalized understanding.

## 2023-09-05 ENCOUNTER — HOSPITAL ENCOUNTER (OUTPATIENT)
Dept: GENERAL RADIOLOGY | Facility: HOSPITAL | Age: 68
Discharge: HOME | End: 2023-09-05
Admitting: NURSE PRACTITIONER
Payer: MEDICARE

## 2023-09-05 ENCOUNTER — TRANSCRIBE ORDERS (OUTPATIENT)
Dept: ADMINISTRATIVE | Facility: HOSPITAL | Age: 68
End: 2023-09-05
Payer: MEDICARE

## 2023-09-05 DIAGNOSIS — M25.561 KNEE PAIN, RIGHT ANTERIOR: Primary | ICD-10-CM

## 2023-09-05 PROCEDURE — 73562 X-RAY EXAM OF KNEE 3: CPT

## 2024-02-16 ENCOUNTER — PRE-ADMISSION TESTING (OUTPATIENT)
Dept: PREADMISSION TESTING | Facility: HOSPITAL | Age: 69
End: 2024-02-16
Payer: MEDICARE

## 2024-02-16 VITALS — HEIGHT: 59 IN | WEIGHT: 189.93 LBS | BODY MASS INDEX: 38.29 KG/M2

## 2024-02-16 LAB
ANION GAP SERPL CALCULATED.3IONS-SCNC: 10 MMOL/L (ref 5–15)
BUN SERPL-MCNC: 14 MG/DL (ref 8–23)
BUN/CREAT SERPL: 17.9 (ref 7–25)
CALCIUM SPEC-SCNC: 8.7 MG/DL (ref 8.6–10.5)
CHLORIDE SERPL-SCNC: 106 MMOL/L (ref 98–107)
CO2 SERPL-SCNC: 27 MMOL/L (ref 22–29)
CREAT SERPL-MCNC: 0.78 MG/DL (ref 0.57–1)
DEPRECATED RDW RBC AUTO: 40.6 FL (ref 37–54)
EGFRCR SERPLBLD CKD-EPI 2021: 81.8 ML/MIN/1.73
ERYTHROCYTE [DISTWIDTH] IN BLOOD BY AUTOMATED COUNT: 13.1 % (ref 12.3–15.4)
GLUCOSE SERPL-MCNC: 132 MG/DL (ref 65–99)
HBA1C MFR BLD: 7 % (ref 4.8–5.6)
HCT VFR BLD AUTO: 41.6 % (ref 34–46.6)
HGB BLD-MCNC: 13.1 G/DL (ref 12–15.9)
MCH RBC QN AUTO: 26.7 PG (ref 26.6–33)
MCHC RBC AUTO-ENTMCNC: 31.5 G/DL (ref 31.5–35.7)
MCV RBC AUTO: 84.7 FL (ref 79–97)
PLATELET # BLD AUTO: 322 10*3/MM3 (ref 140–450)
PMV BLD AUTO: 9.8 FL (ref 6–12)
POTASSIUM SERPL-SCNC: 4.4 MMOL/L (ref 3.5–5.2)
QT INTERVAL: 416 MS
QTC INTERVAL: 408 MS
RBC # BLD AUTO: 4.91 10*6/MM3 (ref 3.77–5.28)
SODIUM SERPL-SCNC: 143 MMOL/L (ref 136–145)
WBC NRBC COR # BLD AUTO: 5.56 10*3/MM3 (ref 3.4–10.8)

## 2024-02-16 PROCEDURE — 80048 BASIC METABOLIC PNL TOTAL CA: CPT

## 2024-02-16 PROCEDURE — 36415 COLL VENOUS BLD VENIPUNCTURE: CPT

## 2024-02-16 PROCEDURE — 93005 ELECTROCARDIOGRAM TRACING: CPT

## 2024-02-16 PROCEDURE — 93010 ELECTROCARDIOGRAM REPORT: CPT | Performed by: INTERNAL MEDICINE

## 2024-02-16 PROCEDURE — 85027 COMPLETE CBC AUTOMATED: CPT

## 2024-02-16 PROCEDURE — 83036 HEMOGLOBIN GLYCOSYLATED A1C: CPT

## 2024-02-16 RX ORDER — POTASSIUM CHLORIDE 1500 MG/1
20 TABLET, EXTENDED RELEASE ORAL EVERY MORNING
COMMUNITY
Start: 2023-12-27

## 2024-02-16 RX ORDER — HYDROXYZINE HYDROCHLORIDE 10 MG/1
10-30 TABLET, FILM COATED ORAL EVERY 4 HOURS PRN
COMMUNITY
Start: 2023-12-27

## 2024-02-16 NOTE — PAT
An arrival time for procedure was not provided during PAT visit. If patient had any questions or concerns about their arrival time, they were instructed to contact their surgeon/physician.  Additionally, if the patient referred to an arrival time that was acquired from their my chart account, patient was encouraged to verify that time with their surgeon/physician. Arrival times are NOT provided in Pre Admission Testing Department.    Patient viewed general PAT education video as instructed in their preoperative information received from their surgeon.  Patient stated the general PAT education video was viewed in its entirety and survey completed.  Copies of PAT general education handouts (Incentive Spirometry, Meds to Beds Program, Patient Belongings, Pre-op skin preparation instructions, Blood Glucose testing, Visitor policy, Surgery FAQ, Code H) distributed to patient if not printed. Education related to the PAT pass and skin preparation for surgery (if applicable) completed in PAT as a reinforcement to PAT education video. Patient instructed to return PAT pass provided today as well as completed skin preparation sheet (if applicable) on the day of procedure.     Additionally if patient had not viewed video yet but intended to view it at home or in our waiting area, then referred them to the handout with QR code/link provided during PAT visit.  Instructed patient to complete survey after viewing the video in its entirety.  Encouraged patient/family to read PAT general education handouts thoroughly and notify PAT staff with any questions or concerns. Patient verbalized understanding of all information and priority content.    Patient denies any current skin issues.     Patient to apply Chlorhexadine wipes  to surgical area (as instructed) the night before procedure and the AM of procedure. Wipes provided.    Patient instructed to drink 20 ounces of Gatorade or Gatorlyte (if diabetic) and it needs to be completed 1  hour (for Main OR patients) or 2 hours (scheduled  section & [facility] patients) before given arrival time for procedure (NO RED Gatorade and NO Gatorade Zero).    Patient verbalized understanding.    It was noted during Pre Admission Testing that patient was wearing some form of fingernail polish (gel/regular) and/or acrylic/artificial nails.  Patient was told that polish and/or artificial nails must be removed for surgery.  If a patient had recent manicure, and would rather not remove polish or artificial nails. Then the minimum requirement is that the polish/artificial nails must be removed from the middle finger on each hand.  Patient verbalized understanding.    If patient was having surgery on an upper extremity, then the patient was instructed that fingernail polish/artificial fingernails must be removed for surgery.  NO EXCEPTIONS.  Patient verbalized understanding.    If patient was having surgery on a lower extremity, then the patient was instructed that toenail polish on both extremities must be removed for surgery.  NO EXCEPTIONS. Patient verbalized understanding.    [employer] (by [employer]) pain pump patient informational handout given to patient.  Instructed patient to watch [employer] Patient Education Video regarding Peripheral Nerve Catheter that will be in place for upcoming surgery unless contraindicated. The video can be accessed using QR code noted on handout.  Patient agreed to watch video.  Stressed to patient to call [employer] Nursing Hotline  if patient has any questions or concerns after discharge.     Post-Surgery Information Instruction Sheet given to patient during Pre-Admission Testing Visit with verbal instructions to patient to return with PAT PASS on the day of surgery. Additionally, encouraged patient to review the information provided.    Discussed with patient options for receiving total joint replacement education and assessed patient's ability and preference. Joint  Replacement Guide given to patient during PAT visit since not received a copy within the last year. Encouraged patient/family to read guide thoroughly and notify PAT staff with any questions or concerns. Handout provided directing patient to links to watch online videos related to joint replacement surgery on the [facility] website. The handout gives detailed instructions for joining an online joint replacement class through Zoom or phone conference offered on Thursdays. Patient agreed to participate by watching videos online. Patient verbalized understanding of instructions and to complete the online learning tool survey. Encouraged to share information with family and/or . An overview of the joint replacement education was provided during the visit including general perioperative instructions that are routine for all surgical patients (PAT PASS, wipes, directions to pre-op, etc.).

## 2024-02-16 NOTE — DISCHARGE INSTRUCTIONS
The following information and instructions were given:    Do not eat, drink, smoke or chew gum after midnight the night before surgery. This includes no mints.  Take all routine, prescribed medications including heart and blood pressure medicines with a sip of water unless otherwise instructed by your physician.   Do NOT take diabetic medication unless instructed by your physician.      DO NOT shave for two days before your procedure.  Do not wear makeup.      DO NOT wear fingernail polish (gel/regular) and/or acrylic/artificial nails on the day of surgery. If you had a recent manicure and would rather not remove polish or artificial nails, the minimum requirement is that the polish/artificial nails must be removed from the middle finger on each hand.      If you are having surgery/procedure on an upper extremity, fingernail polish/artificial fingernails must be removed for surgery.  NO EXCEPTIONS.      If you are having surgery/procedure on a lower extremity, toenail polish on both extremities must be removed for surgery.  NO EXCEPTIONS.    Remove all jewelry (advise to go to jeweler if unable to remove).  Jewelry, especially rings, can no longer be taped for surgery.    Leave anything you consider valuable at home.    Leave your suitcase in the car until after your surgery if you are staying overnight.    Bring the following with you the day of your procedure (when applicable):       -Picture ID and insurance cards       -Co-pay/deductible required by insurance       -Medications in the original bottles or a detailed list (name, dosage, frequency of medications) including all over-the-counter medications if not brought to PAT       -Copy of advance directive, living will or power of  documents if not brought to PAT       -CPAP or BIPAP mask and tubing (do not bring machine)       -Skin prep instruction(s) sheet       -PAT Pass    Educational handout or binder (joint replacements) related to procedure given  to patient.  Educational handout also includes general information related to the recovery that mentions signs and symptoms of infection and when to call the doctor.    When applicable, an ERAS handout was given to patient.    Respirex use and pneumonia prevention education provided in Pre Admission Testing general education video.    Information related to infection and hand hygiene mentioned in Pre Admission Testing general education video. Patient instructed to call their doctor if any of the following symptoms are noted during recovery:  Fever of 100.4 F or higher, incision that is warm or has increasing bleeding, redness or drainage.    DVT Prevention instructions given in general education video presentation during Pre Admission Testing appointment that stress the importance of ambulation to improve blood circulation.  Also encouraged patient to perform foot exercises when in bed and application of a sequential device may be applied to lower extremities to improve circulation.      Please apply Chlorhexidine wipes to surgical area (if instructed) the night before procedure and the AM of procedure and document date/time of applications on skin prep instruction sheet.

## 2024-02-28 ENCOUNTER — ANESTHESIA EVENT (OUTPATIENT)
Dept: PERIOP | Facility: HOSPITAL | Age: 69
End: 2024-02-28
Payer: MEDICARE

## 2024-02-28 RX ORDER — FAMOTIDINE 10 MG/ML
20 INJECTION, SOLUTION INTRAVENOUS ONCE
Status: CANCELLED | OUTPATIENT
Start: 2024-02-28 | End: 2024-02-28

## 2024-02-28 NOTE — ANESTHESIA PREPROCEDURE EVALUATION
Anesthesia Evaluation     Patient summary reviewed and Nursing notes reviewed   no history of anesthetic complications:   NPO Solid Status: > 8 hours  NPO Liquid Status: > 2 hours           Airway   Mallampati: II  TM distance: >3 FB  Neck ROM: full  No difficulty expected  Dental    (+) partials    Pulmonary     breath sounds clear to auscultation  (+) a smoker Former, asthma,sleep apnea on CPAP  Cardiovascular     ECG reviewed  Rhythm: regular  Rate: normal    (+) hypertension, dysrhythmias Atrial Flutter, Paroxysmal Atrial Fib, hyperlipidemia      Neuro/Psych  (+) psychiatric history Depression and Anxiety  (-) seizures, TIA, CVA  GI/Hepatic/Renal/Endo    (+) obesity, GERD well controlled, diabetes mellitus (diet controlloed; no meds), thyroid problem hypothyroidism  (-) liver disease, no renal disease    Musculoskeletal     Abdominal    Substance History      OB/GYN          Other   arthritis,     ROS/Med Hx Other: Blade: Taylor  Blade size: 3  ETT size (mm): 7.0  Cormack-Lehane Classification: grade I - full view of glottis    3/17/22   Estimated left ventricular EF = 56% Estimated left ventricular EF was in agreement with the calculated left ventricular EF. Left ventricular ejection fraction appears to be 56 - 60%. Left ventricular systolic function is normal.   Estimated right ventricular systolic pressure from tricuspid regurgitation is normal (<35 mmHg).   Left ventricular diastolic function is consistent with (grade I) impaired relaxation.   Normal right ventricular cavity size, wall thickness and septal motion noted with mildly reduced right ventricular systolic function.   No evidence of pulmonary hypertension is present.   There is no evidence of pericardial effusion.   No significant structural or functional valvular abnormalities demonstrated.                     Anesthesia Plan    ASA 3     spinal     (Post op block)    Anesthetic plan, risks, benefits, and alternatives have been provided,  discussed and informed consent has been obtained with: patient.    Plan discussed with CRNA.      CODE STATUS:

## 2024-02-29 ENCOUNTER — ANESTHESIA (OUTPATIENT)
Dept: PERIOP | Facility: HOSPITAL | Age: 69
End: 2024-02-29
Payer: MEDICARE

## 2024-02-29 ENCOUNTER — ANESTHESIA EVENT CONVERTED (OUTPATIENT)
Dept: ANESTHESIOLOGY | Facility: HOSPITAL | Age: 69
End: 2024-02-29
Payer: MEDICARE

## 2024-02-29 ENCOUNTER — APPOINTMENT (OUTPATIENT)
Dept: GENERAL RADIOLOGY | Facility: HOSPITAL | Age: 69
End: 2024-02-29
Payer: MEDICARE

## 2024-02-29 ENCOUNTER — HOSPITAL ENCOUNTER (OUTPATIENT)
Facility: HOSPITAL | Age: 69
Discharge: HOME | End: 2024-03-01
Attending: ORTHOPAEDIC SURGERY | Admitting: ORTHOPAEDIC SURGERY
Payer: MEDICARE

## 2024-02-29 DIAGNOSIS — E78.5 HYPERLIPIDEMIA LDL GOAL <100: ICD-10-CM

## 2024-02-29 DIAGNOSIS — Z98.890 STATUS POST INCISION AND DRAINAGE: ICD-10-CM

## 2024-02-29 DIAGNOSIS — I10 ESSENTIAL HYPERTENSION: ICD-10-CM

## 2024-02-29 DIAGNOSIS — G47.33 OSA ON CPAP: ICD-10-CM

## 2024-02-29 DIAGNOSIS — Z96.651 STATUS POST RIGHT KNEE REPLACEMENT: Primary | ICD-10-CM

## 2024-02-29 DIAGNOSIS — E11.9 TYPE 2 DIABETES MELLITUS WITHOUT COMPLICATION, WITHOUT LONG-TERM CURRENT USE OF INSULIN: ICD-10-CM

## 2024-02-29 DIAGNOSIS — I48.92 PAROXYSMAL ATRIAL FLUTTER: ICD-10-CM

## 2024-02-29 DIAGNOSIS — F32.A ANXIETY AND DEPRESSION: ICD-10-CM

## 2024-02-29 DIAGNOSIS — G89.18 POSTOPERATIVE PAIN: ICD-10-CM

## 2024-02-29 DIAGNOSIS — F41.9 ANXIETY AND DEPRESSION: ICD-10-CM

## 2024-02-29 DIAGNOSIS — M17.11 PRIMARY LOCALIZED OSTEOARTHRITIS OF RIGHT KNEE: ICD-10-CM

## 2024-02-29 DIAGNOSIS — Z96.612 STATUS POST TOTAL SHOULDER ARTHROPLASTY, LEFT: ICD-10-CM

## 2024-02-29 LAB
GLUCOSE BLDC GLUCOMTR-MCNC: 164 MG/DL (ref 70–130)
GLUCOSE BLDC GLUCOMTR-MCNC: 392 MG/DL (ref 70–130)
GLUCOSE BLDC GLUCOMTR-MCNC: 96 MG/DL (ref 70–130)
POTASSIUM SERPL-SCNC: 3.9 MMOL/L (ref 3.5–5.2)

## 2024-02-29 PROCEDURE — C1776 JOINT DEVICE (IMPLANTABLE): HCPCS | Performed by: ORTHOPAEDIC SURGERY

## 2024-02-29 PROCEDURE — 25810000003 SODIUM CHLORIDE 0.9 % SOLUTION: Performed by: ORTHOPAEDIC SURGERY

## 2024-02-29 PROCEDURE — A9270 NON-COVERED ITEM OR SERVICE: HCPCS | Performed by: ORTHOPAEDIC SURGERY

## 2024-02-29 PROCEDURE — A9270 NON-COVERED ITEM OR SERVICE: HCPCS | Performed by: INTERNAL MEDICINE

## 2024-02-29 PROCEDURE — 25010000002 CEFAZOLIN PER 500 MG: Performed by: ORTHOPAEDIC SURGERY

## 2024-02-29 PROCEDURE — 63710000001 INSULIN LISPRO (HUMAN) PER 5 UNITS: Performed by: INTERNAL MEDICINE

## 2024-02-29 PROCEDURE — 25010000002 MORPHINE PER 10 MG: Performed by: ORTHOPAEDIC SURGERY

## 2024-02-29 PROCEDURE — 25810000003 LACTATED RINGERS PER 1000 ML: Performed by: ANESTHESIOLOGY

## 2024-02-29 PROCEDURE — 63710000001 TRAZODONE 50 MG TABLET: Performed by: INTERNAL MEDICINE

## 2024-02-29 PROCEDURE — 97162 PT EVAL MOD COMPLEX 30 MIN: CPT

## 2024-02-29 PROCEDURE — 25010000002 ROPIVACAINE HCL-NACL 0.2-0.9 % SOLUTION: Performed by: NURSE ANESTHETIST, CERTIFIED REGISTERED

## 2024-02-29 PROCEDURE — 84132 ASSAY OF SERUM POTASSIUM: CPT | Performed by: ANESTHESIOLOGY

## 2024-02-29 PROCEDURE — 82948 REAGENT STRIP/BLOOD GLUCOSE: CPT

## 2024-02-29 PROCEDURE — S0260 H&P FOR SURGERY: HCPCS

## 2024-02-29 PROCEDURE — 63710000001 OXYCODONE 5 MG TABLET: Performed by: ORTHOPAEDIC SURGERY

## 2024-02-29 PROCEDURE — 25010000002 DROPERIDOL PER 5 MG: Performed by: ANESTHESIOLOGY

## 2024-02-29 PROCEDURE — 25010000002 BUPIVACAINE (PF) 0.25 % SOLUTION: Performed by: NURSE ANESTHETIST, CERTIFIED REGISTERED

## 2024-02-29 PROCEDURE — 25010000002 DEXAMETHASONE PER 1 MG: Performed by: ANESTHESIOLOGY

## 2024-02-29 PROCEDURE — 25010000002 MEPIVACAINE HCL (PF) 1.5 % SOLUTION: Performed by: ANESTHESIOLOGY

## 2024-02-29 PROCEDURE — C1713 ANCHOR/SCREW BN/BN,TIS/BN: HCPCS | Performed by: ORTHOPAEDIC SURGERY

## 2024-02-29 PROCEDURE — C1755 CATHETER, INTRASPINAL: HCPCS | Performed by: ORTHOPAEDIC SURGERY

## 2024-02-29 PROCEDURE — 25010000002 FENTANYL CITRATE (PF) 50 MCG/ML SOLUTION

## 2024-02-29 PROCEDURE — 63710000001 ACETAMINOPHEN EXTRA STRENGTH 500 MG TABLET: Performed by: ORTHOPAEDIC SURGERY

## 2024-02-29 PROCEDURE — 63710000001 MELOXICAM 15 MG TABLET: Performed by: ORTHOPAEDIC SURGERY

## 2024-02-29 PROCEDURE — 63710000001 METOPROLOL TARTRATE 25 MG TABLET: Performed by: INTERNAL MEDICINE

## 2024-02-29 PROCEDURE — 25010000002 ONDANSETRON PER 1 MG: Performed by: ANESTHESIOLOGY

## 2024-02-29 PROCEDURE — 25010000002 ROPIVACAINE PER 1 MG: Performed by: ORTHOPAEDIC SURGERY

## 2024-02-29 PROCEDURE — 73560 X-RAY EXAM OF KNEE 1 OR 2: CPT

## 2024-02-29 PROCEDURE — 97116 GAIT TRAINING THERAPY: CPT

## 2024-02-29 PROCEDURE — 25010000002 PROPOFOL 10 MG/ML EMULSION: Performed by: ANESTHESIOLOGY

## 2024-02-29 PROCEDURE — 63710000001 ATORVASTATIN 20 MG TABLET: Performed by: INTERNAL MEDICINE

## 2024-02-29 PROCEDURE — 63710000001 INSULIN DETEMIR PER 5 UNITS: Performed by: INTERNAL MEDICINE

## 2024-02-29 DEVICE — IMPLANTABLE DEVICE: Type: IMPLANTABLE DEVICE | Site: KNEE | Status: FUNCTIONAL

## 2024-02-29 DEVICE — CMT BONE PALACOS R HI/VISC 1X40: Type: IMPLANTABLE DEVICE | Site: KNEE | Status: FUNCTIONAL

## 2024-02-29 DEVICE — DEV CONTRL TISS STRATAFIX SYMM PDS PLUS VIL CT-1 60CM: Type: IMPLANTABLE DEVICE | Site: KNEE | Status: FUNCTIONAL

## 2024-02-29 DEVICE — DEV CONTRL TISS STRATAFIX SPIRAL MNCRYL PLS PS 3/0 30CM UD: Type: IMPLANTABLE DEVICE | Site: KNEE | Status: FUNCTIONAL

## 2024-02-29 RX ORDER — INSULIN LISPRO 100 [IU]/ML
2-7 INJECTION, SOLUTION INTRAVENOUS; SUBCUTANEOUS
Status: DISCONTINUED | OUTPATIENT
Start: 2024-02-29 | End: 2024-03-01 | Stop reason: HOSPADM

## 2024-02-29 RX ORDER — FAMOTIDINE 20 MG/1
20 TABLET, FILM COATED ORAL ONCE
Status: COMPLETED | OUTPATIENT
Start: 2024-02-29 | End: 2024-02-29

## 2024-02-29 RX ORDER — BISACODYL 5 MG/1
10 TABLET, DELAYED RELEASE ORAL DAILY PRN
Status: DISCONTINUED | OUTPATIENT
Start: 2024-02-29 | End: 2024-03-01 | Stop reason: HOSPADM

## 2024-02-29 RX ORDER — PROPOFOL 10 MG/ML
VIAL (ML) INTRAVENOUS AS NEEDED
Status: DISCONTINUED | OUTPATIENT
Start: 2024-02-29 | End: 2024-02-29 | Stop reason: SURG

## 2024-02-29 RX ORDER — TRANEXAMIC ACID 10 MG/ML
1000 INJECTION, SOLUTION INTRAVENOUS ONCE
Status: DISCONTINUED | OUTPATIENT
Start: 2024-02-29 | End: 2024-02-29 | Stop reason: HOSPADM

## 2024-02-29 RX ORDER — HYDROMORPHONE HYDROCHLORIDE 1 MG/ML
0.5 INJECTION, SOLUTION INTRAMUSCULAR; INTRAVENOUS; SUBCUTANEOUS
Status: DISCONTINUED | OUTPATIENT
Start: 2024-02-29 | End: 2024-02-29

## 2024-02-29 RX ORDER — SODIUM CHLORIDE 9 MG/ML
100 INJECTION, SOLUTION INTRAVENOUS CONTINUOUS
Status: DISCONTINUED | OUTPATIENT
Start: 2024-02-29 | End: 2024-03-01 | Stop reason: HOSPADM

## 2024-02-29 RX ORDER — MAGNESIUM HYDROXIDE 1200 MG/15ML
LIQUID ORAL AS NEEDED
Status: DISCONTINUED | OUTPATIENT
Start: 2024-02-29 | End: 2024-02-29 | Stop reason: HOSPADM

## 2024-02-29 RX ORDER — EPHEDRINE SULFATE 50 MG/ML
INJECTION INTRAVENOUS AS NEEDED
Status: DISCONTINUED | OUTPATIENT
Start: 2024-02-29 | End: 2024-02-29 | Stop reason: SURG

## 2024-02-29 RX ORDER — ROPIVACAINE HYDROCHLORIDE 2 MG/ML
INJECTION, SOLUTION EPIDURAL; INFILTRATION; PERINEURAL CONTINUOUS
Status: DISCONTINUED | OUTPATIENT
Start: 2024-02-29 | End: 2024-03-01 | Stop reason: HOSPADM

## 2024-02-29 RX ORDER — TRANEXAMIC ACID 10 MG/ML
1000 INJECTION, SOLUTION INTRAVENOUS ONCE
Status: COMPLETED | OUTPATIENT
Start: 2024-02-29 | End: 2024-02-29

## 2024-02-29 RX ORDER — BISACODYL 10 MG
10 SUPPOSITORY, RECTAL RECTAL DAILY PRN
Status: DISCONTINUED | OUTPATIENT
Start: 2024-02-29 | End: 2024-03-01 | Stop reason: HOSPADM

## 2024-02-29 RX ORDER — DIPHENHYDRAMINE HCL 25 MG
25 CAPSULE ORAL EVERY 6 HOURS PRN
Status: DISCONTINUED | OUTPATIENT
Start: 2024-02-29 | End: 2024-03-01 | Stop reason: HOSPADM

## 2024-02-29 RX ORDER — MELOXICAM 15 MG/1
15 TABLET ORAL DAILY
Status: DISCONTINUED | OUTPATIENT
Start: 2024-02-29 | End: 2024-03-01 | Stop reason: HOSPADM

## 2024-02-29 RX ORDER — ONDANSETRON 4 MG/1
4 TABLET, ORALLY DISINTEGRATING ORAL EVERY 6 HOURS PRN
Status: DISCONTINUED | OUTPATIENT
Start: 2024-02-29 | End: 2024-03-01 | Stop reason: HOSPADM

## 2024-02-29 RX ORDER — ONDANSETRON 2 MG/ML
4 INJECTION INTRAMUSCULAR; INTRAVENOUS ONCE AS NEEDED
Status: DISCONTINUED | OUTPATIENT
Start: 2024-02-29 | End: 2024-02-29

## 2024-02-29 RX ORDER — SODIUM CHLORIDE 0.9 % (FLUSH) 0.9 %
10 SYRINGE (ML) INJECTION EVERY 12 HOURS SCHEDULED
Status: DISCONTINUED | OUTPATIENT
Start: 2024-02-29 | End: 2024-02-29 | Stop reason: HOSPADM

## 2024-02-29 RX ORDER — IBUPROFEN 600 MG/1
1 TABLET ORAL
Status: DISCONTINUED | OUTPATIENT
Start: 2024-02-29 | End: 2024-03-01 | Stop reason: HOSPADM

## 2024-02-29 RX ORDER — EPHEDRINE SULFATE 50 MG/ML
5 INJECTION, SOLUTION INTRAVENOUS ONCE AS NEEDED
Status: DISCONTINUED | OUTPATIENT
Start: 2024-02-29 | End: 2024-02-29

## 2024-02-29 RX ORDER — SODIUM CHLORIDE 9 MG/ML
40 INJECTION, SOLUTION INTRAVENOUS AS NEEDED
Status: DISCONTINUED | OUTPATIENT
Start: 2024-02-29 | End: 2024-02-29 | Stop reason: HOSPADM

## 2024-02-29 RX ORDER — SODIUM CHLORIDE, SODIUM LACTATE, POTASSIUM CHLORIDE, CALCIUM CHLORIDE 600; 310; 30; 20 MG/100ML; MG/100ML; MG/100ML; MG/100ML
9 INJECTION, SOLUTION INTRAVENOUS CONTINUOUS
Status: DISCONTINUED | OUTPATIENT
Start: 2024-02-29 | End: 2024-02-29 | Stop reason: SDUPTHER

## 2024-02-29 RX ORDER — SODIUM CHLORIDE, SODIUM LACTATE, POTASSIUM CHLORIDE, CALCIUM CHLORIDE 600; 310; 30; 20 MG/100ML; MG/100ML; MG/100ML; MG/100ML
100 INJECTION, SOLUTION INTRAVENOUS CONTINUOUS
Status: DISCONTINUED | OUTPATIENT
Start: 2024-02-29 | End: 2024-02-29 | Stop reason: SDUPTHER

## 2024-02-29 RX ORDER — LIDOCAINE HYDROCHLORIDE 10 MG/ML
0.5 INJECTION, SOLUTION EPIDURAL; INFILTRATION; INTRACAUDAL; PERINEURAL ONCE AS NEEDED
Status: COMPLETED | OUTPATIENT
Start: 2024-02-29 | End: 2024-02-29

## 2024-02-29 RX ORDER — ONDANSETRON 2 MG/ML
4 INJECTION INTRAMUSCULAR; INTRAVENOUS EVERY 6 HOURS PRN
Status: DISCONTINUED | OUTPATIENT
Start: 2024-02-29 | End: 2024-03-01 | Stop reason: HOSPADM

## 2024-02-29 RX ORDER — ONDANSETRON 2 MG/ML
INJECTION INTRAMUSCULAR; INTRAVENOUS AS NEEDED
Status: DISCONTINUED | OUTPATIENT
Start: 2024-02-29 | End: 2024-02-29 | Stop reason: SURG

## 2024-02-29 RX ORDER — NALOXONE HCL 0.4 MG/ML
0.1 VIAL (ML) INJECTION
Status: DISCONTINUED | OUTPATIENT
Start: 2024-02-29 | End: 2024-03-01 | Stop reason: HOSPADM

## 2024-02-29 RX ORDER — NALOXONE HCL 0.4 MG/ML
0.4 VIAL (ML) INJECTION AS NEEDED
Status: DISCONTINUED | OUTPATIENT
Start: 2024-02-29 | End: 2024-02-29

## 2024-02-29 RX ORDER — NICOTINE POLACRILEX 4 MG
15 LOZENGE BUCCAL
Status: DISCONTINUED | OUTPATIENT
Start: 2024-02-29 | End: 2024-03-01 | Stop reason: HOSPADM

## 2024-02-29 RX ORDER — AMOXICILLIN 250 MG
2 CAPSULE ORAL 2 TIMES DAILY PRN
Status: DISCONTINUED | OUTPATIENT
Start: 2024-02-29 | End: 2024-03-01 | Stop reason: HOSPADM

## 2024-02-29 RX ORDER — FENTANYL CITRATE 50 UG/ML
50 INJECTION, SOLUTION INTRAMUSCULAR; INTRAVENOUS
Status: DISCONTINUED | OUTPATIENT
Start: 2024-02-29 | End: 2024-02-29

## 2024-02-29 RX ORDER — KETOROLAC TROMETHAMINE 30 MG/ML
15 INJECTION, SOLUTION INTRAMUSCULAR; INTRAVENOUS EVERY 6 HOURS PRN
Status: DISCONTINUED | OUTPATIENT
Start: 2024-02-29 | End: 2024-02-29

## 2024-02-29 RX ORDER — MIDAZOLAM HYDROCHLORIDE 1 MG/ML
0.5 INJECTION INTRAMUSCULAR; INTRAVENOUS
Status: DISCONTINUED | OUTPATIENT
Start: 2024-02-29 | End: 2024-02-29 | Stop reason: HOSPADM

## 2024-02-29 RX ORDER — TRAZODONE HYDROCHLORIDE 50 MG/1
50 TABLET ORAL NIGHTLY
Status: DISCONTINUED | OUTPATIENT
Start: 2024-02-29 | End: 2024-03-01 | Stop reason: HOSPADM

## 2024-02-29 RX ORDER — ATORVASTATIN CALCIUM 20 MG/1
20 TABLET, FILM COATED ORAL NIGHTLY
Status: DISCONTINUED | OUTPATIENT
Start: 2024-02-29 | End: 2024-03-01 | Stop reason: HOSPADM

## 2024-02-29 RX ORDER — DEXTROSE MONOHYDRATE 25 G/50ML
25 INJECTION, SOLUTION INTRAVENOUS
Status: DISCONTINUED | OUTPATIENT
Start: 2024-02-29 | End: 2024-03-01 | Stop reason: HOSPADM

## 2024-02-29 RX ORDER — DROPERIDOL 2.5 MG/ML
0.62 INJECTION, SOLUTION INTRAMUSCULAR; INTRAVENOUS ONCE
Status: COMPLETED | OUTPATIENT
Start: 2024-02-29 | End: 2024-02-29

## 2024-02-29 RX ORDER — ACETAMINOPHEN 500 MG
1000 TABLET ORAL EVERY 6 HOURS
Status: DISCONTINUED | OUTPATIENT
Start: 2024-02-29 | End: 2024-03-01 | Stop reason: HOSPADM

## 2024-02-29 RX ORDER — FENTANYL CITRATE 50 UG/ML
INJECTION, SOLUTION INTRAMUSCULAR; INTRAVENOUS
Status: COMPLETED
Start: 2024-02-29 | End: 2024-02-29

## 2024-02-29 RX ORDER — OXYCODONE HYDROCHLORIDE 5 MG/1
5 TABLET ORAL EVERY 4 HOURS PRN
Status: DISCONTINUED | OUTPATIENT
Start: 2024-02-29 | End: 2024-03-01 | Stop reason: HOSPADM

## 2024-02-29 RX ORDER — LEVOTHYROXINE SODIUM 88 UG/1
88 TABLET ORAL
Status: DISCONTINUED | OUTPATIENT
Start: 2024-03-01 | End: 2024-03-01 | Stop reason: HOSPADM

## 2024-02-29 RX ORDER — LABETALOL HYDROCHLORIDE 5 MG/ML
10 INJECTION, SOLUTION INTRAVENOUS EVERY 4 HOURS PRN
Status: DISCONTINUED | OUTPATIENT
Start: 2024-02-29 | End: 2024-03-01 | Stop reason: HOSPADM

## 2024-02-29 RX ORDER — INSULIN LISPRO 100 [IU]/ML
4 INJECTION, SOLUTION INTRAVENOUS; SUBCUTANEOUS
Status: DISCONTINUED | OUTPATIENT
Start: 2024-03-01 | End: 2024-03-01 | Stop reason: HOSPADM

## 2024-02-29 RX ORDER — BUPIVACAINE HCL/0.9 % NACL/PF 0.125 %
PLASTIC BAG, INJECTION (ML) EPIDURAL AS NEEDED
Status: DISCONTINUED | OUTPATIENT
Start: 2024-02-29 | End: 2024-02-29 | Stop reason: SURG

## 2024-02-29 RX ORDER — DIPHENHYDRAMINE HYDROCHLORIDE 50 MG/ML
25 INJECTION INTRAMUSCULAR; INTRAVENOUS EVERY 6 HOURS PRN
Status: DISCONTINUED | OUTPATIENT
Start: 2024-02-29 | End: 2024-03-01 | Stop reason: HOSPADM

## 2024-02-29 RX ORDER — OXYCODONE HYDROCHLORIDE 10 MG/1
10 TABLET ORAL EVERY 4 HOURS PRN
Status: DISCONTINUED | OUTPATIENT
Start: 2024-02-29 | End: 2024-03-01 | Stop reason: HOSPADM

## 2024-02-29 RX ORDER — HYDROMORPHONE HYDROCHLORIDE 2 MG/ML
0.5 INJECTION, SOLUTION INTRAMUSCULAR; INTRAVENOUS; SUBCUTANEOUS
Status: DISCONTINUED | OUTPATIENT
Start: 2024-02-29 | End: 2024-03-01 | Stop reason: HOSPADM

## 2024-02-29 RX ORDER — DEXAMETHASONE SODIUM PHOSPHATE 4 MG/ML
INJECTION, SOLUTION INTRA-ARTICULAR; INTRALESIONAL; INTRAMUSCULAR; INTRAVENOUS; SOFT TISSUE AS NEEDED
Status: DISCONTINUED | OUTPATIENT
Start: 2024-02-29 | End: 2024-02-29 | Stop reason: SURG

## 2024-02-29 RX ORDER — ASPIRIN 325 MG
325 TABLET, DELAYED RELEASE (ENTERIC COATED) ORAL DAILY
Status: DISCONTINUED | OUTPATIENT
Start: 2024-03-01 | End: 2024-03-01 | Stop reason: HOSPADM

## 2024-02-29 RX ORDER — SODIUM CHLORIDE 0.9 % (FLUSH) 0.9 %
10 SYRINGE (ML) INJECTION AS NEEDED
Status: DISCONTINUED | OUTPATIENT
Start: 2024-02-29 | End: 2024-02-29 | Stop reason: HOSPADM

## 2024-02-29 RX ORDER — BUPIVACAINE HYDROCHLORIDE 2.5 MG/ML
INJECTION, SOLUTION EPIDURAL; INFILTRATION; INTRACAUDAL
Status: DISCONTINUED | OUTPATIENT
Start: 2024-02-29 | End: 2024-02-29 | Stop reason: SURG

## 2024-02-29 RX ORDER — DOCUSATE SODIUM 100 MG/1
100 CAPSULE, LIQUID FILLED ORAL 2 TIMES DAILY PRN
Status: DISCONTINUED | OUTPATIENT
Start: 2024-02-29 | End: 2024-03-01 | Stop reason: HOSPADM

## 2024-02-29 RX ORDER — LIDOCAINE HYDROCHLORIDE 10 MG/ML
INJECTION, SOLUTION EPIDURAL; INFILTRATION; INTRACAUDAL; PERINEURAL AS NEEDED
Status: DISCONTINUED | OUTPATIENT
Start: 2024-02-29 | End: 2024-02-29 | Stop reason: SURG

## 2024-02-29 RX ADMIN — LIDOCAINE HYDROCHLORIDE 50 MG: 10 INJECTION, SOLUTION EPIDURAL; INFILTRATION; INTRACAUDAL; PERINEURAL at 12:23

## 2024-02-29 RX ADMIN — INSULIN DETEMIR 15 UNITS: 100 INJECTION, SOLUTION SUBCUTANEOUS at 21:02

## 2024-02-29 RX ADMIN — TRANEXAMIC ACID 1000 MG: 10 INJECTION, SOLUTION INTRAVENOUS at 13:25

## 2024-02-29 RX ADMIN — TRANEXAMIC ACID 1000 MG: 10 INJECTION, SOLUTION INTRAVENOUS at 12:32

## 2024-02-29 RX ADMIN — DROPERIDOL 0.62 MG: 2.5 INJECTION, SOLUTION INTRAMUSCULAR; INTRAVENOUS at 14:45

## 2024-02-29 RX ADMIN — METOPROLOL TARTRATE 25 MG: 25 TABLET, FILM COATED ORAL at 19:17

## 2024-02-29 RX ADMIN — MELOXICAM 15 MG: 15 TABLET ORAL at 17:29

## 2024-02-29 RX ADMIN — Medication 100 MCG: at 13:37

## 2024-02-29 RX ADMIN — INSULIN LISPRO 6 UNITS: 100 INJECTION, SOLUTION INTRAVENOUS; SUBCUTANEOUS at 21:02

## 2024-02-29 RX ADMIN — Medication 100 MCG: at 12:52

## 2024-02-29 RX ADMIN — EPHEDRINE SULFATE 2.5 MG: 50 INJECTION INTRAVENOUS at 13:20

## 2024-02-29 RX ADMIN — FENTANYL CITRATE 50 MCG: 50 INJECTION, SOLUTION INTRAMUSCULAR; INTRAVENOUS at 15:39

## 2024-02-29 RX ADMIN — INSULIN LISPRO 2 UNITS: 100 INJECTION, SOLUTION INTRAVENOUS; SUBCUTANEOUS at 17:29

## 2024-02-29 RX ADMIN — Medication 50 MCG: at 13:02

## 2024-02-29 RX ADMIN — Medication 10 ML: at 16:42

## 2024-02-29 RX ADMIN — Medication 1000 MG: at 13:59

## 2024-02-29 RX ADMIN — BUPIVACAINE HYDROCHLORIDE 15 ML: 2.5 INJECTION, SOLUTION EPIDURAL; INFILTRATION; INTRACAUDAL; PERINEURAL at 13:59

## 2024-02-29 RX ADMIN — ATORVASTATIN CALCIUM 20 MG: 20 TABLET, FILM COATED ORAL at 19:16

## 2024-02-29 RX ADMIN — SODIUM CHLORIDE 100 ML/HR: 9 INJECTION, SOLUTION INTRAVENOUS at 16:41

## 2024-02-29 RX ADMIN — LIDOCAINE HYDROCHLORIDE 0.5 ML: 10 INJECTION, SOLUTION EPIDURAL; INFILTRATION; INTRACAUDAL; PERINEURAL at 10:59

## 2024-02-29 RX ADMIN — ONDANSETRON 4 MG: 2 INJECTION INTRAMUSCULAR; INTRAVENOUS at 13:26

## 2024-02-29 RX ADMIN — PROPOFOL 30 MG: 10 INJECTION, EMULSION INTRAVENOUS at 12:23

## 2024-02-29 RX ADMIN — ACETAMINOPHEN 1000 MG: 500 TABLET ORAL at 17:29

## 2024-02-29 RX ADMIN — FAMOTIDINE 20 MG: 20 TABLET, FILM COATED ORAL at 11:17

## 2024-02-29 RX ADMIN — DEXAMETHASONE SODIUM PHOSPHATE 4 MG: 4 INJECTION, SOLUTION INTRA-ARTICULAR; INTRALESIONAL; INTRAMUSCULAR; INTRAVENOUS; SOFT TISSUE at 12:23

## 2024-02-29 RX ADMIN — OXYCODONE HYDROCHLORIDE 5 MG: 5 TABLET ORAL at 17:29

## 2024-02-29 RX ADMIN — PROPOFOL 150 MCG/KG/MIN: 10 INJECTION, EMULSION INTRAVENOUS at 12:28

## 2024-02-29 RX ADMIN — EPHEDRINE SULFATE 5 MG: 50 INJECTION INTRAVENOUS at 12:53

## 2024-02-29 RX ADMIN — SODIUM CHLORIDE, POTASSIUM CHLORIDE, SODIUM LACTATE AND CALCIUM CHLORIDE 9 ML/HR: 600; 310; 30; 20 INJECTION, SOLUTION INTRAVENOUS at 10:59

## 2024-02-29 RX ADMIN — SODIUM CHLORIDE 2000 MG: 900 INJECTION INTRAVENOUS at 12:30

## 2024-02-29 RX ADMIN — MEPIVACAINE HYDROCHLORIDE 4 ML: 15 INJECTION, SOLUTION EPIDURAL; INFILTRATION at 12:23

## 2024-02-29 RX ADMIN — Medication 100 MCG: at 13:07

## 2024-02-29 RX ADMIN — EPHEDRINE SULFATE 2.5 MG: 50 INJECTION INTRAVENOUS at 13:37

## 2024-02-29 RX ADMIN — Medication 50 MCG: at 13:12

## 2024-02-29 RX ADMIN — SODIUM CHLORIDE 2 G: 900 INJECTION INTRAVENOUS at 19:16

## 2024-02-29 RX ADMIN — TRAZODONE HYDROCHLORIDE 50 MG: 50 TABLET ORAL at 19:17

## 2024-02-29 NOTE — ANESTHESIA PROCEDURE NOTES
Peripheral Block      Patient reassessed immediately prior to procedure    Reason for block: at surgeon's request and post-op pain management  Performed by  CRNA/CAA: Ruslan Adams, CRNA  Assisted by: Lisa Garcia RN  Preanesthetic Checklist  Completed: patient identified, IV checked, site marked, risks and benefits discussed, surgical consent, monitors and equipment checked, pre-op evaluation and timeout performed  Prep:  Pt Position: supine  Sterile barriers:cap, gloves, mask, sterile barriers and washed/disinfected hands  Prep: ChloraPrep  Patient monitoring: blood pressure monitoring, continuous pulse oximetry and EKG  Procedure  Performed under: spinal  Guidance:ultrasound guided    ULTRASOUND INTERPRETATION.  Using ultrasound guidance a 20 G gauge needle was placed in close proximity to the nerve, at which point, under ultrasound guidance anesthetic was injected in the area of the nerve and spread of the anesthesia was seen on ultrasound in close proximity thereto.  There were no abnormalities seen on ultrasound; a digital image was taken; and the patient tolerated the procedure with no complications. Images:still images obtained, printed/placed on chart    Block Type:adductor canal block  Injection Technique:catheter  Needle Type:Tuohy and echogenic  Needle Gauge:18 G  Resistance on Injection: none  Catheter Size:20 G (20g)  Cath Depth at skin: 9 cm    Medications Used: bupivacaine PF (MARCAINE) 0.25 % injection - Injection   15 mL - 2/29/2024 1:59:00 PM      Post Assessment  Injection Assessment: negative aspiration for heme, incremental injection and no paresthesia on injection  Patient Tolerance:comfortable throughout block  Complications:no  Additional Notes  CATHETER   A high-frequency linear transducer, with sterile cover, was placed on the anterior mid-thigh (between the anterior superior iliac spine and patella). The transducer was then moved medially to identify the Sartorius muscle (Betty),  "Vastus Medialis muscle (VMM), Superficial Femoral Artery (SFA) and Vein. The transducer was then moved cephalad or caudad to position the SFA in the middle of the Betty. The insertion site was prepped and draped in sterile fashion. Skin and cutaneous tissue was infiltrated with 2-5 ml of 1% Lidocaine. Using ultrasound-guidance, an 18-gauge [employer] (MXD[employer]D)iplex Ultra 360 Touhy needle was advanced in plane from lateral to medial. Preservative-free normal saline was utilized for hydro-dissection of tissue, advancement of Touhy, and to confirm needle placement below the fascial plane of the Betty where the Nerve to the VMM is located. Local anesthetic (LA) 5 ml deposited here. The Touhy needle continues its path lateral to the SFA at the level of the Saphenous Nerve. The remainder of the LA was deposited at the 10-11 o'clock position of the SFA. This injection created a space between the Betty and the SFA. Aspiration every 5 ml to prevent intravascular injection. Injection was completed with negative aspiration of blood and negative intravascular injection. Injection pressures were normal with minimal resistance. A 20-gauge [employer] (MXD3D)iplex Echo catheter was placed through the needle and advance out the tip of the Touhy 3-5 cm anterior to the SFA. The Touhy needle was then removed, and final catheter position verified at the 12 o'clock position to the SFA. The catheter was secured in the usual fashion with skin glue, benzoin, steri-strips, CHG tegaderm and label noting \"Nerve Block Catheter\". Jerk tape applied at yellow connector and catheter connection.             "

## 2024-02-29 NOTE — PLAN OF CARE
Goal Outcome Evaluation:  Plan of Care Reviewed With: patient, spouse        Progress: no change  Outcome Evaluation: Pt amb 50' with FWW and Min Ax2. Multiple LOB noted. No knee buckling observed. Activity limited by dizziness with recorded hypotension (96/56). RN notified. HEP and precautions reviewed with pt. Frequent ambulation encouraged. Recommend d/c home with assist and HHPT when medically appropriate. Pt would benefit from additional gait training tomorrow prior to d/c.      Anticipated Discharge Disposition (PT): home with assist, home with home health

## 2024-02-29 NOTE — H&P
"Pre-Op H&P  Ro Bonds  [id number]  1955    Office note from 11 [id number] 2023 is attached for reference.        Associated Documents  Scan on 2/28/2024 0910 by [employer], [place]: RIGHT KNEE PROBLEM FOLLOW UP VISIT, [facility], 11/22/2023            Chief complaint: \" I am here for a right total knee replacement.\"    HPI:    Patient is a 68 y.o.female who presents with primary osteoarthritis of right knee.  She presents today for scheduled surgery and anticipates a right total knee arthroplasty with Cori robot-RIGHT.  Patient reports onset of significant right knee pain approximately 2 years ago following a fall.  She reports prior to this she did have some mild knee pain.  Patient reports right knee pain is localized to the right knee joint which is exacerbated by standing walking and weightbearing.  Conservative treatment methods have not provided adequate results and therefore she seeks surgical intervention.  She denies taking any anticoagulant or antiplatelet medications.    Review of Systems:  General ROS: negative for chills, fever or skin lesions;  No changes since last office visit.  Neg for recent sick exposure  Cardiovascular ROS: no chest pain or dyspnea on exertion  Respiratory ROS: no cough, shortness of breath, or wheezing    Allergies: Denies allergy to latex or contrast dye.  Allergies   Allergen Reactions    Amoxicillin Shortness Of Breath and Itching    Macrodantin [Nitrofurantoin] Hives     Drug-induced liver failure \"shut my liver down\"    Sulfa Antibiotics Shortness Of Breath and Swelling     Swelling of mouth, throat and tongue    Macrolides And Ketolides Hives     LIVER FAILURE    Estradiol Other (See Comments)     Severe abdominal pains    Ibuprofen Hives    New Skin Hives     Skin glue or dermabond    Progestins Nausea And Vomiting       Home Meds:    No current facility-administered medications on file prior to encounter.     Current Outpatient Medications on " File Prior to Encounter   Medication Sig Dispense Refill    albuterol sulfate  (90 Base) MCG/ACT inhaler Inhale 2 puffs Every 4 (Four) Hours As Needed for Wheezing or Shortness of Air.      alendronate (FOSAMAX) 70 MG tablet Take 1 tablet by mouth 1 (One) Time Per Week. Every Sunday      atorvastatin (LIPITOR) 20 MG tablet Take 1 tablet by mouth Every Morning.      EPINEPHrine (EPIPEN) 0.3 MG/0.3ML solution auto-injector injection Inject  into the appropriate muscle as directed by prescriber 1 (One) Time.      furosemide (LASIX) 40 MG tablet Take 1 tablet by mouth Every Morning.      levothyroxine (SYNTHROID, LEVOTHROID) 88 MCG tablet Take 1 tablet by mouth Every Morning.      Loratadine 10 MG capsule Take 1 capsule by mouth Daily As Needed (ALLERGIES).      meloxicam (MOBIC) 15 MG tablet Take 1 tablet by mouth Every Morning.      metoprolol tartrate (LOPRESSOR) 25 MG tablet Take 1 tablet by mouth 2 (Two) Times a Day. NEEDS APPT FOR FUTURE REFILLS 180 tablet 1    montelukast (SINGULAIR) 10 MG tablet Take 1 tablet by mouth Every Night.      spironolactone (ALDACTONE) 25 MG tablet Take 1 tablet by mouth Every Morning.      traZODone (DESYREL) 50 MG tablet Take 1 tablet by mouth Every Night.      vitamin D (ERGOCALCIFEROL) 1.25 MG (49212 UT) capsule capsule Take 1 capsule by mouth Every 7 (Seven) Days. SUNDAYS         PMH:   Past Medical History:   Diagnosis Date    Anxiety and depression     Arthritis     Asthma     Diabetes mellitus     Disease of thyroid gland     Elevated cholesterol     Hyperlipidemia     Hypertension     MRSA infection     HX    Posttraumatic seroma     after Abdominoplasty    Sleep apnea     NOT USING CPAP    Status post incision and drainage, debridement, closure, left shoulder incision 11/29/2022     PSH:    Past Surgical History:   Procedure Laterality Date    ABDOMINOPLASTY      with excesss skin removal    BREAST EXCISIONAL BIOPSY Bilateral     SCARS GONE AFTER BREAST REDUCTION     CARDIAC CATHETERIZATION      no interventions     SECTION      x2    CHOLECYSTECTOMY      COLONOSCOPY      EAR TUBES      GASTRIC BYPASS      HYSTERECTOMY      AGE 40 complete    INCISION AND DRAINAGE ARM Left 2022    Procedure: Incision and drainage superficial wound dehiscence with debridement, irrigation and closure;  Surgeon: Oneil Jones MD;  Location: [facility] OR;  Service: Orthopedics;  Laterality: Left;    REDUCTION MAMMAPLASTY Bilateral     TONSILLECTOMY      TOTAL SHOULDER ARTHROPLASTY Left 10/31/2022    Procedure: TOTAL SHOULDER ARTHROPLASTY, BICEPS TENODESIS - LEFT;  Surgeon: Oneil Jones MD;  Location: [facility] OR;  Service: Orthopedics;  Laterality: Left;       Immunization History:  Influenza: Yes  Pneumococcal: Yes  Tetanus: No    Social History:   Tobacco:   Social History     Tobacco Use   Smoking Status Never   Smokeless Tobacco Never      Alcohol:     Social History     Substance and Sexual Activity   Alcohol Use Yes    Alcohol/week: 1.0 standard drink of alcohol    Types: 1 Shots of liquor per week    Comment: occas       Vitals:           LMP 1995 (Exact Date) Comment: LMP ~ AGE 40    Physical Exam:  General Appearance:    Alert, cooperative, no distress, appears stated age   Head:    Normocephalic, without obvious abnormality, atraumatic   Lungs:     Clear to auscultation bilaterally, respirations unlabored    Heart:   Regular rate and rhythm, S1 and S2 normal, no murmur, rub    or gallop    Abdomen:    Soft, nontender.  +bowel sounds   Breast Exam:    deferred   Genitalia:    deferred   Extremities:   Extremities normal, atraumatic, no cyanosis or edema   Skin:   Skin color, texture, turgor normal, no rashes or lesions   Neurologic:   Grossly intact   Results Review  LABS:  Lab Results   Component Value Date    WBC 5.56 2024    HGB 13.1 2024    HCT 41.6 2024    MCV 84.7 2024     2024    NEUTROABS 2.93 2023     GLUCOSE 132 (H) 02/16/2024    BUN 14 02/16/2024    CREATININE 0.78 02/16/2024     02/16/2024    K 4.4 02/16/2024     02/16/2024    CO2 27.0 02/16/2024    CALCIUM 8.7 02/16/2024    ALBUMIN 4.3 02/14/2023    AST 16 02/14/2023    ALT 8 02/14/2023    BILITOT 1.0 02/14/2023       RADIOLOGY:  Peripheral Block    Result Date: 2/29/2024  Obey White CRNA     2/29/2024  7:03 AM Peripheral Block Patient reassessed immediately prior to procedure Reason for block: at surgeon's request and post-op pain management Performed by CRNA/CAA: Ruslan Adams CRNA Assisted by: Lisa Garcia RN Preanesthetic Checklist Completed: patient identified, IV checked, site marked, risks and benefits discussed, surgical consent, monitors and equipment checked, pre-op evaluation and timeout performed Prep: Pt Position: supine Sterile barriers:cap, gloves, mask, sterile barriers and washed/disinfected hands Prep: ChloraPrep Patient monitoring: blood pressure monitoring, continuous pulse oximetry and EKG Procedure Performed under: spinal Guidance:ultrasound guided ULTRASOUND INTERPRETATION.  Using ultrasound guidance a 20 G gauge needle was placed in close proximity to the nerve, at which point, under ultrasound guidance anesthetic was injected in the area of the nerve and spread of the anesthesia was seen on ultrasound in close proximity thereto.  There were no abnormalities seen on ultrasound; a digital image was taken; and the patient tolerated the procedure with no complications. Images:still images obtained, printed/placed on chart Block Type:adductor canal block Injection Technique:catheter Needle Type:Tuohy and echogenic Needle Gauge:18 G Resistance on Injection: none Catheter Size:20 G (20g) Post Assessment Injection Assessment: negative aspiration for heme, incremental injection and no paresthesia on injection Patient Tolerance:comfortable throughout block Complications:no Additional Notes CATHETER A high-frequency linear  "transducer, with sterile cover, was placed on the anterior mid-thigh (between the anterior superior iliac spine and patella). The transducer was then moved medially to identify the Sartorius muscle (Betty), Vastus Medialis muscle (VMM), Superficial Femoral Artery (SFA) and Vein. The transducer was then moved cephalad or caudad to position the SFA in the middle of the Betty. The insertion site was prepped and draped in sterile fashion. Skin and cutaneous tissue was infiltrated with 2-5 ml of 1% Lidocaine. Using ultrasound-guidance, an 18-gauge Contiplex Ultra 360 Touhy needle was advanced in plane from lateral to medial. Preservative-free normal saline was utilized for hydro-dissection of tissue, advancement of Touhy, and to confirm needle placement below the fascial plane of the Betty where the Nerve to the VMM is located. Local anesthetic (LA) 5 ml deposited here. The Touhy needle continues its path lateral to the SFA at the level of the Saphenous Nerve. The remainder of the LA was deposited at the 10-11 o'clock position of the SFA. This injection created a space between the Betty and the SFA. Aspiration every 5 ml to prevent intravascular injection. Injection was completed with negative aspiration of blood and negative intravascular injection. Injection pressures were normal with minimal resistance. A 20-gauge Buzztalaiplex Echo catheter was placed through the needle and advance out the tip of the Touhy 3-5 cm anterior to the SFA. The Touhy needle was then removed, and final catheter position verified at the 12 o'clock position to the SFA. The catheter was secured in the usual fashion with skin glue, benzoin, steri-strips, CHG tegaderm and label noting \"Nerve Block Catheter\". Jerk tape applied at yellow connector and catheter connection.       I reviewed the patient's new clinical results.    Cancer Staging (if applicable)  Cancer Patient: __ yes __no __unknown; If yes, clinical stage T:__ N:__M:__, stage group or " __N/A    Impression: Patient presents with osteoarthritis of right knee.    Plan: Dr. Machado will perform a right total knee arthroplasty with Cori robot-RIGHT.       Luis F Mei PA-C   02/29/24   10:51 AM EST

## 2024-02-29 NOTE — THERAPY EVALUATION
Patient Name: Ro Bonds  : 1955    MRN: [id number]                              Today's Date: 2024       Admit Date: 2024    Visit Dx: No diagnosis found.  Patient Active Problem List   Diagnosis    Paroxysmal atrial flutter    Essential hypertension    Hyperlipidemia LDL goal <100    Type 2 diabetes mellitus without complication, without long-term current use of insulin    JOSEPH on CPAP    Status post total shoulder arthroplasty, left    Obesity    Postoperative pain    Wound dehiscence, surgical    Anxiety and depression    Status post incision and drainage, debridement, closure, left shoulder incision    Primary localized osteoarthritis of right knee    Status post right knee replacement     Past Medical History:   Diagnosis Date    Anxiety and depression     Arthritis     Asthma     Diabetes mellitus     Disease of thyroid gland     Elevated cholesterol     Hyperlipidemia     Hypertension     MRSA infection     HX    Posttraumatic seroma     after Abdominoplasty    Sleep apnea     NOT USING CPAP    Status post incision and drainage, debridement, closure, left shoulder incision 2022     Past Surgical History:   Procedure Laterality Date    ABDOMINOPLASTY      with excesss skin removal    BREAST EXCISIONAL BIOPSY Bilateral     SCARS GONE AFTER BREAST REDUCTION    CARDIAC CATHETERIZATION      no interventions     SECTION      x2    CHOLECYSTECTOMY      COLONOSCOPY      EAR TUBES      GASTRIC BYPASS      HYSTERECTOMY      AGE 40 complete    INCISION AND DRAINAGE ARM Left 2022    Procedure: Incision and drainage superficial wound dehiscence with debridement, irrigation and closure;  Surgeon: Oneil Jones MD;  Location: [facility];  Service: Orthopedics;  Laterality: Left;    REDUCTION MAMMAPLASTY Bilateral     TONSILLECTOMY      TOTAL SHOULDER ARTHROPLASTY Left 10/31/2022    Procedure: TOTAL SHOULDER ARTHROPLASTY, BICEPS TENODESIS - LEFT;  Surgeon: Robert  Oneil Meza MD;  Location: [facility];  Service: Orthopedics;  Laterality: Left;      General Information       Row Name 02/29/24 1759          Physical Therapy Time and Intention    Document Type evaluation  -     Mode of Treatment physical therapy  -       Row Name 02/29/24 1759          General Information    Patient Profile Reviewed yes  -     Prior Level of Function min assist:;all household mobility;community mobility;gait;transfer;bed mobility;ADL's  -     Existing Precautions/Restrictions fall;other (see comments)  adductor canal nerve cath  -     Barriers to Rehab none identified  -       Row Name 02/29/24 1759          Living Environment    People in Home child(cheli), adult;spouse;grandchild(cheli)  -       Row Name 02/29/24 1759          Home Main Entrance    Number of Stairs, Main Entrance other (see comments)  thresholds  -       Row Name 02/29/24 1759          Stairs Within Home, Primary    Number of Stairs, Within Home, Primary none  -       Row Name 02/29/24 1759          Cognition    Orientation Status (Cognition) oriented x 3  -       Row Name 02/29/24 1759          Safety Issues, Functional Mobility    Safety Issues Affecting Function (Mobility) awareness of need for assistance;insight into deficits/self-awareness  -     Impairments Affecting Function (Mobility) balance;endurance/activity tolerance;pain;range of motion (ROM);strength  -               User Key  (r) = Recorded By, (t) = Taken By, (c) = Cosigned By      Initials Name Provider Type     Isaura Lyles PT Physical Therapist                   Mobility       Row Name 02/29/24 1800          Bed Mobility    Bed Mobility supine-sit  -     Supine-Sit [place] (Bed Mobility) standby assist  -     Comment, (Bed Mobility) VC for line management  -       Row Name 02/29/24 1800          Transfers    Comment, (Transfers) VC for hand placement and encouraged weight shifting onto RLE  -       Row Name 02/29/24 1800           Sit-Stand Transfer    Sit-Stand [place] (Transfers) contact guard;nonverbal cues (demo/gesture);verbal cues;2 person assist  -     Assistive Device (Sit-Stand Transfers) walker, front-wheeled  -       Row Name 02/29/24 1800          Gait/Stairs (Locomotion)    [place] Level (Gait) nonverbal cues (demo/gesture);verbal cues;2 person assist;minimum assist (75% patient effort)  -     Assistive Device (Gait) walker, front-wheeled  -     Patient was able to Ambulate yes  -     Distance in Feet (Gait) 50  -HW     Deviations/Abnormal Patterns (Gait) bilateral deviations;base of support, wide;weight shifting decreased;stride length decreased;gait speed decreased  -     Bilateral Gait Deviations forward flexed posture;heel strike decreased  -     Comment, (Gait/Stairs) Pt amb to/from bathroom with step-through gait pattern. VC for increased weight shifting onto RLE, upright posture, R knee extension in stance phase, and increased stride length. Multiple moderate LOB noted especially in posterior direction. Increased assistance required for steadiness. No knee buckling observed. Activity limited by reports of dizziness. BP dropped to 96/57. RN notified.  -       Row Name 02/29/24 1800          Mobility    Extremity Weight-bearing Status right lower extremity  -     Right Lower Extremity (Weight-bearing Status) weight-bearing as tolerated (WBAT)  -               User Key  (r) = Recorded By, (t) = Taken By, (c) = Cosigned By      Initials Name Provider Type     Isaura Lyles PT Physical Therapist                   Obj/Interventions       Row Name 02/29/24 1805          Range of Motion Comprehensive    General Range of Motion lower extremity range of motion deficits identified  -     Comment, General Range of Motion Surgical knee ROM: 15-73  -       Row Name 02/29/24 1805          Strength Comprehensive (MMT)    General Manual Muscle Testing (MMT) Assessment lower extremity strength  deficits identified  -     Comment, General Manual Muscle Testing (MMT) Assessment Pt able to perform B active ankle DF and SLR  -       Row Name 02/29/24 1805          Motor Skills    Therapeutic Exercise knee;ankle  -[facility] Name 02/29/24 1805          Knee (Therapeutic Exercise)    Knee (Therapeutic Exercise) isometric exercises;strengthening exercise  -     Knee Isometrics (Therapeutic Exercise) quad sets;right;10 repetitions  -     Knee Strengthening (Therapeutic Exercise) SLR (straight leg raise);SAQ (short arc quad);LAQ (long arc quad);heel slides;right;3 repetitions  -       Row Name 02/29/24 1805          Ankle (Therapeutic Exercise)    Ankle (Therapeutic Exercise) AROM (active range of motion)  -     Ankle AROM (Therapeutic Exercise) bilateral;dorsiflexion;plantarflexion;10 repetitions  -[facility] Name 02/29/24 1805          Balance    Balance Assessment sitting static balance;sitting dynamic balance;sit to stand dynamic balance;standing static balance;standing dynamic balance  -     Static Sitting Balance standby assist  -     Dynamic Sitting Balance standby assist  -     Position, Sitting Balance sitting edge of bed  -     Static Standing Balance contact guard  -     Dynamic Standing Balance minimal assist  -     Position/Device Used, Standing Balance supported;walker, rolling  -     Balance Interventions sitting;standing;sit to stand;occupation based/functional task  -       Row Name 02/29/24 1805          Sensory Assessment (Somatosensory)    Sensory Assessment (Somatosensory) LE sensation intact  -               User Key  (r) = Recorded By, (t) = Taken By, (c) = Cosigned By      Initials Name Provider Type     Isaura Lyles PT Physical Therapist                   Goals/Plan       [facility] Name 02/29/24 1809          Bed Mobility Goal 1 (PT)    Activity/Assistive Device (Bed Mobility Goal 1, PT) sit to supine/supine to sit  -     [place] Level/Cues Needed (Bed  Mobility Goal 1, PT) modified independence  -HW     Time Frame (Bed Mobility Goal 1, PT) long term goal (LTG);3 days  -       Row Name 02/29/24 1809          Transfer Goal 1 (PT)    Activity/Assistive Device (Transfer Goal 1, PT) sit-to-stand/stand-to-sit  -HW     [place] Level/Cues Needed (Transfer Goal 1, PT) modified independence  -HW     Time Frame (Transfer Goal 1, PT) long term goal (LTG);3 days  -       Row Name 02/29/24 1809          Gait Training Goal 1 (PT)    Activity/Assistive Device (Gait Training Goal 1, PT) gait (walking locomotion)  -HW     [place] Level (Gait Training Goal 1, PT) modified independence  -HW     Distance (Gait Training Goal 1, PT) 150  -HW     Time Frame (Gait Training Goal 1, PT) long term goal (LTG);3 days  -[facility] Name 02/29/24 1809          ROM Goal 1 (PT)    ROM Goal 1 (PT) Surgical Knee ROM: 0-90  -HW     Time Frame (ROM Goal 1, PT) long-term goal (LTG);3 days  -[facility] Name 02/29/24 1809          Therapy Assessment/Plan (PT)    Planned Therapy Interventions (PT) balance training;bed mobility training;gait training;home exercise program;ROM (range of motion);patient/family education;stair training;strengthening;stretching;transfer training  -               User Key  (r) = Recorded By, (t) = Taken By, (c) = Cosigned By      Initials Name Provider Type     Isaura Lyles, HANS Physical Therapist                   Clinical Impression       [facility] Name 02/29/24 1806          Pain    Pretreatment Pain Rating 4/10  -     Posttreatment Pain Rating 4/10  -     Pain Location - Side/Orientation Right  -     Pain Location generalized  -     Pain Location - knee  -     Pain Intervention(s) Ambulation/increased activity;Repositioned;Elevated;Cold applied  -       Row Name 02/29/24 1806          Plan of Care Review    Plan of Care Reviewed With patient;spouse  -     Progress no change  -     Outcome Evaluation Pt amb 50' with FWW and Min Ax2. Multiple LOB  noted. No knee buckling observed. Activity limited by dizziness with recorded hypotension (96/56). RN notified. HEP and precautions reviewed with pt. Frequent ambulation encouraged. Recommend d/c home with assist and HHPT when medically appropriate. Pt would benefit from additional gait training tomorrow prior to d/c.  -       Row Name 02/29/24 1806          Therapy Assessment/Plan (PT)    Rehab Potential (PT) good, to achieve stated therapy goals  -     Criteria for Skilled Interventions Met (PT) yes;meets criteria;skilled treatment is necessary  -     Therapy Frequency (PT) 2 times/day  -       Row Name 02/29/24 1806          Vital Signs    Pre Systolic BP Rehab 113  -HW     Pre Treatment Diastolic BP 76  -HW     Intra Systolic BP Rehab 96  -HW     Intra Treatment Diastolic BP 56  -HW     Pre Patient Position Supine  -     Intra Patient Position Standing  -     Post Patient Position Sitting  -       Row Name 02/29/24 1806          Positioning and Restraints    Pre-Treatment Position in bed  -     Post Treatment Position chair  -     In Chair notified nsg;reclined;sitting;call light within reach;encouraged to call for assist;exit alarm on;with family/caregiver;legs elevated;compression device  -               User Key  (r) = Recorded By, (t) = Taken By, (c) = Cosigned By      Initials Name Provider Type     Isaura Lyles, HANS Physical Therapist                   Outcome Measures       Row Name 02/29/24 1809 02/29/24 1628       How much help from another person do you currently need...    Turning from your back to your side while in flat bed without using bedrails? 3  -HW 3  -TB    Moving from lying on back to sitting on the side of a flat bed without bedrails? 3  -HW 3  -TB    Moving to and from a bed to a chair (including a wheelchair)? 3  -HW 2  -TB    Standing up from a chair using your arms (e.g., wheelchair, bedside chair)? 3  -HW 2  -TB    Climbing 3-5 steps with a railing? 2  -HW 2  -TB     To walk in hospital room? 3  -HW 2  -TB    AM-PAC 6 Clicks Score (PT) 17  -HW 14  -TB    Highest Level of Mobility Goal 5 --> Static standing  - 4 --> Transfer to chair/commode  -      Row Name 02/29/24 1809          PADD    Diagnosis 1  -     Gender 1  -     Age Group 1  -     Gait Distance 0  -     Assist Level 0  -     Home Support 3  -     PADD Score 6  -     Patient Preference home with home health  -     Prediction by PADD Score extended rehabilitation  -       Row Name 02/29/24 1809          Functional Assessment    Outcome Measure Options AM-PAC 6 Clicks Basic Mobility (PT);PADD  -               User Key  (r) = Recorded By, (t) = Taken By, (c) = Cosigned By      Initials Name Provider Type     Gavin Mendoza, RN Registered Nurse     Isaura Lyles, PT Physical Therapist                                 Physical Therapy Education       Title: PT OT SLP Therapies (In Progress)       Topic: Physical Therapy (Done)       Point: Mobility training (Done)       Learning Progress Summary             Patient Acceptance, E,D, VU,NR by  at 2/29/2024 1810                         Point: Home exercise program (Done)       Learning Progress Summary             Patient Acceptance, E,D, VU,NR by  at 2/29/2024 1810                         Point: Body mechanics (Done)       Learning Progress Summary             Patient Acceptance, E,D, VU,NR by  at 2/29/2024 1810                         Point: Precautions (Done)       Learning Progress Summary             Patient Acceptance, E,D, VU,NR by  at 2/29/2024 1810                                         User Key       Initials Effective Dates Name Provider Type Discipline     12/15/23 -  Isaura Lyles, HANS Physical Therapist PT                  PT Recommendation and Plan  Planned Therapy Interventions (PT): balance training, bed mobility training, gait training, home exercise program, ROM (range of motion), patient/family education, stair  training, strengthening, stretching, transfer training  Plan of Care Reviewed With: patient, spouse  Progress: no change  Outcome Evaluation: Pt amb 50' with FWW and Min Ax2. Multiple LOB noted. No knee buckling observed. Activity limited by dizziness with recorded hypotension (96/56). RN notified. HEP and precautions reviewed with pt. Frequent ambulation encouraged. Recommend d/c home with assist and HHPT when medically appropriate. Pt would benefit from additional gait training tomorrow prior to d/c.     Time Calculation:   PT Evaluation Complexity  History, PT Evaluation Complexity: 1-2 personal factors and/or comorbidities  Examination of Body Systems (PT Eval Complexity): total of 3 or more elements  Clinical Presentation (PT Evaluation Complexity): evolving  Clinical Decision Making (PT Evaluation Complexity): moderate complexity  Overall Complexity (PT Evaluation Complexity): moderate complexity     PT Charges       Row Name 02/29/24 1810             Time Calculation    Start Time 1738  -HW      PT Received On 02/29/24  -HW      PT Goal Re-Cert Due Date 03/10/24  -HW         Timed Charges    27229 - Gait Training Minutes  5  -HW      19347 - PT Therapeutic Activity Minutes 4  -HW         Untimed Charges    PT Eval/Re-eval Minutes 43  -HW         Total Minutes    Timed Charges Total Minutes 9  -HW      Untimed Charges Total Minutes 43  -HW       Total Minutes 52  -HW                User Key  (r) = Recorded By, (t) = Taken By, (c) = Cosigned By      Initials Name Provider Type     Isaura Lyles, HANS Physical Therapist                  Therapy Charges for Today       Code Description Service Date Service Provider Modifiers Qty    25426366355 HC GAIT TRAINING EA 15 MIN 2/29/2024 Isaura Lyles, PT GP 1    79602828459 HC PT EVAL MOD COMPLEXITY 3 2/29/2024 Isaura Lyles, PT GP 1    70443108265 HC PT THER SUPP EA 15 MIN 2/29/2024 Isaura Lyles, PT GP 3            PT G-Codes  Outcome Measure Options: AM-PAC 6 Clicks  Basic Mobility (PT), PADD  AM-PAC 6 Clicks Score (PT): 17  PT Discharge Summary  Anticipated Discharge Disposition (PT): home with assist, home with home health    Isaura Lyles, PT  2/29/2024

## 2024-02-29 NOTE — BRIEF OP NOTE
TOTAL KNEE ARTHROPLASTY WITH CORI ROBOT  Progress Note    Ro Bonds  2/29/2024    Pre-op Diagnosis:   Right knee osteoarthritis       Post-Op Diagnosis Codes:     * Primary localized osteoarthritis of right knee [M17.11]    Procedure/CPT® Codes:  HI ARTHRP KNE CONDYLE&PLATU MEDIAL&LAT COMPARTMENTS [15154]  HI CPTR-ASST SURGICAL NAVIGATION IMAGE-LESS [29884]      Procedure(s):  TOTAL KNEE ARTHROPLASTY WITH CORI ROBOT - RIGHT    Surgical Approach: Knee Medial Parapatellar            Surgeon(s):  Henry Machado MD    Assistant: GINA Koch    Anesthesia: Spinal with local and adductor canal catheter    Staff:   Circulator: Blair May RN; Gene Pierson RN  Scrub Person: Jacqueline Kraft; Conchita Nicholas; Estebna Kelly  Vendor Representative: Navarro Guzmán ([employer]); [place] Burt  Assistant: David Briceño RNFA  Assistant: David Briceño RNFA      Estimated Blood Loss: minimal    Urine Voided: * No values recorded between 2/29/2024 12:20 PM and 2/29/2024  1:47 PM *    Specimens:                None          Drains: * No LDAs found *    Findings: Right knee moderate tricompartmental degenerative changes with varus deformity        Complications: None    Implants: [employer] posterior stabilized total knee arthroplasty with a size 4 narrow femur, 2 tibia, 9 polyethylene and 26 thin patella    Tourniquet time: 60 minutes at 300 mmHg    Assistant: David Briceño RNFA  was responsible for performing the following activities: Retraction, assistance with implantation of components and closure and their skilled assistance was necessary for the success of this case.    Henry Machado MD     Date: 2/29/2024  Time: 13:56 EST

## 2024-02-29 NOTE — ANESTHESIA PROCEDURE NOTES
Spinal Block      Patient reassessed immediately prior to procedure    Patient location during procedure: OR  Start Time: 2/29/2024 12:23 PM  Stop Time: 2/29/2024 12:27 PM  Indication:at surgeon's request  Performed By  LUCILLE/ANJANA: Obey White, LUCILLESRNA: Theo Hudson SRNA  Preanesthetic Checklist  Completed: patient identified, IV checked, site marked, risks and benefits discussed, surgical consent, monitors and equipment checked, pre-op evaluation and timeout performed  Spinal Block Prep:  Patient Position:sitting  Sterile Tech:cap, gloves, sterile barriers and mask  Prep:Chloraprep  Patient Monitoring:blood pressure monitoring, continuous pulse oximetry and EKG    Spinal Block Procedure  Approach:midline  Guidance:landmark technique and palpation technique  Location:L4-L5  Needle Type:Quincke  Needle Gauge:22 G  Placement of Spinal needle event:cerebrospinal fluid aspirated  Paresthesia: no  Fluid Appearance:clear  Medications: Mepivacaine HCl (PF) (CARBOCAINE) 1.5 % injection - Injection   4 mL - 2/29/2024 12:23:00 PM   Post Assessment  Patient Tolerance:patient tolerated the procedure well with no apparent complications  Complications no  Additional Notes  Procedure:  Pt assisted to sitting position, with legs in position of comfort over side of bed.  Pt. instructed in optimal spine presentation, the spine was prepped/ Draped and the skin at insertion site was anesthetized with 1% Lidocaine 2 ml.  The spinal needle was then advanced until CSF flow was obtained and LA was injected:

## 2024-02-29 NOTE — OP NOTE
Preoperative diagnosis:Right knee end stage osteoarthritis    Postoperative diagnosis: Right knee end stage osteoarthritis    Operative procedure: Right total knee arthroplasty with Cori robot computer guidance    Surgeon: Dr. Jules Machado    Assistant: GINA Koch.  Necessary during the case for assistance with positioning of the patient, exposure, implantation of components and closure.  Necessary for the success and completion of the case.    Anesthesia: Spinal with local and adductor canal catheter    Estimated blood loss: Minimal    Surgical Approach: Knee Medial Parapatellar     Implants: [employer] posterior stabilized total knee arthroplasty size 4 narrow femur, 2 tibia, 26 thin patella, 9 polyethylene.    Tourniquet time: 60 minutes at 300 mmHg    Reasoning for added complexity code: Please note a 22 modifier will be added to this case due to the patient's obesity with a BMI of 38.  This added significant time and complexity to the case in order to obtain adequate exposure to perform the knee replacement.    Indications for procedure: Ro is a very pleasant 68-year-old female who has struggled with end-stage activity limiting right knee pain secondary to osteoarthritis.  X-rays in September revealed severe tricompartmental degenerative changes in a varus knee with complete loss of medial joint space and marginal osteophyte formation.  They have failed exhaustive conservative treatment measures including cortisone injections and physical therapy.  Minimal relief with the last cortisone injection in September.  She takes meloxicam for her knee pain.  History of a left shoulder replacement October 2022.  After undergoing a shared decision-making process they agreed to proceed with right total knee arthroplasty.  Surgical consent form was signed.    Description of procedure: The patient was seen in the preoperative holding area and the right knee was signed to confirm the correct operative site.  They  were seen by anesthesia.  They received Ancef 2 g IV prophylactic antibiotics within 1 hour of incision time.  They were brought back to the operating room.  Spinal was performed without difficulty and they were given sedation throughout the case.  Patient placed in the supine position and all bony prominences were well-padded. Nonsterile tourniquet was applied to the thigh.  The right lower extremity was prepped and draped in the usual sterile fashion and timeout was performed to confirm right total knee arthroplasty for patient Ro Bonds.    The right lower extremity was exsanguinated with an Esmarch.  Tourniquet was inflated to 300 mmHg.  With the knee flexed a midline incision was made with a 10 blade scalpel.  Adequate hemostasis maintained with Bovie electrocautery.  Full-thickness medial and lateral flaps were elevated.  Using a fresh 10 blade scalpel I made a medial parapatellar arthrotomy.  The patella was everted.  Patella fat pad and anterior femoral fat pads were excised.  Marginal osteophytes were removed.  Medial release was performed for this varus knee using Bovie electrocautery.  Z retractors were placed medially and laterally.    Guide pins for the CORI were placed in the medial tibia shaft and distal medial femur. Sensors were placed and data capture was performed per standard CORI computer guidance technique.  Femoral and tibial sizes were determined.    The distal femoral cut was then made with a elana at the previously selected depth and amount of valgus (3-5 degrees). The 4-in-1 cutting guide for the previously selected femur size of 4 was pinned in place at the appropriate amount of external rotation.  Anterior and posterior cuts were made as were the chamfer cuts.  Cut bone was removed.  We then turned our attention to the tibia.    The tibia was subluxed anteriorly. PCL retractor was placed as were medial and lateral Hohmann retractors.  The tibial cutting guide was then pinned in place  using CORI guidance for the proximal tibial cut. We then used the oscillating saw to make the proximal tibial cut and this cut was then checked with the CORI. Medial and lateral menisci were then excised as were posterior osteophytes.    Flexion and extension gaps were then checked and with a 9 mm block we achieved full extension and flexion with excellent alignment. The tibia was sized to a 2 tibial tray centered off the medial third of the tibial tubercle.  The tray was pinned in place.  We then impacted the tibial fins.  Size 4  trial femur was then placed and box cut was made with the reamer and punch. We trialed with a size 9 polyethylene, 4 femur and 2 tibia.  With these trial components in place we achieved full extension and flexion with excellent alignment and stable throughout.     We then turned our attention to the patella.  Patella was sized to 20 mm in thickness and we made a 7 mm patellar cut with the reciprocal saw.  Patella drill holes were made with the appropriate size guide.  A 26 thin trial button was placed and there was excellent tracking with the no thumbs technique.    Trial components were then removed.  Final components were opened on the back table.  Posterior capsule was injected with 20 mL of half percent ropivacaine and 5 mg of morphine.  Cement was mixed on the back table.  The knee was copiously irrigated with Pulsavac lavage.  The knee was thoroughly dried.  Cement was then applied to the tibia and final size 2 tibial tray was impacted in place and excess cement was removed.  Cement was applied to the femur and final size 4 narrow femur was impacted in place and excess cement removed.  We then placed a 9 mm polyethylene-this was seen to be fully seated in the tibial tray.  Knee was then placed in extension and we applied cement to the cut patellar surface and 26 thin patella was held in place with patellar clamp.  All excess cement was removed.  The knee was left in extension while  cement cured.  While the cement cured we removed the CORI tibial and femoral pins and sensors.  Tibial pin incisions were closed with 3-0 monocryl.    Once the cement was fully cured we irrigated the knee with diluted Betadine solution and Pulsavac lavage.  The knee was taken through full range of motion and seen to achieve full extension and flexion with excellent patellar tracking.    We then proceeded with closure.  The deep fascia was closed with a #1 Stratafix barbed suture.  Dermis was closed with 2-0 Vicryl.  Skin was closed with a running 3-0 Stratafix barbed subcuticular suture.    A sterile dressing was then applied with mesh dressing and glue.  We then applied 4x4's, ABDs, soft roll and a six-inch Ace bandage.    Tourniquet was deflated at 60 minutes.  Patient was transferred to recovery in stable condition.  All sponge and needle counts were correct ×2.  Patient received first dose of tranexamic acid just prior to incision and the second dose 5-10 minutes prior to letting down the tourniquet to minimize bleeding.    Postoperative plan:  Patient will be admitted to Dr. CUNNINGHAM for medical management  Mobilize starting today with PT/OT as tolerated  Start aspirin for DVT prophylaxis tomorrow   consult for home physical therapy and home equipment needs  Follow-up with me in 2-3 weeks.    Henry Machado MD  02/29/24  13:57 EST    CC: Dimas Kumari NP

## 2024-02-29 NOTE — H&P
"Patient Name: Ro Bonds  MRN: [id number]  : 1955  DOS: 2024    Attending: Henry Machado,*    Primary Care Provider: Dimas Kumari APRN      Chief complaint: Right knee Pain.    Subjective   Patient is a pleasant 68 y.o. female presented for scheduled surgery by Dr. Machado.    Per his note (Ro is a very pleasant 68-year-old female who has struggled with end-stage activity limiting right knee pain secondary to osteoarthritis.  X-rays in September revealed severe tricompartmental degenerative changes in a varus knee with complete loss of medial joint space and marginal osteophyte formation.  They have failed exhaustive conservative treatment measures including cortisone injections and physical therapy.  Minimal relief with the last cortisone injection in September.  She takes meloxicam for her knee pain.  History of a left shoulder replacement 2022.  After undergoing a shared decision-making process they agreed to proceed with right total knee arthroplasty.  Surgical consent form was signed.).    She is known to me from prior hospitalizations for shoulder surgeries.    She underwent right total knee arthroplasty under spinal anesthesia, tolerated surgery well, adductor canal nerve block catheter was placed by acute pain service postop.    Seen in her room postop, doing fairly well, no complains of nausea, vomiting, or shortness of breath.  She has no history of DVT or PE.    Reviewed with patient her past medical history and home medications.       Allergies   Allergen Reactions    Amoxicillin Shortness Of Breath and Itching    Macrodantin [Nitrofurantoin] Hives     Drug-induced liver failure \"shut my liver down\"    Sulfa Antibiotics Shortness Of Breath and Swelling     Swelling of mouth, throat and tongue    Macrolides And Ketolides Hives     LIVER FAILURE    Estradiol Rash     Severe abdominal pains    Ibuprofen Hives    New Skin Hives     Skin glue or dermabond    " Progestins Nausea And Vomiting     Medications Prior to Admission   Medication Sig Dispense Refill Last Dose    alendronate (FOSAMAX) 70 MG tablet Take 1 tablet by mouth 1 (One) Time Per Week. Every Sunday   Past Month    atorvastatin (LIPITOR) 20 MG tablet Take 1 tablet by mouth Every Morning.   Past Week    furosemide (LASIX) 40 MG tablet Take 1 tablet by mouth Every Morning.   Past Week    hydrOXYzine (ATARAX) 10 MG tablet Take 1-3 tablets by mouth Every 4 (Four) Hours As Needed for Anxiety (SLEEP).   2/28/2024    levothyroxine (SYNTHROID, LEVOTHROID) 88 MCG tablet Take 1 tablet by mouth Every Morning.   Past Week    Loratadine 10 MG capsule Take 1 capsule by mouth Daily As Needed (ALLERGIES).   Past Week    meloxicam (MOBIC) 15 MG tablet Take 1 tablet by mouth Every Morning.   Past Week    metoprolol tartrate (LOPRESSOR) 25 MG tablet Take 1 tablet by mouth 2 (Two) Times a Day. NEEDS APPT FOR FUTURE REFILLS 180 tablet 1 2/28/2024 at 2200    montelukast (SINGULAIR) 10 MG tablet Take 1 tablet by mouth Every Night.   2/28/2024    potassium chloride ER (K-TAB) 20 MEQ tablet controlled-release ER tablet Take 1 tablet by mouth Every Morning.   Past Week    spironolactone (ALDACTONE) 25 MG tablet Take 1 tablet by mouth Every Morning.   Past Week    traZODone (DESYREL) 50 MG tablet Take 1 tablet by mouth Every Night.   2/28/2024    vitamin D (ERGOCALCIFEROL) 1.25 MG (78537 UT) capsule capsule Take 1 capsule by mouth Every 7 (Seven) Days. SUNDAYS   Past Month    albuterol sulfate  (90 Base) MCG/ACT inhaler Inhale 2 puffs Every 4 (Four) Hours As Needed for Wheezing or Shortness of Air.   More than a month    EPINEPHrine (EPIPEN) 0.3 MG/0.3ML solution auto-injector injection Inject  into the appropriate muscle as directed by prescriber 1 (One) Time.          Past Medical History:   Diagnosis Date    Anxiety and depression     Arthritis     Asthma     Diabetes mellitus     Disease of thyroid gland     Elevated  cholesterol     Hyperlipidemia     Hypertension     MRSA infection     HX    Posttraumatic seroma     after Abdominoplasty    Sleep apnea     NOT USING CPAP    Status post incision and drainage, debridement, closure, left shoulder incision 2022     Past Surgical History:   Procedure Laterality Date    ABDOMINOPLASTY      with excesss skin removal    BREAST EXCISIONAL BIOPSY Bilateral     SCARS GONE AFTER BREAST REDUCTION    CARDIAC CATHETERIZATION      no interventions     SECTION      x2    CHOLECYSTECTOMY      COLONOSCOPY      EAR TUBES      GASTRIC BYPASS      HYSTERECTOMY      AGE 40 complete    INCISION AND DRAINAGE ARM Left 2022    Procedure: Incision and drainage superficial wound dehiscence with debridement, irrigation and closure;  Surgeon: Oneil Jones MD;  Location:  [facility];  Service: Orthopedics;  Laterality: Left;    REDUCTION MAMMAPLASTY Bilateral     TONSILLECTOMY      TOTAL SHOULDER ARTHROPLASTY Left 10/31/2022    Procedure: TOTAL SHOULDER ARTHROPLASTY, BICEPS TENODESIS - LEFT;  Surgeon: Oneil Jones MD;  Location:  [facility];  Service: Orthopedics;  Laterality: Left;     Family History   Adopted: Yes   Problem Relation Age of Onset    No Known Problems Sister     No Known Problems Brother     No Known Problems Maternal Aunt     No Known Problems Paternal Aunt     No Known Problems Maternal Grandmother     No Known Problems Paternal Grandmother     No Known Problems Daughter     No Known Problems Son      Social History     Tobacco Use    Smoking status: Never    Smokeless tobacco: Never   Vaping Use    Vaping Use: Never used   Substance Use Topics    Alcohol use: Yes     Alcohol/week: 1.0 standard drink of alcohol     Types: 1 Shots of liquor per week     Comment: occas    Drug use: Never       Review of Systems  Pertinent items are noted in HPI    Vital Signs  /71 (BP Location: Right arm, Patient Position: Lying)   Pulse 64   Temp 97.2 °F  "(36.2 °C) (Oral)   Resp 14   Ht 149.9 cm (59\")   Wt 85.7 kg (189 lb)   LMP 05/31/1995 (Exact Date) Comment: LMP ~ AGE 40  SpO2 98%   BMI 38.17 kg/m²     Physical Exam:    General Appearance:    Alert, cooperative, in no acute distress   Head:    Normocephalic, without obvious abnormality, atraumatic   Eyes:            Lids and lashes normal, conjunctivae and sclerae normal, no   icterus, no pallor, corneas clear    Ears:    Ears appear intact with no abnormalities noted   Throat:   No oral lesions, no thrush, oral mucosa moist   Neck:   No adenopathy, supple, trachea midline, no thyromegaly         Lungs:     Clear to auscultation,respirations regular, even and                   unlabored    Heart:    Regular rhythm and normal rate, normal S1 and S2, no       murmur, no gallop   Abdomen:     Normal bowel sounds, no masses, no organomegaly, soft        non-tender, non-distended, no guarding, no rebound                 tenderness   Genitalia:    Deferred   Extremities: Right LE, CDI dressing on knee, PNB cath present.    Pulses:   Pulses palpable and equal bilaterally   Skin:   No bleeding, bruising or rash   Neurologic:   Cranial nerves 2 - 12 grossly intact, still with decreased movement and sensation in the legs due to spinal anesthesia effects when seen      I reviewed the patient's new clinical results.             Invalid input(s): \"NEUTOPHILPCT\"  Results from last 7 days   Lab Units 02/29/24  1103   POTASSIUM mmol/L 3.9     Lab Results   Component Value Date    HGBA1C 7.00 (H) 02/16/2024      Latest Reference Range & Units 02/16/24 10:32   Sodium 136 - 145 mmol/L 143   Potassium 3.5 - 5.2 mmol/L 4.4   Chloride 98 - 107 mmol/L 106   CO2 22.0 - 29.0 mmol/L 27.0   Anion Gap 5.0 - 15.0 mmol/L 10.0   BUN 8 - 23 mg/dL 14   Creatinine 0.57 - 1.00 mg/dL 0.78   BUN/Creatinine Ratio 7.0 - 25.0  17.9   eGFR >60.0 mL/min/1.73 81.8   Glucose 65 - 99 mg/dL 132 (H)   Calcium 8.6 - 10.5 mg/dL 8.7   (H): Data is " abnormally high     Latest Reference Range & Units 02/16/24 10:32   WBC 3.40 - 10.80 10*3/mm3 5.56   RBC 3.77 - 5.28 10*6/mm3 4.91   Hemoglobin 12.0 - 15.9 g/dL 13.1   Hematocrit 34.0 - 46.6 % 41.6   Platelets 140 - 450 10*3/mm3 322   RDW 12.3 - 15.4 % 13.1   MCV 79.0 - 97.0 fL 84.7   MCH 26.6 - 33.0 pg 26.7   MCHC 31.5 - 35.7 g/dL 31.5   MPV 6.0 - 12.0 fL 9.8   RDW-SD 37.0 - 54.0 fl 40.6     Assessment and Plan:       Status post right knee replacement    Essential hypertension    Hyperlipidemia LDL goal <100    Type 2 diabetes mellitus without complication, without long-term current use of insulin    Obesity    Anxiety and depression    Primary localized osteoarthritis of right knee  Hypothyroidism    Plan  1. PT/OT,  Weight bearing as tolerated right LE  2. Pain control-prns, ACB cath with ropivacaine infusion.  3. IS-encourage  4. DVT proph- Mechanicals and aspirin  5. Bowel regimen  6. Resume home medications as appropriate  7. Monitor post-op labs  8. DC planning for home    - Hypertension:  Resume home medications as appropriate, formulary substitution when indicated.  Holding parameters.  Prn medications for elevated blood pressure.     -DM type 2  Hgb A1C 7  Hold OHA as appropriate  FSBG AC/HS, ( q 6 when NPO), along with correction humalog.  Long acting insulin if needed.    -Dyslipidemia:  Resume home regimen statin ( formulary substitution when appropriate).    -Anxiety and depression: Maintained on home regimen    -Hypothyroidism: Resume replacement.    Dragon disclaimer:  Part of this encounter note is an electronic transcription/translation of spoken language to printed text. The electronic translation of spoken language may permit erroneous, or at times, nonsensical words or phrases to be inadvertently transcribed; Although I have reviewed the note for such errors, some may still exist.    Jennifer Velasquez MD  02/29/24  16:45 EST

## 2024-02-29 NOTE — ANESTHESIA POSTPROCEDURE EVALUATION
Patient: Ro Bonds    Procedure Summary       Date: 02/29/24 Room / [facility]:  [facility]    Anesthesia Start: 1220 Anesthesia Stop: 1352    Procedure: TOTAL KNEE ARTHROPLASTY WITH CORI ROBOT - RIGHT (Right: Knee) Diagnosis:     Surgeons: Henry Machado MD Provider: Sarah Frias MD    Anesthesia Type: spinal ASA Status: 3            Anesthesia Type: spinal    Vitals  Vitals Value Taken Time   BP 92/64 02/29/24 1352   Temp 97.4 °F (36.3 °C) 02/29/24 1352   Pulse 56 02/29/24 1352   Resp 11 02/29/24 1352   SpO2 96 % 02/29/24 1352           Post Anesthesia Care and Evaluation    Patient location during evaluation: PACU  Patient participation: complete - patient participated  Level of consciousness: awake and alert  Pain score: 0  Pain management: adequate    Airway patency: patent  Anesthetic complications: No anesthetic complications  PONV Status: none  Cardiovascular status: hemodynamically stable and acceptable  Respiratory status: nonlabored ventilation, acceptable and nasal cannula  Hydration status: acceptable

## 2024-03-01 VITALS
HEIGHT: 59 IN | DIASTOLIC BLOOD PRESSURE: 75 MMHG | WEIGHT: 189 LBS | TEMPERATURE: 98 F | HEART RATE: 56 BPM | SYSTOLIC BLOOD PRESSURE: 128 MMHG | OXYGEN SATURATION: 96 % | BODY MASS INDEX: 38.1 KG/M2 | RESPIRATION RATE: 18 BRPM

## 2024-03-01 LAB
ANION GAP SERPL CALCULATED.3IONS-SCNC: 10 MMOL/L (ref 5–15)
BASOPHILS # BLD AUTO: 0.03 10*3/MM3 (ref 0–0.2)
BASOPHILS NFR BLD AUTO: 0.3 % (ref 0–1.5)
BUN SERPL-MCNC: 10 MG/DL (ref 8–23)
BUN/CREAT SERPL: 12.8 (ref 7–25)
CALCIUM SPEC-SCNC: 8.2 MG/DL (ref 8.6–10.5)
CHLORIDE SERPL-SCNC: 109 MMOL/L (ref 98–107)
CO2 SERPL-SCNC: 24 MMOL/L (ref 22–29)
CREAT SERPL-MCNC: 0.78 MG/DL (ref 0.57–1)
DEPRECATED RDW RBC AUTO: 39.1 FL (ref 37–54)
EGFRCR SERPLBLD CKD-EPI 2021: 81.8 ML/MIN/1.73
EOSINOPHIL # BLD AUTO: 0 10*3/MM3 (ref 0–0.4)
EOSINOPHIL NFR BLD AUTO: 0 % (ref 0.3–6.2)
ERYTHROCYTE [DISTWIDTH] IN BLOOD BY AUTOMATED COUNT: 12.9 % (ref 12.3–15.4)
GLUCOSE BLDC GLUCOMTR-MCNC: 108 MG/DL (ref 70–130)
GLUCOSE BLDC GLUCOMTR-MCNC: 109 MG/DL (ref 70–130)
GLUCOSE SERPL-MCNC: 122 MG/DL (ref 65–99)
HCT VFR BLD AUTO: 37.3 % (ref 34–46.6)
HGB BLD-MCNC: 11.8 G/DL (ref 12–15.9)
IMM GRANULOCYTES # BLD AUTO: 0.04 10*3/MM3 (ref 0–0.05)
IMM GRANULOCYTES NFR BLD AUTO: 0.5 % (ref 0–0.5)
LYMPHOCYTES # BLD AUTO: 1.58 10*3/MM3 (ref 0.7–3.1)
LYMPHOCYTES NFR BLD AUTO: 18.2 % (ref 19.6–45.3)
MCH RBC QN AUTO: 26.3 PG (ref 26.6–33)
MCHC RBC AUTO-ENTMCNC: 31.6 G/DL (ref 31.5–35.7)
MCV RBC AUTO: 83.1 FL (ref 79–97)
MONOCYTES # BLD AUTO: 0.79 10*3/MM3 (ref 0.1–0.9)
MONOCYTES NFR BLD AUTO: 9.1 % (ref 5–12)
NEUTROPHILS NFR BLD AUTO: 6.25 10*3/MM3 (ref 1.7–7)
NEUTROPHILS NFR BLD AUTO: 71.9 % (ref 42.7–76)
NRBC BLD AUTO-RTO: 0 /100 WBC (ref 0–0.2)
PLATELET # BLD AUTO: 260 10*3/MM3 (ref 140–450)
PMV BLD AUTO: 10.2 FL (ref 6–12)
POTASSIUM SERPL-SCNC: 4.6 MMOL/L (ref 3.5–5.2)
RBC # BLD AUTO: 4.49 10*6/MM3 (ref 3.77–5.28)
SODIUM SERPL-SCNC: 143 MMOL/L (ref 136–145)
WBC NRBC COR # BLD AUTO: 8.69 10*3/MM3 (ref 3.4–10.8)

## 2024-03-01 PROCEDURE — A9270 NON-COVERED ITEM OR SERVICE: HCPCS | Performed by: INTERNAL MEDICINE

## 2024-03-01 PROCEDURE — 63710000001 ACETAMINOPHEN EXTRA STRENGTH 500 MG TABLET: Performed by: ORTHOPAEDIC SURGERY

## 2024-03-01 PROCEDURE — 97110 THERAPEUTIC EXERCISES: CPT

## 2024-03-01 PROCEDURE — 63710000001 LEVOTHYROXINE 88 MCG TABLET: Performed by: INTERNAL MEDICINE

## 2024-03-01 PROCEDURE — A9270 NON-COVERED ITEM OR SERVICE: HCPCS | Performed by: ORTHOPAEDIC SURGERY

## 2024-03-01 PROCEDURE — 25010000002 CEFAZOLIN PER 500 MG: Performed by: ORTHOPAEDIC SURGERY

## 2024-03-01 PROCEDURE — 80048 BASIC METABOLIC PNL TOTAL CA: CPT | Performed by: ORTHOPAEDIC SURGERY

## 2024-03-01 PROCEDURE — 63710000001 ASPIRIN 325 MG TABLET DELAYED-RELEASE: Performed by: ORTHOPAEDIC SURGERY

## 2024-03-01 PROCEDURE — 63710000001 METOPROLOL TARTRATE 25 MG TABLET: Performed by: INTERNAL MEDICINE

## 2024-03-01 PROCEDURE — 63710000001 MELOXICAM 15 MG TABLET: Performed by: ORTHOPAEDIC SURGERY

## 2024-03-01 PROCEDURE — 97116 GAIT TRAINING THERAPY: CPT

## 2024-03-01 PROCEDURE — 63710000001 OXYCODONE 5 MG TABLET: Performed by: ORTHOPAEDIC SURGERY

## 2024-03-01 PROCEDURE — 97165 OT EVAL LOW COMPLEX 30 MIN: CPT

## 2024-03-01 PROCEDURE — 85025 COMPLETE CBC W/AUTO DIFF WBC: CPT | Performed by: ORTHOPAEDIC SURGERY

## 2024-03-01 PROCEDURE — 97535 SELF CARE MNGMENT TRAINING: CPT

## 2024-03-01 PROCEDURE — 82948 REAGENT STRIP/BLOOD GLUCOSE: CPT

## 2024-03-01 PROCEDURE — 97530 THERAPEUTIC ACTIVITIES: CPT

## 2024-03-01 RX ORDER — OXYCODONE HYDROCHLORIDE 5 MG/1
5 TABLET ORAL EVERY 4 HOURS PRN
Qty: 40 TABLET | Refills: 0 | Status: SHIPPED | OUTPATIENT
Start: 2024-03-01

## 2024-03-01 RX ORDER — MELOXICAM 15 MG/1
15 TABLET ORAL DAILY
Qty: 15 TABLET | Refills: 0 | Status: SHIPPED | OUTPATIENT
Start: 2024-03-01 | End: 2024-03-16

## 2024-03-01 RX ORDER — ROPIVACAINE HYDROCHLORIDE 2 MG/ML
1 INJECTION, SOLUTION EPIDURAL; INFILTRATION; PERINEURAL CONTINUOUS
Start: 2024-03-01

## 2024-03-01 RX ORDER — ASPIRIN 81 MG/1
81 TABLET ORAL 2 TIMES DAILY
Qty: 60 TABLET | Refills: 0 | Status: SHIPPED | OUTPATIENT
Start: 2024-03-02

## 2024-03-01 RX ORDER — ACETAMINOPHEN 500 MG
1000 TABLET ORAL EVERY 8 HOURS
Qty: 60 TABLET | Refills: 0 | Status: SHIPPED | OUTPATIENT
Start: 2024-03-01 | End: 2024-03-11

## 2024-03-01 RX ORDER — DOCUSATE SODIUM 100 MG/1
100 CAPSULE, LIQUID FILLED ORAL 2 TIMES DAILY
Qty: 30 CAPSULE | Refills: 0 | Status: SHIPPED | OUTPATIENT
Start: 2024-03-01 | End: 2024-03-16

## 2024-03-01 RX ADMIN — OXYCODONE HYDROCHLORIDE 5 MG: 5 TABLET ORAL at 08:15

## 2024-03-01 RX ADMIN — ACETAMINOPHEN 1000 MG: 500 TABLET ORAL at 04:04

## 2024-03-01 RX ADMIN — ACETAMINOPHEN 1000 MG: 500 TABLET ORAL at 11:43

## 2024-03-01 RX ADMIN — SODIUM CHLORIDE 2 G: 900 INJECTION INTRAVENOUS at 04:04

## 2024-03-01 RX ADMIN — ASPIRIN 325 MG: 325 TABLET, COATED ORAL at 08:15

## 2024-03-01 RX ADMIN — OXYCODONE HYDROCHLORIDE 5 MG: 5 TABLET ORAL at 13:03

## 2024-03-01 RX ADMIN — LEVOTHYROXINE SODIUM 88 MCG: 0.09 TABLET ORAL at 04:04

## 2024-03-01 RX ADMIN — METOPROLOL TARTRATE 25 MG: 25 TABLET, FILM COATED ORAL at 08:14

## 2024-03-01 RX ADMIN — MELOXICAM 15 MG: 15 TABLET ORAL at 08:15

## 2024-03-01 NOTE — ADDENDUM NOTE
Addendum  created 03/01/24 1039 by Obey White CRNA    Clinical Note Signed, Diagnosis association updated, Intraprocedure Blocks edited

## 2024-03-01 NOTE — DISCHARGE INSTRUCTIONS
InfuBLOCK - Patient Information    What is a pain pump?  The InfuBLOCK pump delivers post-operative, non-narcotic, numbing medication to the nerve near the surgical site for pain relief.     Where can I find information about my pain pump?           For more information about your pain pump, scan the QR code.  For additional patient resources, visit [email or web address].                                                                                               While your physician is your primary source for information about your treatment there may be times during your treatment that you need assistance with your infusion pump.     If you need assistance take the following steps:    The [employer] [facility] Hotline is Here for You 24/7.  Please call [phone] for the following concerns or complications:    Answers to questions about your infusion pump                 Tubing disconnect  Assistance with pump alarms                                                      Dislodged catheter  Excessive leakage noted from pump                                         Inadequate pain control    2.   [facility] Anesthesia Acute Pain Service: [phone] is available 24/7 for any further needs or concerns about medication or pain control. SMI COLD THERAPY - PATIENT INSTRUCTION SHEET    Cold Compression Therapy for your comfort and rehabilitation  Your caregivers want you to be productive in your rehab and comfortable during your stay. In keeping with those goals, you will be receiving an SMI Cold Therapy Wrap to help ease post-operative pain and swelling that might keep you from getting back on track! Your SMI Cold Therapy Wrap is effective and simple-to-use, and you will be encouraged to apply it throughout your hospital stay and at home through the duration of your recovery.    When you are ready to go home  Be sure to take your SMI Cold Therapy Wrap and both sets of Gel Bags with you for  continued comfort and use throughout your rehabilitation. If you don't already have them, ask your nurse or aide to retrieve your SMI Gel Bags from the patient freezer.    Home use precautions  Always follow your medical professional's application instructions upon discharge. Your SMI Cold Therapy Wrap and Gel Bags are designed to last for months following your surgery. Never heat the Gel Bags unless specified by your healthcare provider. Supervision is advised when using this product on children or geriatric patients. To avoid danger of suffocation, please keep the outer plastic packaging away from children & pets.    Cold Therapy Instructions  Place Gel Bags in a freezer set ¾ of the way to max temperature for at least (4) hours. For best results, lay the Gel Bags flat and [id number] in the freezer. Once frozen, slide Gel Bags into the gel pouch and secure your wrap to the affected area with the straps.  Gel wraps that have been stored in a freezer for an extended period of time may require a (10) minute period of softening up in a room temperature environment before application.  The gel pouch acts as a protective barrier. NEVER place frozen bags directly onto skin, as this may cause frostbite injury.  The SMI Cold Therapy Wrap is designed to be able to be worm while ambulating. The compression straps can be secured well enough so that the Wrap won't fall off while moving.  Wrap Application Videos can be viewed at [email or web address].  An additional protective barrier such as clothing, a washcloth, hand-towel or pillowcase may be used during prolonged treatment applications.  The Gel-Pouch and Wrap are both Latex-Free and the Gel Bag ingredients are non toxic.    SMI Wrap care instructions  The SMI Cold Therapy Wrap may be hand washed and hung to dry when needed.    [facility] re-order information  Additional SMI body specific wraps and/or Gel Bags can be re-ordered from [email or web address] or call  [phone] ([phone])

## 2024-03-01 NOTE — PLAN OF CARE
Goal Outcome Evaluation:  Plan of Care Reviewed With: patient, spouse, daughter        Progress: improving  Outcome Evaluation: OT evaluation completed. Postoperatively, pt presents w/ decreased I in ADLs, related t/fs, mobility compared to PLOF limited by decreased activity tolerance, impaired balance, muscle weakness at BUEs, fatigue with more dynamic demands and decreased distal reach acutely following sx impacting LB ADLs. OT issued LH AE to assist with LB ADLs and performance noted, anticipate greater I with further training and use. Pt req'd gross CGA for fxl t/fs and ADL related mobility w/ FWW throughout. Pt is below occupational performance baseline and would benefit from IPOT POC, recommend home w/ A at d/c when medically ready.      Anticipated Discharge Disposition (OT): home with assist

## 2024-03-01 NOTE — PROGRESS NOTES
"/70 (BP Location: Left arm, Patient Position: Lying)   Pulse 63   Temp 97.9 °F (36.6 °C) (Oral)   Resp 18   Ht 149.9 cm (59\")   Wt 85.7 kg (189 lb)   LMP 05/31/1995 (Exact Date) Comment: LMP ~ AGE 40  SpO2 93%   BMI 38.17 kg/m²     Lab Results (last 24 hours)       Procedure Component Value Units Date/Time    POC Glucose Once [[id number]]  (Normal) Collected: 03/01/24 0726    Specimen: Blood Updated: 03/01/24 0728     Glucose 108 mg/dL     Basic Metabolic Panel [[id number]]  (Abnormal) Collected: 03/01/24 0544    Specimen: Blood Updated: 03/01/24 0626     Glucose 122 mg/dL      BUN 10 mg/dL      Creatinine 0.78 mg/dL      Sodium 143 mmol/L      Potassium 4.6 mmol/L      Chloride 109 mmol/L      CO2 24.0 mmol/L      Calcium 8.2 mg/dL      BUN/Creatinine Ratio 12.8     Anion Gap 10.0 mmol/L      eGFR 81.8 mL/min/1.73     Narrative:      GFR Normal >60  Chronic Kidney Disease <60  Kidney Failure <15      CBC & Differential [[id number]]  (Abnormal) Collected: 03/01/24 0544    Specimen: Blood Updated: 03/01/24 0615    Narrative:      The following orders were created for panel order CBC & Differential.  Procedure                               Abnormality         Status                     ---------                               -----------         ------                     CBC Auto Differential[[id number]]        Abnormal            Final result                 Please view results for these tests on the individual orders.    CBC Auto Differential [[id number]]  (Abnormal) Collected: 03/01/24 0544    Specimen: Blood Updated: 03/01/24 0615     WBC 8.69 10*3/mm3      RBC 4.49 10*6/mm3      Hemoglobin 11.8 g/dL      Hematocrit 37.3 %      MCV 83.1 fL      MCH 26.3 pg      MCHC 31.6 g/dL      RDW 12.9 %      RDW-SD 39.1 fl      MPV 10.2 fL      Platelets 260 10*3/mm3      Neutrophil % 71.9 %      Lymphocyte % 18.2 %      Monocyte % 9.1 %      Eosinophil % 0.0 %      Basophil % 0.3 %      Immature Grans % 0.5 %      " Neutrophils, Absolute 6.25 10*3/mm3      Lymphocytes, Absolute 1.58 10*3/mm3      Monocytes, Absolute 0.79 10*3/mm3      Eosinophils, Absolute 0.00 10*3/mm3      Basophils, Absolute 0.03 10*3/mm3      Immature Grans, Absolute 0.04 10*3/mm3      nRBC 0.0 /100 WBC     POC Glucose Once [[id number]]  (Abnormal) Collected: 02/29/24 1956    Specimen: Blood Updated: 02/29/24 1958     Glucose 392 mg/dL     POC Glucose Once [[id number]]  (Abnormal) Collected: 02/29/24 1713    Specimen: Blood Updated: 02/29/24 1715     Glucose 164 mg/dL     Potassium [[id number]]  (Normal) Collected: 02/29/24 1103    Specimen: Blood Updated: 02/29/24 1120     Potassium 3.9 mmol/L      Comment: Slight hemolysis detected by analyzer. Result may be falsely elevated.       POC Glucose Once [[id number]]  (Normal) Collected: 02/29/24 1046    Specimen: Blood Updated: 02/29/24 1049     Glucose 96 mg/dL             Imaging Results (Last 24 Hours)       Procedure Component Value Units Date/Time    XR Knee 1 or 2 View Right [[id number]] Collected: 02/29/24 1452     Updated: 02/29/24 1456    Narrative:      XR KNEE 1 OR 2 VW RIGHT    Date of Exam: 2/29/2024 2:29 PM EST    Indication: Post-Op Knee Arthoplasty    Comparison: 9/5/2023.    Findings:  2 views reveal a total knee arthroplasty in normal anatomic alignment. There are no fractures or hardware complications.      Impression:      Impression:  Normal-appearing knee arthroplasty.      Electronically Signed: Michelle Figueroa MD    2/29/2024 2:53 PM EST    Workstation ID: [id number]            Patient Care Team:  Dimas Kumari APRN as PCP - General (Family Medicine)  Bob Peralta MD as Consulting Physician (Cardiology)    SUBJECTIVE: She reports she is doing well.  Pain is well-controlled.  Therapy yesterday limited due to dizziness and hypotension.  Feeling better this morning and has been up with nursing overnight.  Anticipates going home today.    PHYSICAL EXAM  Right knee incision is clean, dry and  intact with mesh dressing in place  Thigh and calf soft and nontender  Neurovascular intact distally       Status post right knee replacement    Essential hypertension    Hyperlipidemia LDL goal <100    Type 2 diabetes mellitus without complication, without long-term current use of insulin    Obesity    Anxiety and depression    Primary localized osteoarthritis of right knee      PLAN / DISPOSITION: 68-year-old female postop day #1 status post right total knee arthroplasty  -Mobilize with therapy as tolerated  -Aspirin for DVT prophylaxis  -Okay to shower with mesh dressing in place once nerve catheter removed  -Plan discharge home today.  Home physical therapy with [facility]  -Follow-up in 2 to 3 weeks    Henry Machado MD  03/01/24  07:32 EST

## 2024-03-01 NOTE — PLAN OF CARE
Patient ambulates with one assist. UOP ADQ. Elastic bandage CDI. Right toes wiggle with rapid cap refill. VSS on RA,. Denies pain/discomfort at this time. Call light in reach. Spouse at bedside.

## 2024-03-01 NOTE — THERAPY EVALUATION
Patient Name: Ro Bonds  : 1955    MRN: [id number]                              Today's Date: 3/1/2024       Admit Date: 2024    Visit Dx: No diagnosis found.  Patient Active Problem List   Diagnosis    Paroxysmal atrial flutter    Essential hypertension    Hyperlipidemia LDL goal <100    Type 2 diabetes mellitus without complication, without long-term current use of insulin    JOSEPH on CPAP    Status post total shoulder arthroplasty, left    Obesity    Postoperative pain    Wound dehiscence, surgical    Anxiety and depression    Status post incision and drainage, debridement, closure, left shoulder incision    Primary localized osteoarthritis of right knee    Status post right knee replacement     Past Medical History:   Diagnosis Date    Anxiety and depression     Arthritis     Asthma     Diabetes mellitus     Disease of thyroid gland     Elevated cholesterol     Hyperlipidemia     Hypertension     MRSA infection     HX    Posttraumatic seroma     after Abdominoplasty    Sleep apnea     NOT USING CPAP    Status post incision and drainage, debridement, closure, left shoulder incision 2022     Past Surgical History:   Procedure Laterality Date    ABDOMINOPLASTY      with excesss skin removal    BREAST EXCISIONAL BIOPSY Bilateral     SCARS GONE AFTER BREAST REDUCTION    CARDIAC CATHETERIZATION      no interventions     SECTION      x2    CHOLECYSTECTOMY      COLONOSCOPY      EAR TUBES      GASTRIC BYPASS      HYSTERECTOMY      AGE 40 complete    INCISION AND DRAINAGE ARM Left 2022    Procedure: Incision and drainage superficial wound dehiscence with debridement, irrigation and closure;  Surgeon: Oneil Jones MD;  Location: [facility];  Service: Orthopedics;  Laterality: Left;    REDUCTION MAMMAPLASTY Bilateral     TONSILLECTOMY      TOTAL KNEE ARTHROPLASTY Right 2024    Procedure: TOTAL KNEE ARTHROPLASTY WITH CORI ROBOT - RIGHT;  Surgeon: Henry Machado  MD Blair;  Location:  [facility];  Service: Robotics - Ortho;  Laterality: Right;    TOTAL SHOULDER ARTHROPLASTY Left 10/31/2022    Procedure: TOTAL SHOULDER ARTHROPLASTY, BICEPS TENODESIS - LEFT;  Surgeon: Oneil Jones MD;  Location:  [facility];  Service: Orthopedics;  Laterality: Left;      General Information       Row Name 03/01/24 1000          OT Time and Intention    Document Type evaluation  -JY     Mode of Treatment occupational therapy;individual therapy  -JY       Row Name 03/01/24 1000          General Information    Patient Profile Reviewed yes  -JY     Prior Level of Function min assist:;all household mobility;community mobility;gait;transfer;bed mobility;dressing;bathing;home management;cooking;cleaning;independent:;driving;feeding;grooming  pain at R knee limited pt in LB ADL performance and ADL related mobility, pt denies any actal falls however near fall instances  -JY     Existing Precautions/Restrictions fall;other (see comments)  adductor canal nerve cath  -JY     Barriers to Rehab none identified  -JY       Row Name 03/01/24 1000          Occupational Profile    Environmental Supports and Barriers (Occupational Profile) has both walk in shower and step over tub w/ seat access with plan to use walk in shower at d/c, has standard toilet w/ purchased riser w/ arm rests; DME: does not have a FWW (CM notified) nor LH AE  -JY       Row Name 03/01/24 1000          Living Environment    People in Home spouse;child(cheli), adult;grandchild(cehli);other (see comments)  family able to assist as needed  -JY       Row Name 03/01/24 1000          Home Main Entrance    Number of Stairs, Main Entrance other (see comments)  threshold  -JY     Stair Railings, Main Entrance none  -JY       Row Name 03/01/24 1000          Stairs Within Home, Primary    Number of Stairs, Within Home, Primary none  -JY       Row Name 03/01/24 1000          Cognition    Orientation Status (Cognition) oriented x 4  -JY        Row Name 03/01/24 1000          Safety Issues, Functional Mobility    Safety Issues Affecting Function (Mobility) awareness of need for assistance;insight into deficits/self-awareness  -LEANDER     Impairments Affecting Function (Mobility) balance;endurance/activity tolerance;pain;range of motion (ROM);strength  -JOCTAVIO     Comment, Safety Issues/Impairments (Mobility) pt alert and able to follow commands  -LEANDER               User Key  (r) = Recorded By, (t) = Taken By, (c) = Cosigned By      Initials Name Provider Type    Yulia Melendrez OT Occupational Therapist                     Mobility/ADL's       Row Name 03/01/24 1004          Bed Mobility    Bed Mobility other (see comments)  received UIC  -LEANDER     Supine-Sit [place] (Bed Mobility) not tested  -LEANDER     Comment, (Bed Mobility) received UIC and preferred to remain OOB thus bed mobility not assessed this date; reviewed with pt verbally to ensure close monitoring of nerve cath during bed mobility to decrease risk of dislodging  -LEANDER       Row Name 03/01/24 1004          Transfers    Transfers sit-stand transfer;stand-sit transfer;toilet transfer  -LEANDER     Comment, (Transfers) skilled cues for optimal hand placement for controlled ascend, descend specifically to push up from seated surface and reach back prior to sitting once aligned and in close proximity to seated surface, good carryover noted; emphasized close monitoring of nerve cath when changing position to avoid dislodging  -LEANDER       Row Name 03/01/24 1004          Sit-Stand Transfer    Sit-Stand [place] (Transfers) contact guard;1 person assist;verbal cues  -JY     Assistive Device (Sit-Stand Transfers) walker, front-wheeled  -JY       Row Name 03/01/24 1004          Stand-Sit Transfer    Stand-Sit [place] (Transfers) contact guard;1 person assist;verbal cues  -JY     Assistive Device (Stand-Sit Transfers) walker, front-wheeled  -JY       Row Name 03/01/24 1004          Toilet Transfer    Type  (Toilet Transfer) sit-stand;stand-sit  -JY     [place] Level (Toilet Transfer) contact guard;1 person assist;verbal cues  -JY     Assistive Device (Toilet Transfer) commode;raised toilet seat;grab bars/safety frame  -JY     Comment, (Toilet Transfer) emphasized optimal set up of riser that pt purchased to promote height extension and BUE support  -       Row Name 03/01/24 1004          Functional Mobility    Functional Mobility- Ind. Level contact guard assist;1 person;verbal cues required  -JY     Functional Mobility- Device walker, front-wheeled  -JY     Functional Mobility-Distance (Feet) --  in room ADL related mobility distances  -JY     Functional Mobility- Comment defer to PT for specifics however during in room ADL related mobility pt req'd gross CGA with FWW; cues for ensured optimal LE placement simultaneous with wx mgmt to avoid pivot at R knee  -       Row Name 03/01/24 1004          Activities of Daily Living    BADL Assessment/Intervention upper body dressing;lower body dressing;bathing;grooming  -       Row Name 03/01/24 1004          Mobility    Extremity Weight-bearing Status right lower extremity  -J     Right Lower Extremity (Weight-bearing Status) weight-bearing as tolerated (WBAT)  -       Row Name 03/01/24 1004          Upper Body Dressing Assessment/Training    [place] Level (Upper Body Dressing) doff;pajama/robe;don;pull-over garment;independent  -J     Position (Upper Body Dressing) unsupported sitting  -       Row Name 03/01/24 1004          Lower Body Dressing Assessment/Training    [place] Level (Lower Body Dressing) doff;don;pants/bottoms;socks;other (see comments);standby assist;verbal cues  undergarments  -JY     Assistive Devices (Lower Body Dressing) long-handled shoe horn;reacher;sock-aid;other (see comments)  issued LH AE to assist with LBD with education on each piece in order to promote greater distal reach and decreased pain to promote more I with LBD   -JY     Position (Lower Body Dressing) unsupported sitting;supported sitting  -JY     Comment, (Lower Body Dressing) educated pt on optimal position, tech, seq for LBD emphasizing seq given more impacted RLE, facilitated use of LH AE to assist with distal reach and performance improved as indicated  -[facility] Name 03/01/24 1004          Bathing Assessment/Intervention    [place] Level (Bathing) lower body;not tested  -     Assistive Devices (Bathing) long-handled sponge  -JY     Position (Bathing) unsupported sitting  -JY     Comment, (Bathing) issued LH sponge to assist with distal reach toward LEs with decreased pain and ultimately greater I; educated pt on no showering while nerve cath still in place; did not assess authentic LBB however during simulated bathing pt able to reach LEs w/ sponge  -[facility] Name 03/01/24 1004          Grooming Assessment/Training    [place] Level (Grooming) wash face, hands;set up  -JY     Position (Grooming) supported sitting  -JY               User Key  (r) = Recorded By, (t) = Taken By, (c) = Cosigned By      Initials Name Provider Type    Yulia Melendrez OT Occupational Therapist                   Obj/Interventions       Row Name 03/01/24 1022          Sensory Assessment (Somatosensory)    Sensory Assessment (Somatosensory) bilateral UE;sensation intact  -     Bilateral UE Sensory Assessment general sensation;light touch awareness;intact  -     Sensory Assessment denies any numbness or tingling and able to recognize LT stimuli as intact and symmetrical at BUEs  -[facility] Name 03/01/24 1022          Vision Assessment/Intervention    Visual Impairment/Limitations corrective lenses full-time  -     Vision Assessment Comment denies any acute changes to vision  -[facility] Name 03/01/24 1022          Range of Motion Comprehensive    General Range of Motion bilateral upper extremity ROM WFL  -[facility] Name 03/01/24 1022          Strength  Comprehensive (MMT)    General Manual Muscle Testing (MMT) Assessment upper extremity strength deficits identified  -JY     Comment, General Manual Muscle Testing (MMT) Assessment BUE MMS grossly 4+/5 per MMT, L shoulder 4/5 with reported remote shoulder sx in '22  -JY       Row Name 03/01/24 1022          Motor Skills    Motor Skills functional endurance;coordination  -JY     Coordination bilateral;upper extremity;finger to nose;other (see comments);WFL  finger thmb opposition  -JY     Functional Endurance decreased activity tolerance during more dynamic demands  -JY       Row Name 03/01/24 1022          Balance    Balance Assessment sitting static balance;sitting dynamic balance;standing static balance;standing dynamic balance  -JY     Static Sitting Balance independent  -JY     Dynamic Sitting Balance standby assist;other (see comments)  pre t/f skills and reach to LB  -JY     Position, Sitting Balance unsupported;sitting in chair  -JY     Static Standing Balance contact guard;verbal cues  -JY     Dynamic Standing Balance contact guard;verbal cues  -JY     Position/Device Used, Standing Balance supported;walker, front-wheeled  -JY     Balance Interventions sitting;standing;static;dynamic;sit to stand;supported;occupation based/functional task  -JY     Comment, Balance no overt LOB during seated or standing tasks, used FWW during standing  -JY               User Key  (r) = Recorded By, (t) = Taken By, (c) = Cosigned By      Initials Name Provider Type    Yulia Melendrez OT Occupational Therapist                   Goals/Plan       Row Name 03/01/24 1032          Transfer Goal 1 (OT)    Activity/Assistive Device (Transfer Goal 1, OT) sit-to-stand/stand-to-sit;bed-to-chair/chair-to-bed;toilet;commode;commode, bedside without drop arms;walker, rolling  -JY     [place] Level/Cues Needed (Transfer Goal 1, OT) standby assist;verbal cues required  -JY     Time Frame (Transfer Goal 1, OT) long term goal (LTG);by  discharge  -JY     Progress/Outcome (Transfer Goal 1, OT) new goal  -JY       Row Name 03/01/24 1032          Dressing Goal 1 (OT)    Activity/Device (Dressing Goal 1, OT) lower body dressing;other (see comments)  d/d TB garments with LH AE PRN  -JY     [place]/Cues Needed (Dressing Goal 1, OT) supervision required;verbal cues required  -JY     Time Frame (Dressing Goal 1, OT) long term goal (LTG);by discharge  -JY     Progress/Outcome (Dressing Goal 1, OT) new goal  -JY       Row Name 03/01/24 1032          Toileting Goal 1 (OT)    Activity/Device (Toileting Goal 1, OT) adjust/manage clothing;perform perineal hygiene;commode;commode, bedside without drop arms;grab bar/safety frame;raised toilet seat  -JY     [place] Level/Cues Needed (Toileting Goal 1, OT) standby assist;verbal cues required  -JY     Time Frame (Toileting Goal 1, OT) long term goal (LTG);by discharge  -JY     Progress/Outcome (Toileting Goal 1, OT) new goal  -JY       Row Name 03/01/24 1032          Strength Goal 1 (OT)    Strength Goal 1 (OT) Pt to complete seated HEP encompassing BUEs targeting strength and endurance with progressive sets/reps/resistance in order to improve integration in ADLs, related t/fs, mobility  -JY     Time Frame (Strength Goal 1, OT) long term goal (LTG);by discharge  -JY     Progress/Outcome (Strength Goal 1, OT) new goal  -Y       Row Name 03/01/24 1032          Therapy Assessment/Plan (OT)    Planned Therapy Interventions (OT) activity tolerance training;adaptive equipment training;BADL retraining;functional balance retraining;occupation/activity based interventions;patient/caregiver education/training;ROM/therapeutic exercise;strengthening exercise;transfer/mobility retraining  -JY               User Key  (r) = Recorded By, (t) = Taken By, (c) = Cosigned By      Initials Name Provider Type    Yulia Melendrez, NESSA Occupational Therapist                   Clinical Impression       Row Name 03/01/24 1020           Pain Assessment    Pretreatment Pain Rating 1/10  -JY     Posttreatment Pain Rating 3/10  -JY     Pain Location - Side/Orientation Right  -JY     Pain Location anterior  -JY     Pain Location - knee  -JY     Pre/Posttreatment Pain Comment increased pain at R knee with mobility, reduced with seated rest  -JY     Pain Intervention(s) Repositioned;Ambulation/increased activity;Rest;Elevated;Nursing Notified;Cold applied  -JY       Row Name 03/01/24 1025          Plan of Care Review    Plan of Care Reviewed With patient;spouse;daughter  -JY     Progress improving  -JY     Outcome Evaluation OT evaluation completed. Postoperatively, pt presents w/ decreased I in ADLs, related t/fs, mobility compared to PLOF limited by decreased activity tolerance, impaired balance, muscle weakness at BUEs, fatigue with more dynamic demands and decreased distal reach acutely following sx impacting LB ADLs. OT issued  AE to assist with LB ADLs and performance noted, anticipate greater I with further training and use. Pt req'd gross CGA for fxl t/fs and ADL related mobility w/ FWW throughout. Pt is below occupational performance baseline and would benefit from IPOT POC, recommend home w/ A at d/c when medically ready.  -JY       Row Name 03/01/24 1025          Therapy Assessment/Plan (OT)    Patient/Family Therapy Goal Statement (OT) to maximize I in ADLs, related t/fs, mobility, return to PLOF  -JY     Rehab Potential (OT) good, to achieve stated therapy goals  -JY     Criteria for Skilled Therapeutic Interventions Met (OT) yes;skilled treatment is necessary  -JY     Therapy Frequency (OT) daily  -JY       Row Name 03/01/24 1025          Therapy Plan Review/Discharge Plan (OT)    Equipment Needs Upon Discharge (OT) dressing equipment;bathing equipment;walker, rolling  -JY     Anticipated Discharge Disposition (OT) home with assist  -JY       Row Name 03/01/24 1025          Vital Signs    Pre Systolic BP Rehab 131  -JY     Pre  Treatment Diastolic BP 77  -JY     Pretreatment Heart Rate (beats/min) 91  -JY     Pre SpO2 (%) 93  -JY     O2 Delivery Pre Treatment room air  -JY     O2 Delivery Intra Treatment room air  -JY     Post SpO2 (%) 93  -JY     O2 Delivery Post Treatment room air  -JY     Pre Patient Position Sitting  -JY     Intra Patient Position Standing  -JY     Post Patient Position Sitting  -JY       Row Name 03/01/24 1025          Positioning and Restraints    Pre-Treatment Position sitting in chair/recliner  -JY     Post Treatment Position chair  -JY     In Chair notified nsg;reclined;call light within reach;encouraged to call for assist;exit alarm on;with family/caregiver;waffle cushion;with PT;legs elevated  cold pack to R knee; in process of setting pt up w/ SCDs when PT entered and agreed to finish set up after PT interventions  -JY               User Key  (r) = Recorded By, (t) = Taken By, (c) = Cosigned By      Initials Name Provider Type    Yulia Melendrez OT Occupational Therapist                   Outcome Measures       Row Name 03/01/24 1035          How much help from another is currently needed...    Putting on and taking off regular lower body clothing? 3  -JY     Bathing (including washing, rinsing, and drying) 3  -JY     Toileting (which includes using toilet bed pan or urinal) 3  -JY     Putting on and taking off regular upper body clothing 4  -JY     Taking care of personal grooming (such as brushing teeth) 4  -JY     Eating meals 4  -JY     AM-PAC 6 Clicks Score (OT) 21  -JY       Row Name 03/01/24 1035          Functional Assessment    Outcome Measure Options AM-PAC 6 Clicks Daily Activity (OT)  -JY               User Key  (r) = Recorded By, (t) = Taken By, (c) = Cosigned By      Initials Name Provider Type    Yulia Melendrez OT Occupational Therapist                    Occupational Therapy Education       Title: PT OT SLP Therapies (In Progress)       Topic: Occupational Therapy (In Progress)        Point: ADL training (In Progress)       Description:   Instruct learner(s) on proper safety adaptation and remediation techniques during self care or transfers.   Instruct in proper use of assistive devices.                  Learning Progress Summary             Patient Acceptance, E,D, NR by LEANDER at 3/1/2024 0849   Family Acceptance, E,D, NR by LEANDER at 3/1/2024 0849                         Point: Home exercise program (Not Started)       Description:   Instruct learner(s) on appropriate technique for monitoring, assisting and/or progressing therapeutic exercises/activities.                  Learner Progress:  Not documented in this visit.              Point: Precautions (In Progress)       Description:   Instruct learner(s) on prescribed precautions during self-care and functional transfers.                  Learning Progress Summary             Patient Acceptance, E,D, NR by LEANDER at 3/1/2024 0849   Family Acceptance, E,D, NR by LEANDER at 3/1/2024 0849                         Point: Body mechanics (In Progress)       Description:   Instruct learner(s) on proper positioning and spine alignment during self-care, functional mobility activities and/or exercises.                  Learning Progress Summary             Patient Acceptance, E,D, NR by LEANDER at 3/1/2024 0849   Family Acceptance, E,D, NR by LEANDER at 3/1/2024 0849                                         User Key       Initials Effective Dates Name Provider Type Discipline    LEANDER 06/16/21 -  Yulia Clancy OT Occupational Therapist OT                  OT Recommendation and Plan  Planned Therapy Interventions (OT): activity tolerance training, adaptive equipment training, BADL retraining, functional balance retraining, occupation/activity based interventions, patient/caregiver education/training, ROM/therapeutic exercise, strengthening exercise, transfer/mobility retraining  Therapy Frequency (OT): daily  Plan of Care Review  Plan of Care Reviewed With: patient, spouse,  daughter  Progress: improving  Outcome Evaluation: OT evaluation completed. Postoperatively, pt presents w/ decreased I in ADLs, related t/fs, mobility compared to PLOF limited by decreased activity tolerance, impaired balance, muscle weakness at BUEs, fatigue with more dynamic demands and decreased distal reach acutely following sx impacting LB ADLs. OT issued  AE to assist with LB ADLs and performance noted, anticipate greater I with further training and use. Pt req'd gross CGA for fxl t/fs and ADL related mobility w/ FWW throughout. Pt is below occupational performance baseline and would benefit from IPOT POC, recommend home w/ A at d/c when medically ready.     Time Calculation:   Evaluation Complexity (OT)  Review Occupational Profile/Medical/Therapy History Complexity: brief/low complexity  Assessment, Occupational Performance/Identification of Deficit Complexity: 1-3 performance deficits  Clinical Decision Making Complexity (OT): problem focused assessment/low complexity  Overall Complexity of Evaluation (OT): low complexity     Time Calculation- OT       Row Name 03/01/24 1037             Time Calculation- OT    OT Start Time 0849  -JY      OT Received On 03/01/24  -JY      OT Goal Re-Cert Due Date 03/11/24  -JY         Timed Charges    33340 - OT Therapeutic Activity Minutes 12  -JY      33981 - OT Self Care/Mgmt Minutes 19  -JY         Untimed Charges    OT Eval/Re-eval Minutes 50  -JY         Total Minutes    Timed Charges Total Minutes 31  -JY      Untimed Charges Total Minutes 50  -JY       Total Minutes 81  -JY                User Key  (r) = Recorded By, (t) = Taken By, (c) = Cosigned By      Initials Name Provider Type    Yulia Melendrez OT Occupational Therapist                  Therapy Charges for Today       Code Description Service Date Service Provider Modifiers Qty    29151438215  OT THERAPEUTIC ACT EA 15 MIN 3/1/2024 Yulia Clancy OT GO 1    88696809854 HC OT SELF CARE/MGMT/TRAIN EA 15  MIN 3/1/2024 Yulia Clancy, OT GO 1    57850081951 HC OT EVAL LOW COMPLEXITY 4 3/1/2024 Yulia Clancy OT GO 1                 Yulia Clancy OT  3/1/2024

## 2024-03-01 NOTE — PROGRESS NOTES
[facility]    Acute pain service Inpatient Progress Note    Patient Name: Ro Bonds  :  1955  MRN:  [id number]        Acute Pain  Service Inpatient Progress Note:    Analgesia:Good  Pain Score:10  LOC: alert and awake  Side Effects:None  Catheter Site:clean, dry and dressing intact  Cath type: peripheral nerve cath(InfuSystem)  Infusion rate: Fem/ Add: Basal: 1ml/hr, PIB: 8ml q 8h, PCA: 8ml q 30 min  Catheter Plan:Catheter to remain Insitu and Continue catheter infusion rate unchanged

## 2024-03-01 NOTE — PLAN OF CARE
Goal Outcome Evaluation:  Plan of Care Reviewed With: patient        Progress: improving  Outcome Evaluation: Patient demonstrates safe mobility with walker. Her pain control in good alowing improving knee ROM. She is going home with home health PT.      Anticipated Discharge Disposition (PT): home with assist, home with home health

## 2024-03-01 NOTE — DISCHARGE SUMMARY
Patient Name: Ro Bonds  MRN: [id number]  : 1955  DOS: 3/1/2024    Attending: Henry Machado,*    Primary Care Provider: Dimas Kumari APRN    Date of Admission:.2024 10:07 AM    Date of Discharge:  3/1/2024    Discharge Diagnosis:   Status post right knee replacement    Essential hypertension    Hyperlipidemia LDL goal <100    Type 2 diabetes mellitus without complication, without long-term current use of insulin    Obesity    Anxiety and depression    Primary localized osteoarthritis of right knee      Hospital Course    At admit:  ***     After admit:    Patient was provided pain medications as needed for pain control, along with adductor canal nerve block infusion of Ropivacaine.    Adjustments were made to pain medications to optimize postop pain management. Risks and benefits of opiate medications discussed with patient. CLIFFORD report was reviewed.    *** was seen by PT and OT and has progressed well over *** stay.    *** used an IS for atelectasis prophylaxis and *** along with mechanicals for DVT prophylaxis.    Home medications were resumed as appropriate, and labs were monitored and remained fairly stable.     With the progress *** has made, *** is ready for DC *** today.      *** will have an Infupump ( instructed on it during this admit).    Discussed with patient regarding plan and *** shows understanding and agreement.    Patient will have PT following discharge.        Procedures Performed  Procedure(s):  TOTAL KNEE ARTHROPLASTY WITH CORI ROBOT - RIGHT       Pertinent Test Results:    I reviewed the patient's new clinical results.   Results from last 7 days   Lab Units 24  0544   WBC 10*3/mm3 8.69   HEMOGLOBIN g/dL 11.8*   HEMATOCRIT % 37.3   PLATELETS 10*3/mm3 260     Results from last 7 days   Lab Units 24  0544 24  1103   SODIUM mmol/L 143  --    POTASSIUM mmol/L 4.6 3.9   CHLORIDE mmol/L 109*  --    CO2 mmol/L 24.0  --    BUN mg/dL 10  --   "  CREATININE mg/dL 0.78  --    CALCIUM mg/dL 8.2*  --    GLUCOSE mg/dL 122*  --      I reviewed the patient's new imaging including images and reports.      Physical therapy  ***  Discharge Assessment:       Visit Vitals  /75 (BP Location: Left arm, Patient Position: Lying)   Pulse 56   Temp 98 °F (36.7 °C) (Oral)   Resp 18   Ht 149.9 cm (59\")   Wt 85.7 kg (189 lb)   LMP 1995 (Exact Date) Comment: LMP ~ AGE 40   SpO2 96%   BMI 38.17 kg/m²     Temp (24hrs), Av.6 °F (36.4 °C), Min:97.1 °F (36.2 °C), Max:98.3 °F (36.8 °C)      General Appearance:    Alert, cooperative, in no acute distress   Lungs:     Clear to auscultation,respirations regular, even and                   unlabored    Heart:    Regular rhythm and normal rate, normal S1 and S2   Abdomen:     Normal bowel sounds, no masses, no organomegaly, soft        non-tender, non-distended, no guarding, no rebound                 tenderness   Extremities:   CDI dressing surgical knee, *** . ACB cath present, infupump.   Pulses:   Pulses palpable and equal bilaterally   Skin:   No bleeding, bruising or rash   Neurologic:   Cranial nerves 2 - 12 grossly intact, sensation intact, Flexion and dorsiflexion intact bilateral feet.         Discharge Disposition: ***    Discharge Medications     Discharge Medications        New Medications        Instructions Start Date   acetaminophen 500 MG tablet  Commonly known as: TYLENOL   1,000 mg, Oral, Every 8 Hours, Take every 8 hours  as needed after 1 week      aspirin 81 MG EC tablet  Commonly known as: ASPIR   81 mg, Oral, 2 Times Daily   Start Date: 2024     docusate sodium 100 MG capsule  Commonly known as: COLACE   100 mg, Oral, 2 Times Daily      oxyCODONE 5 MG immediate release tablet  Commonly known as: Roxicodone   5 mg, Oral, Every 4 Hours PRN      ropivacaine 0.2 % infusion (INFUSYSTEM)  Commonly known as: NAROPIN   1 mL/hr (2 mg/hr), Peripheral Nerve, Continuous             Changes to " Medications        Instructions Start Date   meloxicam 15 MG tablet  Commonly known as: MOBIC  What changed: Another medication with the same name was added. Make sure you understand how and when to take each.   15 mg, Oral, Every Morning      meloxicam 15 MG tablet  Commonly known as: MOBIC  What changed: You were already taking a medication with the same name, and this prescription was added. Make sure you understand how and when to take each.   15 mg, Oral, Daily             Continue These Medications        Instructions Start Date   albuterol sulfate  (90 Base) MCG/ACT inhaler  Commonly known as: PROVENTIL HFA;VENTOLIN HFA;PROAIR HFA   Inhale 2 puffs Every 4 (Four) Hours As Needed for Wheezing or Shortness of Air.      alendronate 70 MG tablet  Commonly known as: FOSAMAX   1 tablet, Oral, Weekly, Every Sunday      atorvastatin 20 MG tablet  Commonly known as: LIPITOR   20 mg, Oral, Every Morning      EPINEPHrine 0.3 MG/0.3ML solution auto-injector injection  Commonly known as: EPIPEN   Inject  into the appropriate muscle as directed by prescriber 1 (One) Time.      furosemide 40 MG tablet  Commonly known as: LASIX   40 mg, Oral, Every Morning      hydrOXYzine 10 MG tablet  Commonly known as: ATARAX   10-30 mg, Oral, Every 4 Hours PRN      levothyroxine 88 MCG tablet  Commonly known as: SYNTHROID, LEVOTHROID   88 mcg, Oral, Every Early Morning      Loratadine 10 MG capsule   1 capsule, Oral, Daily PRN      metoprolol tartrate 25 MG tablet  Commonly known as: LOPRESSOR   25 mg, Oral, 2 Times Daily, NEEDS APPT FOR FUTURE REFILLS      montelukast 10 MG tablet  Commonly known as: SINGULAIR   1 tablet, Oral, Nightly      potassium chloride ER 20 MEQ tablet controlled-release ER tablet  Commonly known as: K-TAB   20 mEq, Oral, Every Morning      spironolactone 25 MG tablet  Commonly known as: ALDACTONE   25 mg, Oral, Every Morning      traZODone 50 MG tablet  Commonly known as: DESYREL   1 tablet, Oral,  Nightly      vitamin D 1.25 MG (34805 UT) capsule capsule  Commonly known as: ERGOCALCIFEROL   50,000 Units, Oral, Every 7 Days, SUNDAYS               Discharge Diet:     Activity at Discharge:     Follow-up Appointments:  No future appointments.      Dragon disclaimer:  Part of this encounter note is an electronic transcription/translation of spoken language to printed text. The electronic translation of spoken language may permit erroneous, or at times, nonsensical words or phrases to be inadvertently transcribed; Although I have reviewed the note for such errors, some may still exist.       Jennifer Velasquez MD  03/01/24  12:32 EST             transcription/translation of spoken language to printed text. The electronic translation of spoken language may permit erroneous, or at times, nonsensical words or phrases to be inadvertently transcribed; Although I have reviewed the note for such errors, some may still exist.       Jennifer Velasquez MD  03/01/24  12:32 EST

## 2024-03-01 NOTE — THERAPY DISCHARGE NOTE
Patient Name: Ro Bonds  : 1955    MRN: [id number]                              Today's Date: 3/1/2024       Admit Date: 2024    Visit Dx: No diagnosis found.  Patient Active Problem List   Diagnosis    Paroxysmal atrial flutter    Essential hypertension    Hyperlipidemia LDL goal <100    Type 2 diabetes mellitus without complication, without long-term current use of insulin    JOSEPH on CPAP    Status post total shoulder arthroplasty, left    Obesity    Postoperative pain    Wound dehiscence, surgical    Anxiety and depression    Status post incision and drainage, debridement, closure, left shoulder incision    Primary localized osteoarthritis of right knee    Status post right knee replacement     Past Medical History:   Diagnosis Date    Anxiety and depression     Arthritis     Asthma     Diabetes mellitus     Disease of thyroid gland     Elevated cholesterol     Hyperlipidemia     Hypertension     MRSA infection     HX    Posttraumatic seroma     after Abdominoplasty    Sleep apnea     NOT USING CPAP    Status post incision and drainage, debridement, closure, left shoulder incision 2022     Past Surgical History:   Procedure Laterality Date    ABDOMINOPLASTY      with excesss skin removal    BREAST EXCISIONAL BIOPSY Bilateral     SCARS GONE AFTER BREAST REDUCTION    CARDIAC CATHETERIZATION      no interventions     SECTION      x2    CHOLECYSTECTOMY      COLONOSCOPY      EAR TUBES      GASTRIC BYPASS      HYSTERECTOMY      AGE 40 complete    INCISION AND DRAINAGE ARM Left 2022    Procedure: Incision and drainage superficial wound dehiscence with debridement, irrigation and closure;  Surgeon: Oneil Jones MD;  Location: [facility];  Service: Orthopedics;  Laterality: Left;    REDUCTION MAMMAPLASTY Bilateral     TONSILLECTOMY      TOTAL KNEE ARTHROPLASTY Right 2024    Procedure: TOTAL KNEE ARTHROPLASTY WITH CORI ROBOT - RIGHT;  Surgeon: Henry Machado  MD Blair;  Location: [facility] OR;  Service: Robotics - Ortho;  Laterality: Right;    TOTAL SHOULDER ARTHROPLASTY Left 10/31/2022    Procedure: TOTAL SHOULDER ARTHROPLASTY, BICEPS TENODESIS - LEFT;  Surgeon: Oneil Jones MD;  Location: [facility] OR;  Service: Orthopedics;  Laterality: Left;      General Information       Row Name 03/01/24 1101          Physical Therapy Time and Intention    Document Type discharge treatment  -SC     Mode of Treatment physical therapy  -SC       Row Name 03/01/24 1101          General Information    Patient Profile Reviewed yes  -SC     Prior Level of Function --  nerve cath  -SC     Existing Precautions/Restrictions fall;other (see comments)  -SC       Row Name 03/01/24 1101          Cognition    Orientation Status (Cognition) oriented x 4  -SC       Row Name 03/01/24 1101          Safety Issues, Functional Mobility    Impairments Affecting Function (Mobility) balance;endurance/activity tolerance;pain;range of motion (ROM);strength  -SC     Comment, Safety Issues/Impairments (Mobility) alert, following commands  -SC               User Key  (r) = Recorded By, (t) = Taken By, (c) = Cosigned By      Initials Name Provider Type    SC Rosibel Lloyd, PT Physical Therapist                   Mobility       Row Name 03/01/24 1102          Bed Mobility    Comment, (Bed Mobility) uic  -SC       Row Name 03/01/24 1102          Transfers    Comment, (Transfers) cues for hand placement. Demonstrated good technique  -SC       Row Name 03/01/24 1102          Sit-Stand Transfer    Sit-Stand [place] (Transfers) contact guard;1 person assist;verbal cues  -SC     Assistive Device (Sit-Stand Transfers) walker, front-wheeled  -SC       Row Name 03/01/24 1102          Gait/Stairs (Locomotion)    [place] Level (Gait) modified independence;verbal cues  -SC     Assistive Device (Gait) walker, front-wheeled  -SC     Distance in Feet (Gait) 350  -SC     Deviations/Abnormal Patterns (Gait)  right sided deviations;antalgic;weight shifting decreased;stride length decreased  -SC     Right Sided Gait Deviations heel strike decreased  -SC     Comment, (Gait/Stairs) Gt training focused on step throught gait pattern. Cues for heel strike. Encouraged full wt shifting and upright posture. Demonstrated improving technique and good control of walker. No LOB or buckling noted  -SC       Row Name 03/01/24 1102          Mobility    Extremity Weight-bearing Status right lower extremity  -SC     Right Lower Extremity (Weight-bearing Status) weight-bearing as tolerated (WBAT)  -SC               User Key  (r) = Recorded By, (t) = Taken By, (c) = Cosigned By      Initials Name Provider Type    SC Prema, Shearon A, PT Physical Therapist                   Obj/Interventions       Row Name 03/01/24 1105          Range of Motion Comprehensive    Comment, General Range of Motion 10-90 deg r knee  -SC       Row Name 03/01/24 1105          Motor Skills    Therapeutic Exercise knee;ankle  -SC       Row Name 03/01/24 1105          Knee (Therapeutic Exercise)    Knee Isometrics (Therapeutic Exercise) right;sitting;10 repetitions;quad sets  -SC     Knee Strengthening (Therapeutic Exercise) right;heel slides;SLR (straight leg raise);SAQ (short arc quad);LAQ (long arc quad);10 repetitions  -SC       Row Name 03/01/24 1105          Ankle (Therapeutic Exercise)    Ankle (Therapeutic Exercise) AROM (active range of motion)  -SC     Ankle AROM (Therapeutic Exercise) bilateral;dorsiflexion;plantarflexion;10 repetitions  -SC       Row Name 03/01/24 1105          Balance    Dynamic Standing Balance 1-person assist;modified independence  -SC     Position/Device Used, Standing Balance supported;walker, rolling  -SC     Comment, Balance no lob  -SC               User Key  (r) = Recorded By, (t) = Taken By, (c) = Cosigned By      Initials Name Provider Type    SC Prema, Shearon A, PT Physical Therapist                   Goals/Plan       Row Name  03/01/24 1108          Bed Mobility Goal 1 (PT)    Activity/Assistive Device (Bed Mobility Goal 1, PT) sit to supine/supine to sit  -SC     Bent Level/Cues Needed (Bed Mobility Goal 1, PT) modified independence  -SC     Time Frame (Bed Mobility Goal 1, PT) long term goal (LTG);3 days  -SC     Progress/Outcomes (Bed Mobility Goal 1, PT) goal met  -SC       Row Name 03/01/24 1108          Transfer Goal 1 (PT)    Activity/Assistive Device (Transfer Goal 1, PT) sit-to-stand/stand-to-sit  -SC     Bent Level/Cues Needed (Transfer Goal 1, PT) modified independence  -SC     Time Frame (Transfer Goal 1, PT) long term goal (LTG);3 days  -SC     Progress/Outcome (Transfer Goal 1, PT) goal met  -SC       Row Name 03/01/24 1108          Gait Training Goal 1 (PT)    Activity/Assistive Device (Gait Training Goal 1, PT) gait (walking locomotion)  -SC     Bent Level (Gait Training Goal 1, PT) modified independence  -SC     Distance (Gait Training Goal 1, PT) 150  -SC     Time Frame (Gait Training Goal 1, PT) long term goal (LTG);3 days  -SC     Progress/Outcome (Gait Training Goal 1, PT) goal met  -SC       Row Name 03/01/24 1108          ROM Goal 1 (PT)    ROM Goal 1 (PT) Surgical Knee ROM: 0-90  -SC     Time Frame (ROM Goal 1, PT) long-term goal (LTG);3 days  -SC     Progress/Outcome (ROM Goal 1, PT) goal partially met  -SC               User Key  (r) = Recorded By, (t) = Taken By, (c) = Cosigned By      Initials Name Provider Type    SC Rosibel Lloyd, PT Physical Therapist                   Clinical Impression       Row Name 03/01/24 1106          Pain    Pretreatment Pain Rating 3/10  -SC     Posttreatment Pain Rating 4/10  -SC     Pain Location - Side/Orientation Right  -SC     Pain Location - knee  -SC       Row Name 03/01/24 1106          Plan of Care Review    Plan of Care Reviewed With patient  -SC     Progress improving  -SC     Outcome Evaluation Patient demonstrates safe mobility with walker.  Her pain control in good alowing improving knee ROM. She is going home with home health PT.  -SC       Row Name 03/01/24 1106          Therapy Assessment/Plan (PT)    Patient/Family Therapy Goals Statement (PT) go home  -SC     Rehab Potential (PT) good, to achieve stated therapy goals  -SC     Criteria for Skilled Interventions Met (PT) yes;meets criteria;skilled treatment is necessary  -SC     Therapy Frequency (PT) 2 times/day  -SC       Row Name 03/01/24 1106          Positioning and Restraints    Pre-Treatment Position sitting in chair/recliner  -SC     Post Treatment Position chair  -SC     In Chair notified nsg;reclined;sitting;call light within reach;encouraged to call for assist;exit alarm on;with family/caregiver  -SC               User Key  (r) = Recorded By, (t) = Taken By, (c) = Cosigned By      Initials Name Provider Type    SC Rosibel Lloyd PT Physical Therapist                   Outcome Measures       Row Name 03/01/24 1109 03/01/24 0814       How much help from another person do you currently need...    Turning from your back to your side while in flat bed without using bedrails? 4  -SC 3  -OD    Moving from lying on back to sitting on the side of a flat bed without bedrails? 4  -SC 3  -OD    Moving to and from a bed to a chair (including a wheelchair)? 4  -SC 3  -OD    Standing up from a chair using your arms (e.g., wheelchair, bedside chair)? 4  -SC 3  -OD    Climbing 3-5 steps with a railing? 3  -SC 2  -OD    To walk in hospital room? 4  -SC 3  -OD    AM-PAC 6 Clicks Score (PT) 23  -SC 17  -OD    Highest Level of Mobility Goal 7 --> Walk 25 feet or more  -SC 5 --> Static standing  -OD      Row Name 03/01/24 1109 03/01/24 1035       Functional Assessment    Outcome Measure Options AM-PAC 6 Clicks Basic Mobility (PT)  -SC AM-PAC 6 Clicks Daily Activity (OT)  -JY              User Key  (r) = Recorded By, (t) = Taken By, (c) = Cosigned By      Initials Name Provider Type    Rosibel Andrew PT  Physical Therapist    Rekha Tompkins, RN Registered Nurse    Yulia Melendrez, OT Occupational Therapist                  Physical Therapy Education       Title: PT OT SLP Therapies (In Progress)       Topic: Physical Therapy (Done)       Point: Mobility training (Done)       Learning Progress Summary             Patient Eager, E, VU by SC at 3/1/2024 1109    Comment: reviewed HEP    Acceptance, E,D, VU,NR by  at 2/29/2024 1810                         Point: Home exercise program (Done)       Learning Progress Summary             Patient Eager, E, VU by SC at 3/1/2024 1109    Comment: reviewed HEP    Acceptance, E,D, VU,NR by  at 2/29/2024 1810                         Point: Body mechanics (Done)       Learning Progress Summary             Patient Eager, E, VU by SC at 3/1/2024 1109    Comment: reviewed HEP    Acceptance, E,D, VU,NR by  at 2/29/2024 1810                         Point: Precautions (Done)       Learning Progress Summary             Patient Eager, E, VU by SC at 3/1/2024 1109    Comment: reviewed HEP    Acceptance, E,D, VU,NR by  at 2/29/2024 1810                                         User Key       Initials Effective Dates Name Provider Type Discipline    SC 02/03/23 -  Rosibel Lloyd PT Physical Therapist PT     12/15/23 -  Isaura Lyles PT Physical Therapist PT                  PT Recommendation and Plan     Plan of Care Reviewed With: patient  Progress: improving  Outcome Evaluation: Patient demonstrates safe mobility with walker. Her pain control in good alowing improving knee ROM. She is going home with home health PT.     Time Calculation:         PT Charges       Row Name 03/01/24 0942             Time Calculation    Start Time 1002  -SC      PT Received On 03/01/24  -SC      PT Goal Re-Cert Due Date 03/10/24  -SC         Timed Charges    71698 - PT Therapeutic Exercise Minutes 15  -SC      56517 - Gait Training Minutes  15  -SC         Total Minutes    Timed Charges Total  Minutes 30  -SC       Total Minutes 30  -SC                User Key  (r) = Recorded By, (t) = Taken By, (c) = Cosigned By      Initials Name Provider Type    SC Rosibel Lloyd PT Physical Therapist                  Therapy Charges for Today       Code Description Service Date Service Provider Modifiers Qty    78094057352  PT THER PROC EA 15 MIN 3/1/2024 Rosibel Lloyd, PT GP 1    [id number] HC GAIT TRAINING EA 15 MIN 3/1/2024 Rosibel Lloyd PT GP 1            PT G-Codes  Outcome Measure Options: AM-PAC 6 Clicks Basic Mobility (PT)  AM-PAC 6 Clicks Score (PT): 23  AM-PAC 6 Clicks Score (OT): 21    PT Discharge Summary  Anticipated Discharge Disposition (PT): home with assist, home with home health    Rosibel Lloyd PT  3/1/2024

## 2024-03-01 NOTE — CASE MANAGEMENT/SOCIAL WORK
Continued Stay Note  [facility]     Patient Name: Ro Bonds  MRN: [id number]  Today's Date: 3/1/2024    Admit Date: 2/29/2024    Plan: Home   Discharge Plan       Row Name 03/01/24 1142       Plan    Plan Home    Plan Comments I spoke with patient and her spouse. Patient has spouse, daughter and daughter's family in the home. Patient has the following DME: raised toilet seat.  A rolling walker has been delivered to patient provided by [employer].   [facility] has arranged transitions to home outpatient PT.  Patient has [employer] Medicare.   She has no advanced directives.    Final Discharge Disposition Code 01 - home or self-care                   Discharge Codes    No documentation.                 Expected Discharge Date and Time       Expected Discharge Date Expected Discharge Time    Mar 2, 2024               Chari Valle RN

## 2024-05-03 NOTE — TELEPHONE ENCOUNTER
Caller: Ro Bonds Eileen    Relationship: Self    Best call back number: [phone]     Requested Prescriptions:   Requested Prescriptions     Pending Prescriptions Disp Refills    metoprolol tartrate (LOPRESSOR) 25 MG tablet 180 tablet 1     Sig: Take 1 tablet by mouth 2 (Two) Times a Day. NEEDS APPT FOR FUTURE REFILLS        Pharmacy where request should be sent: [facility] [id number] [address] [phone] [facility] [phone] [facility]     Last office visit with prescribing clinician: 8/22/2022   Last telemedicine visit with prescribing clinician: Visit date not found   Next office visit with prescribing clinician: 8/26/2024     Additional details provided by patient: 10 PILLS REMAINING     Does the patient have less than a 3 day supply:  [] Yes  [x] No    Would you like a call back once the refill request has been completed: [] Yes [x] No    If the office needs to give you a call back, can they leave a voicemail: [] Yes [x] No    Edmundo Summers Rep   05/03/24 11:57 EDT

## 2024-08-26 ENCOUNTER — OFFICE VISIT (OUTPATIENT)
Dept: CARDIOLOGY | Facility: CLINIC | Age: 69
End: 2024-08-26
Payer: MEDICARE

## 2024-08-26 VITALS
DIASTOLIC BLOOD PRESSURE: 90 MMHG | SYSTOLIC BLOOD PRESSURE: 142 MMHG | BODY MASS INDEX: 37.5 KG/M2 | OXYGEN SATURATION: 98 % | HEIGHT: 59 IN | WEIGHT: 186 LBS | HEART RATE: 95 BPM

## 2024-08-26 DIAGNOSIS — R07.2 PRECORDIAL PAIN: ICD-10-CM

## 2024-08-26 DIAGNOSIS — I48.92 PAROXYSMAL ATRIAL FLUTTER: Primary | ICD-10-CM

## 2024-08-26 DIAGNOSIS — R06.09 DYSPNEA ON EXERTION: ICD-10-CM

## 2024-08-26 DIAGNOSIS — I10 ESSENTIAL HYPERTENSION: ICD-10-CM

## 2024-08-26 PROCEDURE — G2211 COMPLEX E/M VISIT ADD ON: HCPCS | Performed by: HOSPITALIST

## 2024-08-26 PROCEDURE — 1160F RVW MEDS BY RX/DR IN RCRD: CPT | Performed by: HOSPITALIST

## 2024-08-26 PROCEDURE — 3080F DIAST BP >= 90 MM HG: CPT | Performed by: HOSPITALIST

## 2024-08-26 PROCEDURE — 3077F SYST BP >= 140 MM HG: CPT | Performed by: HOSPITALIST

## 2024-08-26 PROCEDURE — 1159F MED LIST DOCD IN RCRD: CPT | Performed by: HOSPITALIST

## 2024-08-26 PROCEDURE — 99214 OFFICE O/P EST MOD 30 MIN: CPT | Performed by: HOSPITALIST

## 2024-08-26 RX ORDER — ATORVASTATIN CALCIUM 20 MG/1
20 TABLET, FILM COATED ORAL EVERY MORNING
Qty: 90 TABLET | Refills: 3 | Status: SHIPPED | OUTPATIENT
Start: 2024-08-26

## 2024-08-26 NOTE — PROGRESS NOTES
[facility] Cardiology   [address], [address]  [place], [place], [address]    [phone]  [facility]           OUTPATIENT CLINIC FOLLOW UP NOTE    Patient Care Team:  Patient Care Team:  Dimas Kumari APRN as PCP - General (Family Medicine)  Bob Peralta MD as Consulting Physician (Cardiology)    Subjective:      Chief Complaint   Patient presents with    Palpitations       HPI:    Ro Bonds is a 68 y.o. female.  Cardiac focused, problem list:  Palpitations  Remote LHCx2; negative per patient-data deficit  Remote stress test and heart catheterization approximately 5-10 years ago ([facility]); negative per patient-data deficit  Remote event monitor approximately 2019; patient was told she had occasional fast heart rates-data deficit  CCS class I chest discomfort/NYHA class II dyspnea on exertion symptoms with acceptable electrocardiogram, 2022  Holter monitor 3/2022:  Primary rhythm was sinus rhythm with average heart rate 85 bpm. 0% Afib burden. PVS burden 0.02%.   Hypertension  Hyperlipidemia; on statin therapy  JOSEPH, noncompliant with CPAP  Moderate obesity: BMI 36.33  Neurocardiogenic syncope with remote positive tilt table test-data deficit  Asthma/emphysema  Type 2 diabetes mellitus; hemoglobin A1c 6.7% 2021  Vitamin D deficiency  Iron deficiency anemia  Hypothyroidism, on replacement therapy   Degenerative joint disease   Anxiety  Fatty liver  History of MRSA  History of PIH  Surgical history:   Tonsillectomy   Hysterectomy  Gastric bypass  Cholecystectomy   x2    Patient presents today for follow up.  She has been having exertional chest pain and dyspnea for the last year.  Palpitations are improved.  Does have some but they are mild and intermittent.  Blood pressure elevated in clinic today but averaging 120/70 at home.  She denies lower extremity edema, lightheadedness or syncope.    Review of Systems:  As noted above in the HPI      PFSH:  Patient Active Problem List   Diagnosis    Paroxysmal atrial flutter    Essential hypertension    Hyperlipidemia LDL goal <100    Type 2 diabetes mellitus without complication, without long-term current use of insulin    JOSEPH on CPAP    Status post total shoulder arthroplasty, left    Obesity    Postoperative pain    Wound dehiscence, surgical    Anxiety and depression    Status post incision and drainage, debridement, closure, left shoulder incision    Primary localized osteoarthritis of right knee    Status post right knee replacement         Current Outpatient Medications:     albuterol sulfate  (90 Base) MCG/ACT inhaler, Inhale 2 puffs Every 4 (Four) Hours As Needed for Wheezing or Shortness of Air., Disp: , Rfl:     atorvastatin (LIPITOR) 20 MG tablet, Take 1 tablet by mouth Every Morning., Disp: 90 tablet, Rfl: 3    EPINEPHrine (EPIPEN) 0.3 MG/0.3ML solution auto-injector injection, Inject  into the appropriate muscle as directed by prescriber 1 (One) Time., Disp: , Rfl:     furosemide (LASIX) 40 MG tablet, Take 1 tablet by mouth Every Morning., Disp: , Rfl:     hydrOXYzine (ATARAX) 10 MG tablet, Take 1-3 tablets by mouth Every 4 (Four) Hours As Needed for Anxiety (SLEEP)., Disp: , Rfl:     levothyroxine (SYNTHROID, LEVOTHROID) 88 MCG tablet, Take 1 tablet by mouth Every Morning., Disp: , Rfl:     meloxicam (MOBIC) 15 MG tablet, Take 1 tablet by mouth Every Morning., Disp: , Rfl:     metoprolol tartrate (LOPRESSOR) 25 MG tablet, Take 1 tablet by mouth 2 (Two) Times a Day. NEEDS APPT FOR FUTURE REFILLS, Disp: 180 tablet, Rfl: 1    montelukast (SINGULAIR) 10 MG tablet, Take 1 tablet by mouth Every Night., Disp: , Rfl:     potassium chloride ER (K-TAB) 20 MEQ tablet controlled-release ER tablet, Take 1 tablet by mouth Every Morning., Disp: , Rfl:     spironolactone (ALDACTONE) 25 MG tablet, Take 1 tablet by mouth Every Morning., Disp: , Rfl:     traZODone (DESYREL) 50 MG tablet, Take 1 tablet by  "mouth Every Night., Disp: , Rfl:     vitamin D (ERGOCALCIFEROL) 1.25 MG (31336 UT) capsule capsule, Take 1 capsule by mouth Every 7 (Seven) Days. SUNDAYS, Disp: , Rfl:     oxyCODONE (Roxicodone) 5 MG immediate release tablet, Take 1 tablet by mouth Every 4 (Four) Hours As Needed for Moderate Pain. (Patient not taking: Reported on 8/26/2024), Disp: 40 tablet, Rfl: 0     reports that she has never smoked. She has never used smokeless tobacco.      Objective:   Physical exam:  /90 (BP Location: Left arm, Patient Position: Sitting, Cuff Size: Adult)   Pulse 95   Ht 149.9 cm (59.02\")   Wt 84.4 kg (186 lb)   LMP 05/31/1995 (Exact Date) Comment: LMP ~ AGE 40  SpO2 98%   BMI 37.55 kg/m²   CONSTITUTIONAL: No acute distress  RESPIRATORY: Normal effort. Clear to auscultation bilaterally without wheezing or rales  CARDIOVASCULAR: Regular rate and rhythm with normal S1 and S2. Without murmur.  PERIPHERAL VASCULAR: Normal radial pulse. There is no significant lower extremity edema bilaterally.    Labs:    No results found for: \"LDL\"  No components found for: \"LDLDIRECTC\"    PCP labs 8/1/2024:  CBC: WBC 7.2, Hgb 13.4, HCT 42.0,   Lipid panel: , , HDL 41,   Hemoglobin A1c 7.3  CMP: , K5.0, , BUN 18, creatinine 0.99, ALT 7, AST 15    Diagnostic Data:    Procedures    Results for orders placed during the hospital encounter of 03/17/22    Adult Transthoracic Echo Complete w/ Color, Spectral and Contrast if necessary per protocol    Interpretation Summary  · Estimated left ventricular EF = 56% Estimated left ventricular EF was in agreement with the calculated left ventricular EF. Left ventricular ejection fraction appears to be 56 - 60%. Left ventricular systolic function is normal.  · Estimated right ventricular systolic pressure from tricuspid regurgitation is normal (<35 mmHg).  · Left ventricular diastolic function is consistent with (grade I) impaired relaxation.  · Normal right " ventricular cavity size, wall thickness and septal motion noted with mildly reduced right ventricular systolic function.  · No evidence of pulmonary hypertension is present.  · There is no evidence of pericardial effusion.  · No significant structural or functional valvular abnormalities demonstrated.    Holter monitor 3/24/2022  Primary rhythm was sinus rhythm with average heart rate 85 bpm.   0% Afib burden.   PVC burden 0.02%.      Assessment and Plan:     Chest tightness  Dyspnea on exertion  -Symptom pattern concerning for angina.  Patient unable to walk on treadmill due to musculoskeletal problems.  Walks with a cane.  -Recommend Lexiscan nuclear stress test to evaluate for evidence of ischemia.  -Echocardiogram to evaluate for structural heart disease.    Palpitations  -Stable symptoms  -Continue Lopressor, has been taking 1 tablet daily.  Will formally switch to Lopressor 12.5 mg twice daily.  -Not switching to Toprol due to prior gastric bypass  -Recommended increased cardiac exercise    Essential hypertension  -Blood pressure stable at home  -Continue current medications with the above change to Lopressor    Hyperlipidemia LDL goal <100   -Most recent , 8/2024.  -Restart atorvastatin 20 mg daily.  Repeat lipid panel in 8 to 12 weeks.    - Return in about 1 year (around 8/26/2025) for Next scheduled follow up with Dr. Peralta .    Electronically signed by YISEL Bettencourt, 08/26/24, 11:08 AM EDT.

## 2024-09-16 ENCOUNTER — HOSPITAL ENCOUNTER (OUTPATIENT)
Dept: CARDIOLOGY | Facility: HOSPITAL | Age: 69
Discharge: HOME | End: 2024-09-16
Payer: MEDICARE

## 2024-09-16 VITALS
DIASTOLIC BLOOD PRESSURE: 99 MMHG | BODY MASS INDEX: 37.51 KG/M2 | WEIGHT: 186.07 LBS | HEIGHT: 59 IN | SYSTOLIC BLOOD PRESSURE: 179 MMHG

## 2024-09-16 DIAGNOSIS — R07.2 PRECORDIAL PAIN: ICD-10-CM

## 2024-09-16 DIAGNOSIS — R06.09 DYSPNEA ON EXERTION: ICD-10-CM

## 2024-09-16 LAB
ASCENDING AORTA: 3.6 CM
BH CV ECHO MEAS - AO MAX PG: 3.4 MMHG
BH CV ECHO MEAS - AO MEAN PG: 2 MMHG
BH CV ECHO MEAS - AO ROOT DIAM: 3 CM
BH CV ECHO MEAS - AO V2 MAX: 92.8 CM/SEC
BH CV ECHO MEAS - EF(MOD-BP): 51.1 %
BH CV ECHO MEAS - IVS/LVPW: 1 CM
BH CV ECHO MEAS - IVSD: 1 CM
BH CV ECHO MEAS - LA DIMENSION: 3.3 CM
BH CV ECHO MEAS - LAT PEAK E' VEL: 7.7 CM/SEC
BH CV ECHO MEAS - LV MAX PG: 3.3 MMHG
BH CV ECHO MEAS - LV MEAN PG: 1.7 MMHG
BH CV ECHO MEAS - LV V1 MAX: 90.5 CM/SEC
BH CV ECHO MEAS - LV V1 VTI: 20.6 CM
BH CV ECHO MEAS - LVIDD: 3.7 CM
BH CV ECHO MEAS - LVIDS: 2.6 CM
BH CV ECHO MEAS - LVOT DIAM: 1.9 CM
BH CV ECHO MEAS - LVPWD: 1 CM
BH CV ECHO MEAS - MED PEAK E' VEL: 6.9 CM/SEC
BH CV ECHO MEAS - MV A MAX VEL: 95.4 CM/SEC
BH CV ECHO MEAS - MV E MAX VEL: 90.6 CM/SEC
BH CV ECHO MEAS - MV E/A: 0.95
BH CV ECHO MEAS - MV MAX PG: 4 MMHG
BH CV ECHO MEAS - MV MEAN PG: 1.6 MMHG
BH CV ECHO MEAS - MV P1/2T: 75 MSEC
BH CV ECHO MEAS - RAP SYSTOLE: 3 MMHG
BH CV ECHO MEAS - RVSP: 22 MMHG
BH CV ECHO MEAS - TAPSE (>1.6): 1.53 CM
BH CV ECHO MEAS - TR MAX PG: 19.1 MMHG
BH CV ECHO MEAS - TR MAX VEL: 208.3 CM/SEC
BH CV ECHO MEASUREMENTS AVERAGE E/E' RATIO: 12.41
BH CV REST NUCLEAR ISOTOPE DOSE: 9.7 MCI
BH CV STRESS BP STAGE 2: NORMAL
BH CV STRESS BP STAGE 4: NORMAL
BH CV STRESS COMMENTS STAGE 1: NORMAL
BH CV STRESS DOSE REGADENOSON STAGE 1: 0.4
BH CV STRESS DURATION MIN STAGE 1: 1
BH CV STRESS DURATION MIN STAGE 2: 1
BH CV STRESS DURATION MIN STAGE 3: 1
BH CV STRESS DURATION MIN STAGE 4: 1
BH CV STRESS DURATION SEC STAGE 1: 0
BH CV STRESS DURATION SEC STAGE 2: 0
BH CV STRESS DURATION SEC STAGE 3: 0
BH CV STRESS DURATION SEC STAGE 4: 0
BH CV STRESS HR STAGE 1: 78
BH CV STRESS HR STAGE 2: 96
BH CV STRESS HR STAGE 3: 89
BH CV STRESS HR STAGE 4: 85
BH CV STRESS NUCLEAR ISOTOPE DOSE: 32.8 MCI
BH CV STRESS O2 STAGE 1: 99
BH CV STRESS O2 STAGE 2: 100
BH CV STRESS O2 STAGE 3: 100
BH CV STRESS O2 STAGE 4: 100
BH CV STRESS PROTOCOL 1: NORMAL
BH CV STRESS RECOVERY BP: NORMAL MMHG
BH CV STRESS RECOVERY HR: 84 BPM
BH CV STRESS RECOVERY O2: 100 %
BH CV STRESS STAGE 1: 1
BH CV STRESS STAGE 2: 2
BH CV STRESS STAGE 3: 3
BH CV STRESS STAGE 4: 4
BH CV XLRA - RV BASE: 3.6 CM
BH CV XLRA - RV LENGTH: 5.7 CM
BH CV XLRA - RV MID: 2.7 CM
BH CV XLRA - TDI S': 8.8 CM/SEC
LEFT ATRIUM VOLUME INDEX: 24.6 ML/M2
LV EF NUC BP: 75 %
MAXIMAL PREDICTED HEART RATE: 149 BPM
PERCENT MAX PREDICTED HR: 67.11 %
STRESS BASELINE BP: NORMAL MMHG
STRESS BASELINE HR: 63 BPM
STRESS O2 SAT REST: 98 %
STRESS PERCENT HR: 79 %
STRESS POST ESTIMATED WORKLOAD: 1 METS
STRESS POST EXERCISE DUR MIN: 4 MIN
STRESS POST EXERCISE DUR SEC: 0 SEC
STRESS POST O2 SAT PEAK: 100 %
STRESS POST PEAK BP: NORMAL MMHG
STRESS POST PEAK HR: 100 BPM
STRESS TARGET HR: 127 BPM

## 2024-09-16 PROCEDURE — 93306 TTE W/DOPPLER COMPLETE: CPT

## 2024-09-16 PROCEDURE — 78452 HT MUSCLE IMAGE SPECT MULT: CPT

## 2024-09-16 PROCEDURE — 78452 HT MUSCLE IMAGE SPECT MULT: CPT | Performed by: INTERNAL MEDICINE

## 2024-09-16 PROCEDURE — 25010000002 REGADENOSON 0.4 MG/5ML SOLUTION: Performed by: HOSPITALIST

## 2024-09-16 PROCEDURE — 93018 CV STRESS TEST I&R ONLY: CPT | Performed by: INTERNAL MEDICINE

## 2024-09-16 PROCEDURE — 0 TECHNETIUM SESTAMIBI: Performed by: HOSPITALIST

## 2024-09-16 PROCEDURE — 93306 TTE W/DOPPLER COMPLETE: CPT | Performed by: INTERNAL MEDICINE

## 2024-09-16 PROCEDURE — A9500 TC99M SESTAMIBI: HCPCS | Performed by: HOSPITALIST

## 2024-09-16 PROCEDURE — 93017 CV STRESS TEST TRACING ONLY: CPT

## 2024-09-16 RX ORDER — REGADENOSON 0.08 MG/ML
0.4 INJECTION, SOLUTION INTRAVENOUS ONCE
Status: COMPLETED | OUTPATIENT
Start: 2024-09-16 | End: 2024-09-16

## 2024-09-16 RX ORDER — CAFFEINE CITRATE 20 MG/ML
60 SOLUTION INTRAVENOUS ONCE
Status: COMPLETED | OUTPATIENT
Start: 2024-09-16 | End: 2024-09-16

## 2024-09-16 RX ADMIN — TECHNETIUM TC 99M SESTAMIBI 1 DOSE: 1 INJECTION INTRAVENOUS at 08:20

## 2024-09-16 RX ADMIN — CAFFEINE CITRATE 60 MG: 20 INJECTION, SOLUTION INTRAVENOUS at 10:55

## 2024-09-16 RX ADMIN — REGADENOSON 0.4 MG: 0.08 INJECTION, SOLUTION INTRAVENOUS at 09:52

## 2024-09-16 RX ADMIN — TECHNETIUM TC 99M SESTAMIBI 1 DOSE: 1 INJECTION INTRAVENOUS at 09:50

## 2024-09-17 ENCOUNTER — TELEPHONE (OUTPATIENT)
Dept: CARDIOLOGY | Facility: CLINIC | Age: 69
End: 2024-09-17
Payer: MEDICARE

## 2024-10-02 ENCOUNTER — TRANSCRIBE ORDERS (OUTPATIENT)
Dept: ADMINISTRATIVE | Facility: HOSPITAL | Age: 69
End: 2024-10-02
Payer: MEDICARE

## 2024-10-02 DIAGNOSIS — Z12.31 VISIT FOR SCREENING MAMMOGRAM: Primary | ICD-10-CM

## 2024-10-09 ENCOUNTER — HOSPITAL ENCOUNTER (OUTPATIENT)
Dept: MAMMOGRAPHY | Facility: HOSPITAL | Age: 69
Discharge: HOME | End: 2024-10-09
Admitting: NURSE PRACTITIONER
Payer: MEDICARE

## 2024-10-09 DIAGNOSIS — Z12.31 VISIT FOR SCREENING MAMMOGRAM: ICD-10-CM

## 2024-10-09 LAB
NCCN CRITERIA FLAG: NORMAL
TYRER CUZICK SCORE: 3.2

## 2024-10-09 PROCEDURE — 77063 BREAST TOMOSYNTHESIS BI: CPT

## 2024-10-09 PROCEDURE — 77067 SCR MAMMO BI INCL CAD: CPT

## 2024-11-14 RX ORDER — METOPROLOL TARTRATE 25 MG/1
TABLET, FILM COATED ORAL
Qty: 180 TABLET | Refills: 1 | Status: SHIPPED | OUTPATIENT
Start: 2024-11-14

## 2025-03-27 ENCOUNTER — APPOINTMENT (OUTPATIENT)
Dept: GENERAL RADIOLOGY | Facility: HOSPITAL | Age: 70
End: 2025-03-27
Payer: MEDICARE

## 2025-03-27 ENCOUNTER — APPOINTMENT (OUTPATIENT)
Dept: CT IMAGING | Facility: HOSPITAL | Age: 70
End: 2025-03-27
Payer: MEDICARE

## 2025-03-27 ENCOUNTER — HOSPITAL ENCOUNTER (EMERGENCY)
Facility: HOSPITAL | Age: 70
Discharge: HOME | End: 2025-03-27
Attending: EMERGENCY MEDICINE
Payer: MEDICARE

## 2025-03-27 VITALS
SYSTOLIC BLOOD PRESSURE: 152 MMHG | HEART RATE: 86 BPM | TEMPERATURE: 98.8 F | RESPIRATION RATE: 18 BRPM | OXYGEN SATURATION: 95 % | BODY MASS INDEX: 37.29 KG/M2 | HEIGHT: 59 IN | DIASTOLIC BLOOD PRESSURE: 89 MMHG | WEIGHT: 185 LBS

## 2025-03-27 DIAGNOSIS — M54.2 ACUTE NECK PAIN: ICD-10-CM

## 2025-03-27 DIAGNOSIS — E11.65 HYPERGLYCEMIA DUE TO DIABETES MELLITUS: ICD-10-CM

## 2025-03-27 DIAGNOSIS — M25.512 ACUTE PAIN OF LEFT SHOULDER: Primary | ICD-10-CM

## 2025-03-27 LAB
ANION GAP SERPL CALCULATED.3IONS-SCNC: 16 MMOL/L (ref 5–15)
BASOPHILS # BLD AUTO: 0.03 10*3/MM3 (ref 0–0.2)
BASOPHILS NFR BLD AUTO: 0.3 % (ref 0–1.5)
BUN SERPL-MCNC: 13 MG/DL (ref 8–23)
BUN/CREAT SERPL: 17.1 (ref 7–25)
CALCIUM SPEC-SCNC: 9.1 MG/DL (ref 8.6–10.5)
CHLORIDE SERPL-SCNC: 103 MMOL/L (ref 98–107)
CK SERPL-CCNC: 39 U/L (ref 20–180)
CO2 SERPL-SCNC: 22 MMOL/L (ref 22–29)
CREAT SERPL-MCNC: 0.76 MG/DL (ref 0.57–1)
DEPRECATED RDW RBC AUTO: 37.7 FL (ref 37–54)
EGFRCR SERPLBLD CKD-EPI 2021: 83.9 ML/MIN/1.73
EOSINOPHIL # BLD AUTO: 0.06 10*3/MM3 (ref 0–0.4)
EOSINOPHIL NFR BLD AUTO: 0.6 % (ref 0.3–6.2)
ERYTHROCYTE [DISTWIDTH] IN BLOOD BY AUTOMATED COUNT: 13.2 % (ref 12.3–15.4)
GLUCOSE SERPL-MCNC: 191 MG/DL (ref 65–99)
HCT VFR BLD AUTO: 40.9 % (ref 34–46.6)
HGB BLD-MCNC: 13 G/DL (ref 12–15.9)
IMM GRANULOCYTES # BLD AUTO: 0.05 10*3/MM3 (ref 0–0.05)
IMM GRANULOCYTES NFR BLD AUTO: 0.5 % (ref 0–0.5)
LYMPHOCYTES # BLD AUTO: 1.12 10*3/MM3 (ref 0.7–3.1)
LYMPHOCYTES NFR BLD AUTO: 11.6 % (ref 19.6–45.3)
MAGNESIUM SERPL-MCNC: 2 MG/DL (ref 1.6–2.4)
MCH RBC QN AUTO: 25.4 PG (ref 26.6–33)
MCHC RBC AUTO-ENTMCNC: 31.8 G/DL (ref 31.5–35.7)
MCV RBC AUTO: 79.9 FL (ref 79–97)
MONOCYTES # BLD AUTO: 0.63 10*3/MM3 (ref 0.1–0.9)
MONOCYTES NFR BLD AUTO: 6.5 % (ref 5–12)
NEUTROPHILS NFR BLD AUTO: 7.78 10*3/MM3 (ref 1.7–7)
NEUTROPHILS NFR BLD AUTO: 80.5 % (ref 42.7–76)
NRBC BLD AUTO-RTO: 0 /100 WBC (ref 0–0.2)
PLATELET # BLD AUTO: 305 10*3/MM3 (ref 140–450)
PMV BLD AUTO: 10.5 FL (ref 6–12)
POTASSIUM SERPL-SCNC: 3.9 MMOL/L (ref 3.5–5.2)
QT INTERVAL: 300 MS
QTC INTERVAL: 396 MS
RBC # BLD AUTO: 5.12 10*6/MM3 (ref 3.77–5.28)
SODIUM SERPL-SCNC: 141 MMOL/L (ref 136–145)
TROPONIN T SERPL HS-MCNC: 9 NG/L
WBC NRBC COR # BLD AUTO: 9.67 10*3/MM3 (ref 3.4–10.8)

## 2025-03-27 PROCEDURE — 72125 CT NECK SPINE W/O DYE: CPT

## 2025-03-27 PROCEDURE — 83735 ASSAY OF MAGNESIUM: CPT | Performed by: EMERGENCY MEDICINE

## 2025-03-27 PROCEDURE — 99284 EMERGENCY DEPT VISIT MOD MDM: CPT

## 2025-03-27 PROCEDURE — 71045 X-RAY EXAM CHEST 1 VIEW: CPT

## 2025-03-27 PROCEDURE — 36415 COLL VENOUS BLD VENIPUNCTURE: CPT

## 2025-03-27 PROCEDURE — 82550 ASSAY OF CK (CPK): CPT | Performed by: EMERGENCY MEDICINE

## 2025-03-27 PROCEDURE — 63710000001 ONDANSETRON ODT 4 MG TABLET DISPERSIBLE: Performed by: EMERGENCY MEDICINE

## 2025-03-27 PROCEDURE — 73030 X-RAY EXAM OF SHOULDER: CPT

## 2025-03-27 PROCEDURE — 85025 COMPLETE CBC W/AUTO DIFF WBC: CPT | Performed by: EMERGENCY MEDICINE

## 2025-03-27 PROCEDURE — 93005 ELECTROCARDIOGRAM TRACING: CPT | Performed by: EMERGENCY MEDICINE

## 2025-03-27 PROCEDURE — 80048 BASIC METABOLIC PNL TOTAL CA: CPT | Performed by: EMERGENCY MEDICINE

## 2025-03-27 PROCEDURE — 84484 ASSAY OF TROPONIN QUANT: CPT | Performed by: EMERGENCY MEDICINE

## 2025-03-27 RX ORDER — CYCLOBENZAPRINE HCL 10 MG
10 TABLET ORAL 3 TIMES DAILY PRN
Qty: 30 TABLET | Refills: 0 | Status: SHIPPED | OUTPATIENT
Start: 2025-03-27

## 2025-03-27 RX ORDER — ONDANSETRON 4 MG/1
4 TABLET, ORALLY DISINTEGRATING ORAL ONCE
Status: COMPLETED | OUTPATIENT
Start: 2025-03-27 | End: 2025-03-27

## 2025-03-27 RX ORDER — OXYCODONE AND ACETAMINOPHEN 5; 325 MG/1; MG/1
1 TABLET ORAL EVERY 6 HOURS PRN
Qty: 12 TABLET | Refills: 0 | Status: SHIPPED | OUTPATIENT
Start: 2025-03-27

## 2025-03-27 RX ORDER — ONDANSETRON 2 MG/ML
4 INJECTION INTRAMUSCULAR; INTRAVENOUS ONCE
Status: DISCONTINUED | OUTPATIENT
Start: 2025-03-27 | End: 2025-03-27

## 2025-03-27 RX ORDER — OXYCODONE AND ACETAMINOPHEN 5; 325 MG/1; MG/1
1 TABLET ORAL ONCE
Refills: 0 | Status: COMPLETED | OUTPATIENT
Start: 2025-03-27 | End: 2025-03-27

## 2025-03-27 RX ORDER — CYCLOBENZAPRINE HCL 10 MG
10 TABLET ORAL ONCE
Status: COMPLETED | OUTPATIENT
Start: 2025-03-27 | End: 2025-03-27

## 2025-03-27 RX ORDER — MELOXICAM 7.5 MG/1
7.5 TABLET ORAL DAILY
Qty: 14 TABLET | Refills: 0 | Status: SHIPPED | OUTPATIENT
Start: 2025-03-27

## 2025-03-27 RX ADMIN — OXYCODONE HYDROCHLORIDE AND ACETAMINOPHEN 1 TABLET: 5; 325 TABLET ORAL at 05:09

## 2025-03-27 RX ADMIN — ONDANSETRON 4 MG: 4 TABLET, ORALLY DISINTEGRATING ORAL at 05:09

## 2025-03-27 RX ADMIN — CYCLOBENZAPRINE HYDROCHLORIDE 10 MG: 10 TABLET, FILM COATED ORAL at 05:09

## 2025-03-27 NOTE — ED PROVIDER NOTES
Subjective   History of Present Illness  69 year old female with history of HTN, HLD, sleep apnea, and diet controlled type 2 diabetes mellitus status post gastric bypass surgery previously presents to the emergency department accompanied by her spouse with concerns about 10/10 left shoulder pain radiating to her left neck and left arm to her elbow. Pain is worse with movement and improved with local heat therapy. She denies injury or trauma, and denies numbness or weakness to her left upper extremity. Pain has been present for approximately two days and had improved initially with heat therapy, but then worsened yesterday. She didn't get a new pillow recently.  She reports allergic reaction to ibuprofen, but has tolerated meloxicam in the past, but hasn't taken it recently.      Review of Systems   Constitutional:  Negative for diaphoresis and fever.   HENT:  Negative for facial swelling and nosebleeds.    Eyes:  Negative for photophobia and discharge.   Respiratory:  Negative for stridor.    Cardiovascular:  Negative for chest pain.   Musculoskeletal:  Positive for arthralgias and neck pain.   Neurological:  Negative for speech difficulty.       Past Medical History:   Diagnosis Date    Anxiety and depression     Arthritis     Asthma     COPD (chronic obstructive pulmonary disease) 96190    Diabetes mellitus     Disease of thyroid gland     Elevated cholesterol     Hyperlipidemia     Hypertension     MRSA infection     HX    Posttraumatic seroma     after Abdominoplasty    Sleep apnea     NOT USING CPAP    Status post incision and drainage, debridement, closure, left shoulder incision 11/29/2022   She had a brain tumor (per patient) which was evaluated at [facility] and it was being followed for quite some time, but she hasn't followed up about it because it causes her anxiety to get to the appointments for surveillance. It was in a non-resectable location per patient.     Allergies   Allergen Reactions     "Amoxicillin Shortness Of Breath and Itching    Macrodantin [Nitrofurantoin] Hives     Drug-induced liver failure \"shut my liver down\"    Penicillins Shortness Of Breath and Itching    Sulfa Antibiotics Shortness Of Breath and Swelling     Swelling of mouth, throat and tongue    Macrolides And Ketolides Hives     LIVER FAILURE    Estradiol Rash     Severe abdominal pains    Ibuprofen Hives    New Skin Hives     Skin glue or dermabond    Progestins Nausea And Vomiting       Past Surgical History:   Procedure Laterality Date    ABDOMINOPLASTY      with excesss skin removal    BREAST EXCISIONAL BIOPSY Bilateral     SCARS GONE AFTER BREAST REDUCTION    CARDIAC CATHETERIZATION      no interventions     SECTION      x2    CHOLECYSTECTOMY      COLONOSCOPY      EAR TUBES      GASTRIC BYPASS      HYSTERECTOMY      AGE 40 complete    INCISION AND DRAINAGE ARM Left 2022    Procedure: Incision and drainage superficial wound dehiscence with debridement, irrigation and closure;  Surgeon: Oneil Jones MD;  Location:  [facility];  Service: Orthopedics;  Laterality: Left;    REDUCTION MAMMAPLASTY Bilateral     TONSILLECTOMY      TOTAL KNEE ARTHROPLASTY Right 2024    Procedure: TOTAL KNEE ARTHROPLASTY WITH CORI ROBOT - RIGHT;  Surgeon: Henry Machado MD;  Location:  [facility];  Service: [facility] - Ortho;  Laterality: Right;    TOTAL SHOULDER ARTHROPLASTY Left 10/31/2022    Procedure: TOTAL SHOULDER ARTHROPLASTY, BICEPS TENODESIS - LEFT;  Surgeon: Oneil Jones MD;  Location: [facility];  Service: Orthopedics;  Laterality: Left;    VEIN SURGERY  [id number]       Family History   Adopted: Yes   Problem Relation Age of Onset    No Known Problems Sister     No Known Problems Brother     No Known Problems Maternal Aunt     No Known Problems Paternal Aunt     No Known Problems Maternal Grandmother     No Known Problems Paternal Grandmother     No Known Problems Daughter     No Known Problems Son  "       Social History     Socioeconomic History    Marital status:    Tobacco Use    Smoking status: Never    Smokeless tobacco: Never   Vaping Use    Vaping status: Never Used   Substance and Sexual Activity    Alcohol use: Yes     Alcohol/week: 1.0 standard drink of alcohol     Types: 1 Shots of liquor per week     Comment: occas    Drug use: Never    Sexual activity: Not Currently     Partners: Female     Birth control/protection: Post-menopausal, Hysterectomy           Objective   Physical Exam  Vitals and nursing note reviewed.   Constitutional:       Appearance: She is not diaphoretic.      Comments: BMI 37. Appears uncomfortable.   Eyes:      General: No scleral icterus.     Comments: No photophobia.   Neck:      Comments: No midline cervical spine tenderness or step off. Left cervical paravertebral tenderness. Left trapezius tenderness.  Cardiovascular:      Rate and Rhythm: Regular rhythm. Tachycardia present.      Heart sounds: No murmur heard.     No friction rub. No gallop.      Comments: S1, S2, mild regular tachycardia.  Pulmonary:      Effort: Pulmonary effort is normal. No respiratory distress.      Breath sounds: Normal breath sounds. No stridor. No wheezing, rhonchi or rales.      Comments: Good air entry.  Musculoskeletal:      Comments: Nontender bony prominences left shoulder and elbow. Left upper extremity distal neurovascular intact. Pain with passive or active range of motion of left glenohumeral joint.    Skin:     General: Skin is warm and dry.   Neurological:      Mental Status: She is alert.      Comments: Normal speech, no dysarthria. Motor 5/5  bilateral upper extremities, symmetric. Sensation grossly intact to light touch.                     ED Course  ED Course as of 03/31/25 1317   Thu Mar 27, 2025   0415 132/84 [LD]   0612 Pain improved. 3/10 in intensity. Results and plan discussed with the patient and her  at the bedside. All questions addressed.  [LD]      ED  Course User Index  [LD] Dari Peratla MD KASPER reviewed by Dari Peralta MD       Medical Decision Making  Problems Addressed:  Acute neck pain: complicated acute illness or injury  Acute pain of left shoulder: complicated acute illness or injury  Hyperglycemia due to diabetes mellitus: complicated acute illness or injury    Amount and/or Complexity of Data Reviewed  Labs: ordered. Decision-making details documented in ED Course.  Radiology: ordered. Decision-making details documented in ED Course.  ECG/medicine tests: ordered and independent interpretation performed. Decision-making details documented in ED Course.     Details: EKG at 0345 shows sinus tachycardia at a rate of 105 beats per minute, normal intervals, no acute ischemic changes.    Risk  Prescription drug management.      Recent Results (from the past 24 hours)   ECG 12 Lead Other; Left sided arm pain    Collection Time: 03/27/25  3:45 AM   Result Value Ref Range    QT Interval 300 ms    QTC Interval 396 ms   Basic Metabolic Panel    Collection Time: 03/27/25  4:44 AM    Specimen: Arm, Right; Blood   Result Value Ref Range    Glucose 191 (H) 65 - 99 mg/dL    BUN 13 8 - 23 mg/dL    Creatinine 0.76 0.57 - 1.00 mg/dL    Sodium 141 136 - 145 mmol/L    Potassium 3.9 3.5 - 5.2 mmol/L    Chloride 103 98 - 107 mmol/L    CO2 22.0 22.0 - 29.0 mmol/L    Calcium 9.1 8.6 - 10.5 mg/dL    BUN/Creatinine Ratio 17.1 7.0 - 25.0    Anion Gap 16.0 (H) 5.0 - 15.0 mmol/L    eGFR 83.9 >60.0 mL/min/1.73   High Sensitivity Troponin T    Collection Time: 03/27/25  4:44 AM    Specimen: Arm, Right; Blood   Result Value Ref Range    HS Troponin T 9 <14 ng/L   Magnesium    Collection Time: 03/27/25  4:44 AM    Specimen: Arm, Right; Blood   Result Value Ref Range    Magnesium 2.0 1.6 - 2.4 mg/dL   CK    Collection Time: 03/27/25  4:44 AM    Specimen: Arm, Right; Blood   Result Value Ref Range    Creatine Kinase 39 20 - 180 U/L    CBC Auto Differential    Collection Time: 03/27/25  4:44 AM    Specimen: Arm, Right; Blood   Result Value Ref Range    WBC 9.67 3.40 - 10.80 10*3/mm3    RBC 5.12 3.77 - 5.28 10*6/mm3    Hemoglobin 13.0 12.0 - 15.9 g/dL    Hematocrit 40.9 34.0 - 46.6 %    MCV 79.9 79.0 - 97.0 fL    MCH 25.4 (L) 26.6 - 33.0 pg    MCHC 31.8 31.5 - 35.7 g/dL    RDW 13.2 12.3 - 15.4 %    RDW-SD 37.7 37.0 - 54.0 fl    MPV 10.5 6.0 - 12.0 fL    Platelets 305 140 - 450 10*3/mm3    Neutrophil % 80.5 (H) 42.7 - 76.0 %    Lymphocyte % 11.6 (L) 19.6 - 45.3 %    Monocyte % 6.5 5.0 - 12.0 %    Eosinophil % 0.6 0.3 - 6.2 %    Basophil % 0.3 0.0 - 1.5 %    Immature Grans % 0.5 0.0 - 0.5 %    Neutrophils, Absolute 7.78 (H) 1.70 - 7.00 10*3/mm3    Lymphocytes, Absolute 1.12 0.70 - 3.10 10*3/mm3    Monocytes, Absolute 0.63 0.10 - 0.90 10*3/mm3    Eosinophils, Absolute 0.06 0.00 - 0.40 10*3/mm3    Basophils, Absolute 0.03 0.00 - 0.20 10*3/mm3    Immature Grans, Absolute 0.05 0.00 - 0.05 10*3/mm3    nRBC 0.0 0.0 - 0.2 /100 WBC     Note: In addition to lab results from this visit, the labs listed above may include labs taken at another facility or during a different encounter within the last 24 hours. Please correlate lab times with ED admission and discharge times for further clarification of the services performed during this visit.    CT Cervical Spine Without Contrast   Final Result   Impression:   No acute osseous abnormality. Mild to moderate multilevel degenerative changes are present throughout the spine with no evidence of bony canal stenosis. Mild multilevel neuroforaminal narrowing is present. No severe stenosis at any level.                  Electronically Signed: Sue Kennedy MD     3/27/2025 5:51 AM EDT     Workstation ID: [id number]      XR Shoulder 2+ View Left   Final Result   Impression:   1.No acute cardiopulmonary process.   2.No acute osseous abnormality of the left shoulder. Left shoulder arthroplasty present which appears intact.                Electronically Signed: Sue Kennedy MD     3/27/2025 4:39 AM EDT     Workstation ID: [id number]      XR Chest 1 View   Final Result   Impression:   1.No acute cardiopulmonary process.   2.No acute osseous abnormality of the left shoulder. Left shoulder arthroplasty present which appears intact.               Electronically Signed: Sue Kennedy MD     3/27/2025 4:39 AM EDT     Workstation ID: [id number]        Vitals:    03/27/25 0430 03/27/25 0500 03/27/25 0530 03/27/25 0600   BP: 157/100 150/88 147/94 152/89   BP Location:       Patient Position:       Pulse: 110 100 99 86   Resp:       Temp:       TempSrc:       SpO2: 99% 98% 94% 95%   Weight:       Height:         Medications   oxyCODONE-acetaminophen (PERCOCET) 5-325 MG per tablet 1 tablet (1 tablet Oral Given 3/27/25 0509)   cyclobenzaprine (FLEXERIL) tablet 10 mg (10 mg Oral Given 3/27/25 0509)   ondansetron ODT (ZOFRAN-ODT) disintegrating tablet 4 mg (4 mg Oral Given 3/27/25 0509)     ECG/EMG Results (last 24 hours)       ** No results found for the last 24 hours. **          ECG 12 Lead Other; Left sided arm pain   Preliminary Result   Test Reason : Other~   Blood Pressure :   */*   mmHG   Vent. Rate : 105 BPM     Atrial Rate : 105 BPM      P-R Int : 164 ms          QRS Dur :  72 ms       QT Int : 300 ms       P-R-T Axes :  19 -38  37 degrees     QTcB Int : 396 ms      Sinus tachycardia   Left axis deviation   Anterior infarct , age undetermined   Abnormal ECG   When compared with ECG of 16-Feb-2024 11:13,   Vent. rate has increased by  47 bpm   Nonspecific T wave abnormality no longer evident in Inferior leads   Nonspecific T wave abnormality, improved in Anterior leads   Nonspecific T wave abnormality now evident in Lateral leads      Referred By: ED MD           Confirmed By:               Final diagnoses:   Acute pain of left shoulder   Acute neck pain   Hyperglycemia due to diabetes mellitus       ED Disposition  ED Disposition       ED  Disposition   Discharge    Condition   Stable    Comment   --               Dimas Kumari, APRN  [address]  [address]  [place] [address] [phone]    Schedule an appointment as soon as possible for a visit in 3 days  primary care provider. You may benefit from physical therapy.    Consider follow up with your shoulder surgeon if pain persists.    In 1 week           Medication List        New Prescriptions      cyclobenzaprine 10 MG tablet  Commonly known as: FLEXERIL  Take 1 tablet by mouth 3 (Three) Times a Day As Needed for Muscle Spasms.     naloxone 4 MG/0.1ML nasal spray  Commonly known as: NARCAN  Call 911. Don't prime. [place] in 1 nostril for overdose. Repeat in 2-3 minutes in other nostril if no or minimal breathing/responsiveness.     oxyCODONE-acetaminophen 5-325 MG per tablet  Commonly known as: PERCOCET  Take 1 tablet by mouth Every 6 (Six) Hours As Needed for Severe Pain.            Changed      * meloxicam 15 MG tablet  Commonly known as: MOBIC  What changed: Another medication with the same name was added. Make sure you understand how and when to take each.     * meloxicam 7.5 MG tablet  Commonly known as: MOBIC  Take 1 tablet by mouth Daily.  What changed: You were already taking a medication with the same name, and this prescription was added. Make sure you understand how and when to take each.           * This list has 2 medication(s) that are the same as other medications prescribed for you. Read the directions carefully, and ask your doctor or other care provider to review them with you.                   Where to Get Your Medications        These medications were sent to [facility] [id number] - [place], [place] - [address] [phone] [place] [phone]   [address] [address]      Phone: [phone]   cyclobenzaprine 10 MG tablet  meloxicam 7.5 MG tablet  naloxone 4 MG/0.1ML nasal spray  oxyCODONE-acetaminophen 5-325 MG per tablet          same as other medications prescribed for you. Read the directions carefully, and ask your doctor or other care provider to review them with you.                   Where to Get Your Medications        These medications were sent to [facility] [id number] - [place] - [address] - [phone]  - [phone]  [address] [place] [address]      Phone: [phone]   cyclobenzaprine 10 MG tablet  meloxicam 7.5 MG tablet  naloxone 4 MG/0.1ML nasal spray  oxyCODONE-acetaminophen 5-325 MG per tablet            Dari Peralta MD  04/02/25 4118

## (undated) DEVICE — KT PUMP INFUBLOCK MDL 2100 PMKITSOLIS

## (undated) DEVICE — DRP IOBAN ANTIMICRO 23X33IN

## (undated) DEVICE — UNDERCAST PADDING: Brand: DEROYAL

## (undated) DEVICE — PAD ARMBRD SURG CONVOL 7.5X20X2IN

## (undated) DEVICE — GLV SURG SIGNATURE TOUCH PF LTX 8 STRL BX/50

## (undated) DEVICE — TBG PENCL TELESCP MEGADYNE SMOKE EVAC 10FT

## (undated) DEVICE — HANDPIECE SET WITH HIGH FLOW TIP AND SUCTION TUBE: Brand: INTERPULSE

## (undated) DEVICE — DRP SURG 3/4 53X77IN STRL

## (undated) DEVICE — BLANKT WARM UPPR/BDY ARM/OUT 57X196CM

## (undated) DEVICE — ANTIBACTERIAL UNDYED BRAIDED (POLYGLACTIN 910), SYNTHETIC ABSORBABLE SUTURE: Brand: COATED VICRYL

## (undated) DEVICE — SYR CATH/TIP 50ML 2OZ STRL 1P/U

## (undated) DEVICE — SYS CLS SKIN PREMIERPRO EXOFINFUSION 22CM

## (undated) DEVICE — 3 BONE CEMENT MIXER: Brand: MIXEVAC

## (undated) DEVICE — SUT ETHLN 2/0 PS 18IN 585H

## (undated) DEVICE — BNDG ELAS CO-FLEX SLF ADHR 4IN5YD LF STRL

## (undated) DEVICE — GLV SURG PREMIERPRO MIC LTX PF SZ8 BRN

## (undated) DEVICE — NDL SPINE QUINCKE 18G 3.5IN PNK

## (undated) DEVICE — DRAPE,SHOULDER,BEACH CHAIR,STERILE: Brand: MEDLINE

## (undated) DEVICE — BNDG ELAS W/CLIP 6IN 10YD LF STRL

## (undated) DEVICE — PK MAJ SHLDR SPLT 10

## (undated) DEVICE — PK EXTREM UPPR 10

## (undated) DEVICE — TRY EPID SFTY 18G 3.5IN 1T7680

## (undated) DEVICE — SYR LUERLOK 30CC

## (undated) DEVICE — Device

## (undated) DEVICE — CVR FTSWITCH UNIV

## (undated) DEVICE — SWABSTK SKINPREP PVP 2/8IN/SPNG STRL

## (undated) DEVICE — DRSNG WND GZ CURAD OIL EMULSION 3X8IN STRL PK/3

## (undated) DEVICE — SUT MONOCRYL PLS ANTIB UND 3/0  PS1 27IN

## (undated) DEVICE — CONTAINER,SPECIMEN,OR STERILE,4OZ: Brand: MEDLINE

## (undated) DEVICE — KT CMT MIX MIXEVAC3

## (undated) DEVICE — DRSNG PAD ABD 8X10IN STRL

## (undated) DEVICE — STERILE PVP: Brand: MEDLINE INDUSTRIES, INC.

## (undated) DEVICE — BLD SAG SYSTEM6 25X1.27X90MM

## (undated) DEVICE — POSTN HD UNIV

## (undated) DEVICE — ADHS SKIN PREMIERPRO EXOFIN TOPICAL HI/VISC .5ML

## (undated) DEVICE — PATIENT RETURN ELECTRODE, SINGLE-USE, CONTACT QUALITY MONITORING, ADULT, WITH 9FT CORD, FOR PATIENTS WEIGING OVER 33LBS. (15KG): Brand: MEGADYNE

## (undated) DEVICE — GOWN,NON-REINFORCED,SIRUS,SET IN SLV,XL: Brand: MEDLINE

## (undated) DEVICE — BLANKT WARM LOWR/BDY 100X120CM

## (undated) DEVICE — PAD CAST SOF ROL STRL 6IN LF

## (undated) DEVICE — DISH PETRI 3.5IN MD STRL LF

## (undated) DEVICE — GLV SURG SENSICARE PI ORTHO SZ7.5 LF STRL

## (undated) DEVICE — ELECTRD BLD EZ CLN STD 6.5IN

## (undated) DEVICE — SUT VIC 2/0  CT1 CR8 18IN VCP839D

## (undated) DEVICE — PAD GRND E/S MEGADYNE MONOPLR 2/PLT W/CORD A/ DISP

## (undated) DEVICE — STRYKER PERFORMANCE SERIES SAGITTAL BLADE: Brand: STRYKER PERFORMANCE SERIES

## (undated) DEVICE — BLD SCLPL BP SS SZ15

## (undated) DEVICE — PK KN TOTL 10

## (undated) DEVICE — TRAP FLD MINIVAC MEGADYNE 100ML

## (undated) DEVICE — SUT PDS O CT1 CR/8 18IN Z740D